# Patient Record
Sex: MALE | Race: WHITE | NOT HISPANIC OR LATINO | Employment: OTHER | URBAN - NONMETROPOLITAN AREA
[De-identification: names, ages, dates, MRNs, and addresses within clinical notes are randomized per-mention and may not be internally consistent; named-entity substitution may affect disease eponyms.]

---

## 2017-04-12 ENCOUNTER — TRANSCRIBE ORDERS (OUTPATIENT)
Dept: ADMINISTRATIVE | Facility: HOSPITAL | Age: 62
End: 2017-04-12

## 2017-04-12 ENCOUNTER — APPOINTMENT (OUTPATIENT)
Dept: LAB | Facility: HOSPITAL | Age: 62
End: 2017-04-12
Attending: INTERNAL MEDICINE

## 2017-04-12 DIAGNOSIS — C61 MALIGNANT NEOPLASM OF PROSTATE (HCC): Primary | ICD-10-CM

## 2017-04-12 LAB — PSA SERPL-MCNC: 1.75 NG/ML (ref 0–4)

## 2017-04-12 PROCEDURE — 36415 COLL VENOUS BLD VENIPUNCTURE: CPT | Performed by: INTERNAL MEDICINE

## 2017-04-12 PROCEDURE — 84153 ASSAY OF PSA TOTAL: CPT | Performed by: INTERNAL MEDICINE

## 2017-04-18 ENCOUNTER — HOSPITAL ENCOUNTER (OUTPATIENT)
Dept: PREADMISSION TESTING | Age: 62
Discharge: HOME | End: 2017-04-18
Payer: COMMERCIAL

## 2017-04-18 ENCOUNTER — HOSPITAL ENCOUNTER (OUTPATIENT)
Dept: GENERAL RADIOLOGY | Age: 62
Discharge: HOME | End: 2017-04-18
Payer: COMMERCIAL

## 2017-04-18 VITALS — WEIGHT: 210 LBS | HEIGHT: 72 IN | BODY MASS INDEX: 28.44 KG/M2

## 2017-04-18 LAB
ALBUMIN SERPL-MCNC: 4.2 G/DL (ref 3.5–5.2)
ALP BLD-CCNC: 18 U/L (ref 40–130)
ALT SERPL-CCNC: 22 U/L (ref 5–41)
ANION GAP SERPL CALCULATED.3IONS-SCNC: 14 MMOL/L (ref 7–19)
APTT: 24.8 SEC (ref 26–36.2)
AST SERPL-CCNC: 22 U/L (ref 5–40)
BASOPHILS ABSOLUTE: 0 K/UL (ref 0–0.2)
BASOPHILS RELATIVE PERCENT: 0.2 % (ref 0–1)
BILIRUB SERPL-MCNC: 0.3 MG/DL (ref 0.2–1.2)
BILIRUBIN URINE: NEGATIVE
BLOOD, URINE: NEGATIVE
BUN BLDV-MCNC: 33 MG/DL (ref 8–23)
CALCIUM SERPL-MCNC: 9.5 MG/DL (ref 8.8–10.2)
CHLORIDE BLD-SCNC: 103 MMOL/L (ref 98–111)
CLARITY: CLEAR
CO2: 22 MMOL/L (ref 22–29)
COLOR: YELLOW
CREAT SERPL-MCNC: 1.4 MG/DL (ref 0.5–1.2)
EOSINOPHILS ABSOLUTE: 0.1 K/UL (ref 0–0.6)
EOSINOPHILS RELATIVE PERCENT: 2 % (ref 0–5)
GFR NON-AFRICAN AMERICAN: 51
GLOBULIN: 2.2 G/DL
GLUCOSE BLD-MCNC: 93 MG/DL (ref 74–109)
GLUCOSE URINE: NEGATIVE MG/DL
HCT VFR BLD CALC: 40.9 % (ref 42–52)
HEMOGLOBIN: 13.3 G/DL (ref 14–18)
INR BLD: 0.97 (ref 0.88–1.18)
KETONES, URINE: NEGATIVE MG/DL
LEUKOCYTE ESTERASE, URINE: NEGATIVE
LYMPHOCYTES ABSOLUTE: 1 K/UL (ref 1.1–4.5)
LYMPHOCYTES RELATIVE PERCENT: 23.4 % (ref 20–40)
MCH RBC QN AUTO: 30.3 PG (ref 27–31)
MCHC RBC AUTO-ENTMCNC: 32.5 G/DL (ref 33–37)
MCV RBC AUTO: 93.2 FL (ref 80–94)
MONOCYTES ABSOLUTE: 0.4 K/UL (ref 0–0.9)
MONOCYTES RELATIVE PERCENT: 9.1 % (ref 0–10)
NEUTROPHILS ABSOLUTE: 2.9 K/UL (ref 1.5–7.5)
NEUTROPHILS RELATIVE PERCENT: 65.1 % (ref 50–65)
NITRITE, URINE: NEGATIVE
PDW BLD-RTO: 12.8 % (ref 11.5–14.5)
PH UA: 7
PLATELET # BLD: 229 K/UL (ref 130–400)
PMV BLD AUTO: 10.5 FL (ref 7.4–10.4)
POTASSIUM SERPL-SCNC: 5 MMOL/L (ref 3.5–5)
PROTEIN UA: NEGATIVE MG/DL
PROTHROMBIN TIME: 12.9 SEC (ref 12–14.6)
RBC # BLD: 4.39 M/UL (ref 4.7–6.1)
SODIUM BLD-SCNC: 139 MMOL/L (ref 136–145)
SPECIFIC GRAVITY UA: 1.02
TOTAL PROTEIN: 6.4 G/DL (ref 6.6–8.7)
UROBILINOGEN, URINE: 0.2 E.U./DL
WBC # BLD: 4.4 K/UL (ref 4.8–10.8)

## 2017-04-18 PROCEDURE — 87070 CULTURE OTHR SPECIMN AEROBIC: CPT

## 2017-04-18 PROCEDURE — 93005 ELECTROCARDIOGRAM TRACING: CPT

## 2017-04-18 PROCEDURE — 81003 URINALYSIS AUTO W/O SCOPE: CPT

## 2017-04-18 PROCEDURE — 85610 PROTHROMBIN TIME: CPT

## 2017-04-18 PROCEDURE — 80053 COMPREHEN METABOLIC PANEL: CPT

## 2017-04-18 PROCEDURE — 85025 COMPLETE CBC W/AUTO DIFF WBC: CPT

## 2017-04-18 PROCEDURE — 71020 XR CHEST STANDARD TWO VW: CPT

## 2017-04-18 PROCEDURE — 85730 THROMBOPLASTIN TIME PARTIAL: CPT

## 2017-04-18 RX ORDER — ZOLPIDEM TARTRATE 10 MG/1
10 TABLET ORAL NIGHTLY PRN
COMMUNITY
End: 2017-10-24 | Stop reason: SDUPTHER

## 2017-04-18 RX ORDER — CELECOXIB 200 MG/1
200 CAPSULE ORAL ONCE
Status: CANCELLED | OUTPATIENT
Start: 2017-05-17

## 2017-04-18 RX ORDER — CELECOXIB 200 MG/1
200 CAPSULE ORAL DAILY
Status: ON HOLD | COMMUNITY
End: 2017-05-19 | Stop reason: HOSPADM

## 2017-04-18 RX ORDER — PREGABALIN 75 MG/1
75 CAPSULE ORAL ONCE
Status: CANCELLED | OUTPATIENT
Start: 2017-05-17

## 2017-04-18 RX ORDER — ATORVASTATIN CALCIUM 20 MG/1
20 TABLET, FILM COATED ORAL EVERY EVENING
COMMUNITY
End: 2017-10-24 | Stop reason: SDUPTHER

## 2017-04-18 RX ORDER — IRBESARTAN 150 MG/1
150 TABLET ORAL NIGHTLY
COMMUNITY
End: 2017-10-24 | Stop reason: SDUPTHER

## 2017-04-18 RX ORDER — PHENOL 1.4 %
AEROSOL, SPRAY (ML) MUCOUS MEMBRANE DAILY
COMMUNITY
End: 2017-10-24

## 2017-04-18 RX ORDER — CHLORAL HYDRATE 500 MG
1000 CAPSULE ORAL DAILY
COMMUNITY
End: 2019-10-07 | Stop reason: ALTCHOICE

## 2017-04-18 RX ORDER — ACETAMINOPHEN 500 MG
1000 TABLET ORAL ONCE
Status: CANCELLED | OUTPATIENT
Start: 2017-05-17

## 2017-04-18 RX ORDER — FENOFIBRATE 160 MG/1
160 TABLET ORAL DAILY
COMMUNITY
End: 2017-10-24 | Stop reason: SDUPTHER

## 2017-04-18 RX ORDER — DEXAMETHASONE SODIUM PHOSPHATE 10 MG/ML
10 INJECTION INTRAMUSCULAR; INTRAVENOUS ONCE
Status: CANCELLED | OUTPATIENT
Start: 2017-05-17

## 2017-04-18 RX ORDER — OXYCODONE HCL 10 MG/1
10 TABLET, FILM COATED, EXTENDED RELEASE ORAL ONCE
Status: CANCELLED | OUTPATIENT
Start: 2017-05-17

## 2017-04-19 LAB
EKG P AXIS: 50 DEGREES
EKG P-R INTERVAL: 190 MS
EKG Q-T INTERVAL: 346 MS
EKG QRS DURATION: 76 MS
EKG QTC CALCULATION (BAZETT): 349 MS
EKG T AXIS: 28 DEGREES
MRSA CULTURE ONLY: NORMAL

## 2017-05-17 ENCOUNTER — HOSPITAL ENCOUNTER (INPATIENT)
Age: 62
LOS: 2 days | Discharge: HOME HEALTH | DRG: 470 | End: 2017-05-19
Attending: ORTHOPAEDIC SURGERY | Admitting: ORTHOPAEDIC SURGERY
Payer: COMMERCIAL

## 2017-05-17 ENCOUNTER — APPOINTMENT (OUTPATIENT)
Dept: GENERAL RADIOLOGY | Age: 62
DRG: 470 | End: 2017-05-17
Attending: ORTHOPAEDIC SURGERY
Payer: COMMERCIAL

## 2017-05-17 ENCOUNTER — ANESTHESIA (OUTPATIENT)
Dept: OPERATING ROOM | Age: 62
DRG: 470 | End: 2017-05-17
Payer: COMMERCIAL

## 2017-05-17 ENCOUNTER — ANESTHESIA EVENT (OUTPATIENT)
Dept: OPERATING ROOM | Age: 62
DRG: 470 | End: 2017-05-17
Payer: COMMERCIAL

## 2017-05-17 VITALS — DIASTOLIC BLOOD PRESSURE: 74 MMHG | SYSTOLIC BLOOD PRESSURE: 122 MMHG | TEMPERATURE: 97 F | OXYGEN SATURATION: 100 %

## 2017-05-17 LAB
ABO/RH: NORMAL
ANTIBODY SCREEN: NORMAL

## 2017-05-17 PROCEDURE — 2720000003 HC MISC SUTURE/STAPLES/RELOADS/ETC: Performed by: ORTHOPAEDIC SURGERY

## 2017-05-17 PROCEDURE — 3700000001 HC ADD 15 MINUTES (ANESTHESIA): Performed by: ORTHOPAEDIC SURGERY

## 2017-05-17 PROCEDURE — 73560 X-RAY EXAM OF KNEE 1 OR 2: CPT

## 2017-05-17 PROCEDURE — C1776 JOINT DEVICE (IMPLANTABLE): HCPCS | Performed by: ORTHOPAEDIC SURGERY

## 2017-05-17 PROCEDURE — 2500000003 HC RX 250 WO HCPCS: Performed by: ORTHOPAEDIC SURGERY

## 2017-05-17 PROCEDURE — 0SRD0J9 REPLACEMENT OF LEFT KNEE JOINT WITH SYNTHETIC SUBSTITUTE, CEMENTED, OPEN APPROACH: ICD-10-PCS | Performed by: ORTHOPAEDIC SURGERY

## 2017-05-17 PROCEDURE — 6360000002 HC RX W HCPCS

## 2017-05-17 PROCEDURE — 7100000000 HC PACU RECOVERY - FIRST 15 MIN: Performed by: ORTHOPAEDIC SURGERY

## 2017-05-17 PROCEDURE — 36415 COLL VENOUS BLD VENIPUNCTURE: CPT

## 2017-05-17 PROCEDURE — 2580000003 HC RX 258: Performed by: ORTHOPAEDIC SURGERY

## 2017-05-17 PROCEDURE — 2500000003 HC RX 250 WO HCPCS

## 2017-05-17 PROCEDURE — C1713 ANCHOR/SCREW BN/BN,TIS/BN: HCPCS | Performed by: ORTHOPAEDIC SURGERY

## 2017-05-17 PROCEDURE — 94664 DEMO&/EVAL PT USE INHALER: CPT

## 2017-05-17 PROCEDURE — C9290 INJ, BUPIVACAINE LIPOSOME: HCPCS | Performed by: ORTHOPAEDIC SURGERY

## 2017-05-17 PROCEDURE — 86901 BLOOD TYPING SEROLOGIC RH(D): CPT

## 2017-05-17 PROCEDURE — 3600000005 HC SURGERY LEVEL 5 BASE: Performed by: ORTHOPAEDIC SURGERY

## 2017-05-17 PROCEDURE — 6360000002 HC RX W HCPCS: Performed by: ORTHOPAEDIC SURGERY

## 2017-05-17 PROCEDURE — G8978 MOBILITY CURRENT STATUS: HCPCS

## 2017-05-17 PROCEDURE — 86850 RBC ANTIBODY SCREEN: CPT

## 2017-05-17 PROCEDURE — 2720000001 HC MISC SURG SUPPLY STERILE $51-500: Performed by: ORTHOPAEDIC SURGERY

## 2017-05-17 PROCEDURE — 64447 NJX AA&/STRD FEMORAL NRV IMG: CPT

## 2017-05-17 PROCEDURE — 7100000001 HC PACU RECOVERY - ADDTL 15 MIN: Performed by: ORTHOPAEDIC SURGERY

## 2017-05-17 PROCEDURE — 6370000000 HC RX 637 (ALT 250 FOR IP): Performed by: ORTHOPAEDIC SURGERY

## 2017-05-17 PROCEDURE — 3700000000 HC ANESTHESIA ATTENDED CARE: Performed by: ORTHOPAEDIC SURGERY

## 2017-05-17 PROCEDURE — 86900 BLOOD TYPING SEROLOGIC ABO: CPT

## 2017-05-17 PROCEDURE — 3600000015 HC SURGERY LEVEL 5 ADDTL 15MIN: Performed by: ORTHOPAEDIC SURGERY

## 2017-05-17 PROCEDURE — 1210000000 HC MED SURG R&B

## 2017-05-17 PROCEDURE — G8979 MOBILITY GOAL STATUS: HCPCS

## 2017-05-17 PROCEDURE — 97162 PT EVAL MOD COMPLEX 30 MIN: CPT

## 2017-05-17 PROCEDURE — 97116 GAIT TRAINING THERAPY: CPT

## 2017-05-17 DEVICE — COMPONENT PAT DIA35MM THK9MM KNEE POLY CEM CONVENTIONAL: Type: IMPLANTABLE DEVICE | Site: KNEE | Status: FUNCTIONAL

## 2017-05-17 DEVICE — COMPONENT ARTC SURF PS 10-12 GH 10 MM LT TIB FIX BEAR: Type: IMPLANTABLE DEVICE | Site: KNEE | Status: FUNCTIONAL

## 2017-05-17 DEVICE — COMPONENT FEM SZ 11 NAR L KNEE CO CHROM CEM POST STBL MID: Type: IMPLANTABLE DEVICE | Site: KNEE | Status: FUNCTIONAL

## 2017-05-17 DEVICE — DISCONTINUED USE 415964 CEMENT PALACOS R + G SING DOSE 40GR: Type: IMPLANTABLE DEVICE | Site: KNEE | Status: FUNCTIONAL

## 2017-05-17 DEVICE — EXTENSION STEM SZ G 5DEG L TIBL KNEE CEM NAT TIB: Type: IMPLANTABLE DEVICE | Site: KNEE | Status: FUNCTIONAL

## 2017-05-17 RX ORDER — ACETAMINOPHEN 500 MG
1000 TABLET ORAL EVERY 8 HOURS
Status: DISCONTINUED | OUTPATIENT
Start: 2017-05-17 | End: 2017-05-19 | Stop reason: HOSPADM

## 2017-05-17 RX ORDER — ATORVASTATIN CALCIUM 20 MG/1
20 TABLET, FILM COATED ORAL EVERY EVENING
Status: DISCONTINUED | OUTPATIENT
Start: 2017-05-17 | End: 2017-05-19 | Stop reason: HOSPADM

## 2017-05-17 RX ORDER — HYDRALAZINE HYDROCHLORIDE 20 MG/ML
5 INJECTION INTRAMUSCULAR; INTRAVENOUS EVERY 10 MIN PRN
Status: DISCONTINUED | OUTPATIENT
Start: 2017-05-17 | End: 2017-05-17 | Stop reason: HOSPADM

## 2017-05-17 RX ORDER — OXYCODONE HYDROCHLORIDE 5 MG/1
10 TABLET ORAL EVERY 4 HOURS PRN
Status: DISCONTINUED | OUTPATIENT
Start: 2017-05-17 | End: 2017-05-19 | Stop reason: HOSPADM

## 2017-05-17 RX ORDER — BUPIVACAINE HYDROCHLORIDE 2.5 MG/ML
INJECTION, SOLUTION INFILTRATION; PERINEURAL PRN
Status: DISCONTINUED | OUTPATIENT
Start: 2017-05-17 | End: 2017-05-17 | Stop reason: HOSPADM

## 2017-05-17 RX ORDER — BUPIVACAINE HYDROCHLORIDE 7.5 MG/ML
INJECTION, SOLUTION INTRASPINAL PRN
Status: DISCONTINUED | OUTPATIENT
Start: 2017-05-17 | End: 2017-05-17 | Stop reason: SDUPTHER

## 2017-05-17 RX ORDER — DIAZEPAM 5 MG/1
5 TABLET ORAL EVERY 6 HOURS PRN
Status: DISCONTINUED | OUTPATIENT
Start: 2017-05-17 | End: 2017-05-19 | Stop reason: HOSPADM

## 2017-05-17 RX ORDER — SODIUM CHLORIDE 0.9 % (FLUSH) 0.9 %
10 SYRINGE (ML) INJECTION EVERY 12 HOURS SCHEDULED
Status: DISCONTINUED | OUTPATIENT
Start: 2017-05-17 | End: 2017-05-19 | Stop reason: HOSPADM

## 2017-05-17 RX ORDER — TRANEXAMIC ACID 100 MG/ML
INJECTION, SOLUTION INTRAVENOUS PRN
Status: DISCONTINUED | OUTPATIENT
Start: 2017-05-17 | End: 2017-05-17 | Stop reason: SDUPTHER

## 2017-05-17 RX ORDER — FENOFIBRATE 54 MG/1
54 TABLET ORAL DAILY
Status: DISCONTINUED | OUTPATIENT
Start: 2017-05-17 | End: 2017-05-19 | Stop reason: HOSPADM

## 2017-05-17 RX ORDER — TRAMADOL HYDROCHLORIDE 50 MG/1
50 TABLET ORAL EVERY 6 HOURS
Status: DISCONTINUED | OUTPATIENT
Start: 2017-05-17 | End: 2017-05-19 | Stop reason: HOSPADM

## 2017-05-17 RX ORDER — ENALAPRILAT 2.5 MG/2ML
1.25 INJECTION INTRAVENOUS
Status: DISCONTINUED | OUTPATIENT
Start: 2017-05-17 | End: 2017-05-17 | Stop reason: HOSPADM

## 2017-05-17 RX ORDER — SODIUM CHLORIDE 0.9 % (FLUSH) 0.9 %
10 SYRINGE (ML) INJECTION PRN
Status: DISCONTINUED | OUTPATIENT
Start: 2017-05-17 | End: 2017-05-19 | Stop reason: HOSPADM

## 2017-05-17 RX ORDER — LABETALOL HYDROCHLORIDE 5 MG/ML
5 INJECTION, SOLUTION INTRAVENOUS EVERY 10 MIN PRN
Status: DISCONTINUED | OUTPATIENT
Start: 2017-05-17 | End: 2017-05-17 | Stop reason: HOSPADM

## 2017-05-17 RX ORDER — METOCLOPRAMIDE HYDROCHLORIDE 5 MG/ML
10 INJECTION INTRAMUSCULAR; INTRAVENOUS
Status: DISCONTINUED | OUTPATIENT
Start: 2017-05-17 | End: 2017-05-17 | Stop reason: HOSPADM

## 2017-05-17 RX ORDER — LIDOCAINE HYDROCHLORIDE 10 MG/ML
1 INJECTION, SOLUTION EPIDURAL; INFILTRATION; INTRACAUDAL; PERINEURAL ONCE
Status: COMPLETED | OUTPATIENT
Start: 2017-05-17 | End: 2017-05-17

## 2017-05-17 RX ORDER — OXYCODONE HYDROCHLORIDE 5 MG/1
20 TABLET ORAL EVERY 4 HOURS PRN
Status: DISCONTINUED | OUTPATIENT
Start: 2017-05-17 | End: 2017-05-19 | Stop reason: HOSPADM

## 2017-05-17 RX ORDER — LIDOCAINE HYDROCHLORIDE 10 MG/ML
INJECTION, SOLUTION EPIDURAL; INFILTRATION; INTRACAUDAL; PERINEURAL PRN
Status: DISCONTINUED | OUTPATIENT
Start: 2017-05-17 | End: 2017-05-17 | Stop reason: SDUPTHER

## 2017-05-17 RX ORDER — EPHEDRINE SULFATE 50 MG/ML
INJECTION, SOLUTION INTRAVENOUS PRN
Status: DISCONTINUED | OUTPATIENT
Start: 2017-05-17 | End: 2017-05-17 | Stop reason: SDUPTHER

## 2017-05-17 RX ORDER — PROPOFOL 10 MG/ML
INJECTION, EMULSION INTRAVENOUS CONTINUOUS PRN
Status: DISCONTINUED | OUTPATIENT
Start: 2017-05-17 | End: 2017-05-17 | Stop reason: SDUPTHER

## 2017-05-17 RX ORDER — 0.9 % SODIUM CHLORIDE 0.9 %
500 INTRAVENOUS SOLUTION INTRAVENOUS PRN
Status: DISCONTINUED | OUTPATIENT
Start: 2017-05-17 | End: 2017-05-19 | Stop reason: HOSPADM

## 2017-05-17 RX ORDER — OXYCODONE HYDROCHLORIDE 5 MG/1
5 TABLET ORAL EVERY 4 HOURS PRN
Status: DISCONTINUED | OUTPATIENT
Start: 2017-05-17 | End: 2017-05-19 | Stop reason: HOSPADM

## 2017-05-17 RX ORDER — ONDANSETRON 2 MG/ML
4 INJECTION INTRAMUSCULAR; INTRAVENOUS EVERY 6 HOURS PRN
Status: DISCONTINUED | OUTPATIENT
Start: 2017-05-17 | End: 2017-05-19 | Stop reason: HOSPADM

## 2017-05-17 RX ORDER — SODIUM CHLORIDE 9 MG/ML
INJECTION, SOLUTION INTRAVENOUS CONTINUOUS
Status: DISCONTINUED | OUTPATIENT
Start: 2017-05-17 | End: 2017-05-19 | Stop reason: HOSPADM

## 2017-05-17 RX ORDER — DEXAMETHASONE SODIUM PHOSPHATE 10 MG/ML
10 INJECTION INTRAMUSCULAR; INTRAVENOUS ONCE
Status: DISCONTINUED | OUTPATIENT
Start: 2017-05-17 | End: 2017-05-17 | Stop reason: HOSPADM

## 2017-05-17 RX ORDER — OXYCODONE HCL 10 MG/1
10 TABLET, FILM COATED, EXTENDED RELEASE ORAL ONCE
Status: COMPLETED | OUTPATIENT
Start: 2017-05-17 | End: 2017-05-17

## 2017-05-17 RX ORDER — CLINDAMYCIN PHOSPHATE 900 MG/50ML
900 INJECTION INTRAVENOUS EVERY 8 HOURS
Status: COMPLETED | OUTPATIENT
Start: 2017-05-17 | End: 2017-05-19

## 2017-05-17 RX ORDER — OXYCODONE HCL 10 MG/1
10 TABLET, FILM COATED, EXTENDED RELEASE ORAL EVERY 12 HOURS SCHEDULED
Status: DISCONTINUED | OUTPATIENT
Start: 2017-05-17 | End: 2017-05-19 | Stop reason: HOSPADM

## 2017-05-17 RX ORDER — MEPERIDINE HYDROCHLORIDE 50 MG/ML
12.5 INJECTION INTRAMUSCULAR; INTRAVENOUS; SUBCUTANEOUS EVERY 5 MIN PRN
Status: DISCONTINUED | OUTPATIENT
Start: 2017-05-17 | End: 2017-05-17 | Stop reason: HOSPADM

## 2017-05-17 RX ORDER — FENTANYL CITRATE 50 UG/ML
INJECTION, SOLUTION INTRAMUSCULAR; INTRAVENOUS PRN
Status: DISCONTINUED | OUTPATIENT
Start: 2017-05-17 | End: 2017-05-17 | Stop reason: SDUPTHER

## 2017-05-17 RX ORDER — MIDAZOLAM HYDROCHLORIDE 1 MG/ML
INJECTION INTRAMUSCULAR; INTRAVENOUS
Status: COMPLETED
Start: 2017-05-17 | End: 2017-05-17

## 2017-05-17 RX ORDER — MORPHINE SULFATE 4 MG/ML
2 INJECTION, SOLUTION INTRAMUSCULAR; INTRAVENOUS EVERY 5 MIN PRN
Status: DISCONTINUED | OUTPATIENT
Start: 2017-05-17 | End: 2017-05-17 | Stop reason: HOSPADM

## 2017-05-17 RX ORDER — MORPHINE SULFATE 4 MG/ML
4 INJECTION, SOLUTION INTRAMUSCULAR; INTRAVENOUS EVERY 5 MIN PRN
Status: DISCONTINUED | OUTPATIENT
Start: 2017-05-17 | End: 2017-05-17 | Stop reason: HOSPADM

## 2017-05-17 RX ORDER — SODIUM CHLORIDE, SODIUM LACTATE, POTASSIUM CHLORIDE, CALCIUM CHLORIDE 600; 310; 30; 20 MG/100ML; MG/100ML; MG/100ML; MG/100ML
INJECTION, SOLUTION INTRAVENOUS CONTINUOUS
Status: DISCONTINUED | OUTPATIENT
Start: 2017-05-17 | End: 2017-05-17

## 2017-05-17 RX ORDER — PROMETHAZINE HYDROCHLORIDE 25 MG/ML
6.25 INJECTION, SOLUTION INTRAMUSCULAR; INTRAVENOUS
Status: DISCONTINUED | OUTPATIENT
Start: 2017-05-17 | End: 2017-05-17 | Stop reason: HOSPADM

## 2017-05-17 RX ORDER — PREGABALIN 75 MG/1
75 CAPSULE ORAL ONCE
Status: COMPLETED | OUTPATIENT
Start: 2017-05-17 | End: 2017-05-17

## 2017-05-17 RX ORDER — ZOLPIDEM TARTRATE 5 MG/1
10 TABLET ORAL NIGHTLY PRN
Status: DISCONTINUED | OUTPATIENT
Start: 2017-05-17 | End: 2017-05-19 | Stop reason: HOSPADM

## 2017-05-17 RX ORDER — ACETAMINOPHEN 500 MG
1000 TABLET ORAL ONCE
Status: COMPLETED | OUTPATIENT
Start: 2017-05-17 | End: 2017-05-17

## 2017-05-17 RX ORDER — TAMSULOSIN HYDROCHLORIDE 0.4 MG/1
0.4 CAPSULE ORAL DAILY
Status: DISCONTINUED | OUTPATIENT
Start: 2017-05-17 | End: 2017-05-19 | Stop reason: HOSPADM

## 2017-05-17 RX ORDER — DIPHENHYDRAMINE HYDROCHLORIDE 50 MG/ML
12.5 INJECTION INTRAMUSCULAR; INTRAVENOUS
Status: DISCONTINUED | OUTPATIENT
Start: 2017-05-17 | End: 2017-05-17 | Stop reason: HOSPADM

## 2017-05-17 RX ORDER — DOCUSATE SODIUM 100 MG/1
100 CAPSULE, LIQUID FILLED ORAL 2 TIMES DAILY
Status: DISCONTINUED | OUTPATIENT
Start: 2017-05-17 | End: 2017-05-19 | Stop reason: HOSPADM

## 2017-05-17 RX ADMIN — LIDOCAINE HYDROCHLORIDE 5 ML: 10 INJECTION, SOLUTION EPIDURAL; INFILTRATION; INTRACAUDAL; PERINEURAL at 08:30

## 2017-05-17 RX ADMIN — FENTANYL CITRATE 50 MCG: 50 INJECTION INTRAMUSCULAR; INTRAVENOUS at 08:40

## 2017-05-17 RX ADMIN — CLINDAMYCIN PHOSPHATE 900 MG: 18 INJECTION, SOLUTION INTRAVENOUS at 20:30

## 2017-05-17 RX ADMIN — TAMSULOSIN HYDROCHLORIDE 0.4 MG: 0.4 CAPSULE ORAL at 12:07

## 2017-05-17 RX ADMIN — TRANEXAMIC ACID 1000 MG: 100 INJECTION, SOLUTION INTRAVENOUS at 08:34

## 2017-05-17 RX ADMIN — RIVAROXABAN 10 MG: 10 TABLET, FILM COATED ORAL at 17:15

## 2017-05-17 RX ADMIN — OXYCODONE HYDROCHLORIDE 10 MG: 10 TABLET, FILM COATED, EXTENDED RELEASE ORAL at 20:30

## 2017-05-17 RX ADMIN — OXYCODONE HYDROCHLORIDE 10 MG: 5 TABLET ORAL at 22:08

## 2017-05-17 RX ADMIN — ACETAMINOPHEN 1000 MG: 500 TABLET, COATED ORAL at 15:26

## 2017-05-17 RX ADMIN — FENTANYL CITRATE 50 MCG: 50 INJECTION INTRAMUSCULAR; INTRAVENOUS at 09:40

## 2017-05-17 RX ADMIN — PREGABALIN 75 MG: 75 CAPSULE ORAL at 06:52

## 2017-05-17 RX ADMIN — FENTANYL CITRATE 50 MCG: 50 INJECTION INTRAMUSCULAR; INTRAVENOUS at 09:15

## 2017-05-17 RX ADMIN — OXYCODONE HYDROCHLORIDE 10 MG: 5 TABLET ORAL at 12:06

## 2017-05-17 RX ADMIN — PROPOFOL 100 MCG/KG/MIN: 10 INJECTION, EMULSION INTRAVENOUS at 08:30

## 2017-05-17 RX ADMIN — Medication 2 G: at 15:26

## 2017-05-17 RX ADMIN — LIDOCAINE HYDROCHLORIDE 1 ML: 10 INJECTION, SOLUTION EPIDURAL; INFILTRATION; INTRACAUDAL; PERINEURAL at 07:03

## 2017-05-17 RX ADMIN — SODIUM CHLORIDE, SODIUM LACTATE, POTASSIUM CHLORIDE, AND CALCIUM CHLORIDE: 600; 310; 30; 20 INJECTION, SOLUTION INTRAVENOUS at 07:03

## 2017-05-17 RX ADMIN — TRAMADOL HYDROCHLORIDE 50 MG: 50 TABLET, FILM COATED ORAL at 12:07

## 2017-05-17 RX ADMIN — TRANEXAMIC ACID 1000 MG: 100 INJECTION, SOLUTION INTRAVENOUS at 10:12

## 2017-05-17 RX ADMIN — BUPIVACAINE HYDROCHLORIDE IN DEXTROSE 2 ML: 7.5 INJECTION, SOLUTION SUBARACHNOID at 08:27

## 2017-05-17 RX ADMIN — ATORVASTATIN CALCIUM 20 MG: 20 TABLET, FILM COATED ORAL at 17:14

## 2017-05-17 RX ADMIN — DOCUSATE SODIUM 100 MG: 100 CAPSULE, LIQUID FILLED ORAL at 12:07

## 2017-05-17 RX ADMIN — OXYCODONE HYDROCHLORIDE 10 MG: 5 TABLET ORAL at 17:15

## 2017-05-17 RX ADMIN — TRAMADOL HYDROCHLORIDE 50 MG: 50 TABLET, FILM COATED ORAL at 17:15

## 2017-05-17 RX ADMIN — CLINDAMYCIN PHOSPHATE 900 MG: 18 INJECTION, SOLUTION INTRAVENOUS at 13:45

## 2017-05-17 RX ADMIN — Medication 2 G: at 08:33

## 2017-05-17 RX ADMIN — FENTANYL CITRATE 50 MCG: 50 INJECTION INTRAMUSCULAR; INTRAVENOUS at 08:30

## 2017-05-17 RX ADMIN — SODIUM CHLORIDE: 9 INJECTION, SOLUTION INTRAVENOUS at 13:45

## 2017-05-17 RX ADMIN — ACETAMINOPHEN 1000 MG: 500 TABLET ORAL at 06:52

## 2017-05-17 RX ADMIN — EPHEDRINE SULFATE 10 MG: 50 INJECTION, SOLUTION INTRAMUSCULAR; INTRAVENOUS; SUBCUTANEOUS at 08:53

## 2017-05-17 RX ADMIN — MIDAZOLAM 2 MG: 1 INJECTION INTRAMUSCULAR; INTRAVENOUS at 07:10

## 2017-05-17 RX ADMIN — FENTANYL CITRATE 50 MCG: 50 INJECTION INTRAMUSCULAR; INTRAVENOUS at 10:13

## 2017-05-17 RX ADMIN — OXYCODONE HYDROCHLORIDE 10 MG: 10 TABLET, FILM COATED, EXTENDED RELEASE ORAL at 06:52

## 2017-05-17 RX ADMIN — DOCUSATE SODIUM 100 MG: 100 CAPSULE, LIQUID FILLED ORAL at 20:31

## 2017-05-17 RX ADMIN — LIDOCAINE HYDROCHLORIDE 3 ML: 10 INJECTION, SOLUTION EPIDURAL; INFILTRATION; INTRACAUDAL; PERINEURAL at 08:27

## 2017-05-17 ASSESSMENT — PAIN - FUNCTIONAL ASSESSMENT: PAIN_FUNCTIONAL_ASSESSMENT: 0-10

## 2017-05-17 ASSESSMENT — PAIN SCALES - GENERAL
PAINLEVEL_OUTOF10: 4
PAINLEVEL_OUTOF10: 3
PAINLEVEL_OUTOF10: 2
PAINLEVEL_OUTOF10: 5
PAINLEVEL_OUTOF10: 4
PAINLEVEL_OUTOF10: 0
PAINLEVEL_OUTOF10: 7
PAINLEVEL_OUTOF10: 3
PAINLEVEL_OUTOF10: 5
PAINLEVEL_OUTOF10: 2
PAINLEVEL_OUTOF10: 3

## 2017-05-17 ASSESSMENT — PAIN DESCRIPTION - LOCATION: LOCATION: KNEE

## 2017-05-17 ASSESSMENT — PAIN DESCRIPTION - ORIENTATION: ORIENTATION: LEFT

## 2017-05-17 ASSESSMENT — PAIN DESCRIPTION - PAIN TYPE: TYPE: SURGICAL PAIN

## 2017-05-18 PROBLEM — M17.12 PRIMARY OSTEOARTHRITIS OF LEFT KNEE: Chronic | Status: ACTIVE | Noted: 2017-05-18

## 2017-05-18 LAB
ANION GAP SERPL CALCULATED.3IONS-SCNC: 14 MMOL/L (ref 7–19)
BUN BLDV-MCNC: 32 MG/DL (ref 8–23)
CALCIUM SERPL-MCNC: 8.4 MG/DL (ref 8.8–10.2)
CHLORIDE BLD-SCNC: 102 MMOL/L (ref 98–111)
CO2: 21 MMOL/L (ref 22–29)
CREAT SERPL-MCNC: 1.3 MG/DL (ref 0.5–1.2)
GFR NON-AFRICAN AMERICAN: 56
GLUCOSE BLD-MCNC: 122 MG/DL (ref 74–109)
HCT VFR BLD CALC: 31.5 % (ref 42–52)
HEMOGLOBIN: 10.5 G/DL (ref 14–18)
POTASSIUM SERPL-SCNC: 4.8 MMOL/L (ref 3.5–5)
SODIUM BLD-SCNC: 137 MMOL/L (ref 136–145)

## 2017-05-18 PROCEDURE — 85014 HEMATOCRIT: CPT

## 2017-05-18 PROCEDURE — 1210000000 HC MED SURG R&B

## 2017-05-18 PROCEDURE — 2500000003 HC RX 250 WO HCPCS: Performed by: ORTHOPAEDIC SURGERY

## 2017-05-18 PROCEDURE — 6370000000 HC RX 637 (ALT 250 FOR IP): Performed by: ORTHOPAEDIC SURGERY

## 2017-05-18 PROCEDURE — 85018 HEMOGLOBIN: CPT

## 2017-05-18 PROCEDURE — 80048 BASIC METABOLIC PNL TOTAL CA: CPT

## 2017-05-18 PROCEDURE — 6360000002 HC RX W HCPCS: Performed by: ORTHOPAEDIC SURGERY

## 2017-05-18 PROCEDURE — 36415 COLL VENOUS BLD VENIPUNCTURE: CPT

## 2017-05-18 PROCEDURE — 2580000003 HC RX 258: Performed by: ORTHOPAEDIC SURGERY

## 2017-05-18 RX ADMIN — OXYCODONE HYDROCHLORIDE 10 MG: 5 TABLET ORAL at 07:09

## 2017-05-18 RX ADMIN — TAMSULOSIN HYDROCHLORIDE 0.4 MG: 0.4 CAPSULE ORAL at 08:23

## 2017-05-18 RX ADMIN — RIVAROXABAN 10 MG: 10 TABLET, FILM COATED ORAL at 18:02

## 2017-05-18 RX ADMIN — HYDROMORPHONE HYDROCHLORIDE 1 MG: 1 INJECTION, SOLUTION INTRAMUSCULAR; INTRAVENOUS; SUBCUTANEOUS at 13:37

## 2017-05-18 RX ADMIN — TRAMADOL HYDROCHLORIDE 50 MG: 50 TABLET, FILM COATED ORAL at 05:07

## 2017-05-18 RX ADMIN — OXYCODONE HYDROCHLORIDE 10 MG: 5 TABLET ORAL at 02:48

## 2017-05-18 RX ADMIN — TRAMADOL HYDROCHLORIDE 50 MG: 50 TABLET, FILM COATED ORAL at 18:02

## 2017-05-18 RX ADMIN — ATORVASTATIN CALCIUM 20 MG: 20 TABLET, FILM COATED ORAL at 18:02

## 2017-05-18 RX ADMIN — DIAZEPAM 5 MG: 5 TABLET ORAL at 20:34

## 2017-05-18 RX ADMIN — DOCUSATE SODIUM 100 MG: 100 CAPSULE, LIQUID FILLED ORAL at 08:23

## 2017-05-18 RX ADMIN — OXYCODONE HYDROCHLORIDE 10 MG: 10 TABLET, FILM COATED, EXTENDED RELEASE ORAL at 08:23

## 2017-05-18 RX ADMIN — OXYCODONE HYDROCHLORIDE 20 MG: 5 TABLET ORAL at 20:34

## 2017-05-18 RX ADMIN — HYDROMORPHONE HYDROCHLORIDE 1 MG: 1 INJECTION, SOLUTION INTRAMUSCULAR; INTRAVENOUS; SUBCUTANEOUS at 08:39

## 2017-05-18 RX ADMIN — CLINDAMYCIN PHOSPHATE 900 MG: 18 INJECTION, SOLUTION INTRAVENOUS at 05:07

## 2017-05-18 RX ADMIN — ACETAMINOPHEN 1000 MG: 500 TABLET, COATED ORAL at 00:18

## 2017-05-18 RX ADMIN — DOCUSATE SODIUM 100 MG: 100 CAPSULE, LIQUID FILLED ORAL at 20:34

## 2017-05-18 RX ADMIN — CLINDAMYCIN PHOSPHATE 900 MG: 18 INJECTION, SOLUTION INTRAVENOUS at 20:36

## 2017-05-18 RX ADMIN — ACETAMINOPHEN 1000 MG: 500 TABLET, COATED ORAL at 07:09

## 2017-05-18 RX ADMIN — Medication 10 ML: at 08:23

## 2017-05-18 RX ADMIN — FENOFIBRATE 54 MG: 54 TABLET ORAL at 08:23

## 2017-05-18 RX ADMIN — OXYCODONE HYDROCHLORIDE 10 MG: 5 TABLET ORAL at 11:21

## 2017-05-18 RX ADMIN — Medication 2 G: at 00:18

## 2017-05-18 RX ADMIN — Medication 10 ML: at 20:37

## 2017-05-18 RX ADMIN — CLINDAMYCIN PHOSPHATE 900 MG: 18 INJECTION, SOLUTION INTRAVENOUS at 13:32

## 2017-05-18 RX ADMIN — TRAMADOL HYDROCHLORIDE 50 MG: 50 TABLET, FILM COATED ORAL at 12:06

## 2017-05-18 RX ADMIN — ACETAMINOPHEN 1000 MG: 500 TABLET, COATED ORAL at 15:28

## 2017-05-18 RX ADMIN — OXYCODONE HYDROCHLORIDE 10 MG: 10 TABLET, FILM COATED, EXTENDED RELEASE ORAL at 20:34

## 2017-05-18 ASSESSMENT — PAIN SCALES - GENERAL
PAINLEVEL_OUTOF10: 8
PAINLEVEL_OUTOF10: 8
PAINLEVEL_OUTOF10: 3
PAINLEVEL_OUTOF10: 8
PAINLEVEL_OUTOF10: 8
PAINLEVEL_OUTOF10: 2
PAINLEVEL_OUTOF10: 8
PAINLEVEL_OUTOF10: 6
PAINLEVEL_OUTOF10: 8
PAINLEVEL_OUTOF10: 0
PAINLEVEL_OUTOF10: 7
PAINLEVEL_OUTOF10: 8
PAINLEVEL_OUTOF10: 8
PAINLEVEL_OUTOF10: 7
PAINLEVEL_OUTOF10: 8

## 2017-05-19 VITALS
HEIGHT: 72 IN | BODY MASS INDEX: 28.44 KG/M2 | TEMPERATURE: 98.7 F | WEIGHT: 210 LBS | HEART RATE: 95 BPM | DIASTOLIC BLOOD PRESSURE: 79 MMHG | OXYGEN SATURATION: 92 % | RESPIRATION RATE: 17 BRPM | SYSTOLIC BLOOD PRESSURE: 120 MMHG

## 2017-05-19 LAB
ANION GAP SERPL CALCULATED.3IONS-SCNC: 14 MMOL/L (ref 7–19)
BUN BLDV-MCNC: 27 MG/DL (ref 8–23)
CALCIUM SERPL-MCNC: 8.4 MG/DL (ref 8.8–10.2)
CHLORIDE BLD-SCNC: 101 MMOL/L (ref 98–111)
CO2: 21 MMOL/L (ref 22–29)
CREAT SERPL-MCNC: 1.4 MG/DL (ref 0.5–1.2)
GFR NON-AFRICAN AMERICAN: 51
GLUCOSE BLD-MCNC: 124 MG/DL (ref 74–109)
HCT VFR BLD CALC: 30.5 % (ref 42–52)
HEMOGLOBIN: 10.1 G/DL (ref 14–18)
MCH RBC QN AUTO: 31.3 PG (ref 27–31)
MCHC RBC AUTO-ENTMCNC: 33.1 G/DL (ref 33–37)
MCV RBC AUTO: 94.4 FL (ref 80–94)
PDW BLD-RTO: 12.6 % (ref 11.5–14.5)
PLATELET # BLD: 164 K/UL (ref 130–400)
PMV BLD AUTO: 10.2 FL (ref 7.4–10.4)
POTASSIUM SERPL-SCNC: 4.1 MMOL/L (ref 3.5–5)
RBC # BLD: 3.23 M/UL (ref 4.7–6.1)
SODIUM BLD-SCNC: 136 MMOL/L (ref 136–145)
WBC # BLD: 7.7 K/UL (ref 4.8–10.8)

## 2017-05-19 PROCEDURE — 80048 BASIC METABOLIC PNL TOTAL CA: CPT

## 2017-05-19 PROCEDURE — 6360000002 HC RX W HCPCS: Performed by: ORTHOPAEDIC SURGERY

## 2017-05-19 PROCEDURE — 97530 THERAPEUTIC ACTIVITIES: CPT

## 2017-05-19 PROCEDURE — 2580000003 HC RX 258: Performed by: ORTHOPAEDIC SURGERY

## 2017-05-19 PROCEDURE — 85027 COMPLETE CBC AUTOMATED: CPT

## 2017-05-19 PROCEDURE — 6370000000 HC RX 637 (ALT 250 FOR IP): Performed by: ORTHOPAEDIC SURGERY

## 2017-05-19 PROCEDURE — 97116 GAIT TRAINING THERAPY: CPT

## 2017-05-19 PROCEDURE — 36415 COLL VENOUS BLD VENIPUNCTURE: CPT

## 2017-05-19 PROCEDURE — 2500000003 HC RX 250 WO HCPCS: Performed by: ORTHOPAEDIC SURGERY

## 2017-05-19 RX ORDER — OXYCODONE HYDROCHLORIDE 10 MG/1
10 TABLET ORAL EVERY 4 HOURS PRN
Qty: 80 TABLET | Refills: 0
Start: 2017-05-19 | End: 2017-05-26

## 2017-05-19 RX ADMIN — OXYCODONE HYDROCHLORIDE 20 MG: 5 TABLET ORAL at 09:39

## 2017-05-19 RX ADMIN — HYDROMORPHONE HYDROCHLORIDE 1 MG: 1 INJECTION, SOLUTION INTRAMUSCULAR; INTRAVENOUS; SUBCUTANEOUS at 00:00

## 2017-05-19 RX ADMIN — TAMSULOSIN HYDROCHLORIDE 0.4 MG: 0.4 CAPSULE ORAL at 09:39

## 2017-05-19 RX ADMIN — OXYCODONE HYDROCHLORIDE 20 MG: 5 TABLET ORAL at 13:33

## 2017-05-19 RX ADMIN — FENOFIBRATE 54 MG: 54 TABLET ORAL at 09:38

## 2017-05-19 RX ADMIN — ACETAMINOPHEN 1000 MG: 500 TABLET, COATED ORAL at 01:23

## 2017-05-19 RX ADMIN — HYDROMORPHONE HYDROCHLORIDE 1 MG: 1 INJECTION, SOLUTION INTRAMUSCULAR; INTRAVENOUS; SUBCUTANEOUS at 05:56

## 2017-05-19 RX ADMIN — DOCUSATE SODIUM 100 MG: 100 CAPSULE, LIQUID FILLED ORAL at 09:39

## 2017-05-19 RX ADMIN — TRAMADOL HYDROCHLORIDE 50 MG: 50 TABLET, FILM COATED ORAL at 12:03

## 2017-05-19 RX ADMIN — DIAZEPAM 5 MG: 5 TABLET ORAL at 05:53

## 2017-05-19 RX ADMIN — CLINDAMYCIN PHOSPHATE 900 MG: 18 INJECTION, SOLUTION INTRAVENOUS at 05:46

## 2017-05-19 RX ADMIN — SODIUM CHLORIDE: 9 INJECTION, SOLUTION INTRAVENOUS at 05:49

## 2017-05-19 RX ADMIN — OXYCODONE HYDROCHLORIDE 20 MG: 5 TABLET ORAL at 03:11

## 2017-05-19 RX ADMIN — OXYCODONE HYDROCHLORIDE 10 MG: 10 TABLET, FILM COATED, EXTENDED RELEASE ORAL at 09:38

## 2017-05-19 ASSESSMENT — PAIN SCALES - GENERAL
PAINLEVEL_OUTOF10: 0
PAINLEVEL_OUTOF10: 7
PAINLEVEL_OUTOF10: 0
PAINLEVEL_OUTOF10: 0
PAINLEVEL_OUTOF10: 10
PAINLEVEL_OUTOF10: 8
PAINLEVEL_OUTOF10: 8
PAINLEVEL_OUTOF10: 0
PAINLEVEL_OUTOF10: 8
PAINLEVEL_OUTOF10: 8

## 2017-08-04 LAB
ALBUMIN SERPL-MCNC: 4.2 G/DL (ref 3.5–5.2)
ALP BLD-CCNC: 23 U/L (ref 40–130)
ALT SERPL-CCNC: 17 U/L (ref 5–41)
ANION GAP SERPL CALCULATED.3IONS-SCNC: 21 MMOL/L (ref 7–19)
AST SERPL-CCNC: 20 U/L (ref 5–40)
BASOPHILS ABSOLUTE: 0 K/UL (ref 0–0.2)
BASOPHILS RELATIVE PERCENT: 0.6 % (ref 0–1)
BILIRUB SERPL-MCNC: 0.3 MG/DL (ref 0.2–1.2)
BUN BLDV-MCNC: 25 MG/DL (ref 8–23)
CALCIUM SERPL-MCNC: 9.6 MG/DL (ref 8.8–10.2)
CHLORIDE BLD-SCNC: 105 MMOL/L (ref 98–111)
CHOLESTEROL, TOTAL: 166 MG/DL (ref 160–199)
CO2: 18 MMOL/L (ref 22–29)
CREAT SERPL-MCNC: 1.3 MG/DL (ref 0.5–1.2)
EOSINOPHILS ABSOLUTE: 0.1 K/UL (ref 0–0.6)
EOSINOPHILS RELATIVE PERCENT: 2 % (ref 0–5)
GFR NON-AFRICAN AMERICAN: 56
GLUCOSE BLD-MCNC: 91 MG/DL (ref 74–109)
HCT VFR BLD CALC: 38.5 % (ref 42–52)
HDLC SERPL-MCNC: 29 MG/DL (ref 55–121)
HEMOGLOBIN: 12.5 G/DL (ref 14–18)
LDL CHOLESTEROL CALCULATED: 102 MG/DL
LYMPHOCYTES ABSOLUTE: 1.4 K/UL (ref 1.1–4.5)
LYMPHOCYTES RELATIVE PERCENT: 27.1 % (ref 20–40)
MCH RBC QN AUTO: 29.6 PG (ref 27–31)
MCHC RBC AUTO-ENTMCNC: 32.5 G/DL (ref 33–37)
MCV RBC AUTO: 91 FL (ref 80–94)
MONOCYTES ABSOLUTE: 0.4 K/UL (ref 0–0.9)
MONOCYTES RELATIVE PERCENT: 7.8 % (ref 0–10)
NEUTROPHILS ABSOLUTE: 3.1 K/UL (ref 1.5–7.5)
NEUTROPHILS RELATIVE PERCENT: 62.3 % (ref 50–65)
PDW BLD-RTO: 13.1 % (ref 11.5–14.5)
PLATELET # BLD: 247 K/UL (ref 130–400)
PMV BLD AUTO: 10.3 FL (ref 9.4–12.4)
POTASSIUM SERPL-SCNC: 4.4 MMOL/L (ref 3.5–5)
RBC # BLD: 4.23 M/UL (ref 4.7–6.1)
SODIUM BLD-SCNC: 144 MMOL/L (ref 136–145)
TOTAL PROTEIN: 7 G/DL (ref 6.6–8.7)
TRIGL SERPL-MCNC: 176 MG/DL (ref 150–199)
WBC # BLD: 5 K/UL (ref 4.8–10.8)

## 2017-08-10 PROBLEM — C61 PROSTATE CARCINOMA (HCC): Chronic | Status: ACTIVE | Noted: 2017-08-10

## 2017-08-10 PROBLEM — E78.5 HYPERLIPIDEMIA: Chronic | Status: ACTIVE | Noted: 2017-08-10

## 2017-08-10 PROBLEM — E21.0 HYPERPARATHYROIDISM, PRIMARY (HCC): Chronic | Status: ACTIVE | Noted: 2017-08-10

## 2017-08-10 PROBLEM — R73.9 HYPERGLYCEMIA: Chronic | Status: ACTIVE | Noted: 2017-08-10

## 2017-08-10 PROBLEM — N18.30 CKD (CHRONIC KIDNEY DISEASE) STAGE 3, GFR 30-59 ML/MIN (HCC): Chronic | Status: ACTIVE | Noted: 2017-08-10

## 2017-08-11 ENCOUNTER — TELEPHONE (OUTPATIENT)
Dept: INTERNAL MEDICINE | Age: 62
End: 2017-08-11

## 2017-08-11 RX ORDER — OXYCODONE HYDROCHLORIDE 10 MG/1
TABLET ORAL
Refills: 0 | COMMUNITY
Start: 2017-06-02 | End: 2017-10-24

## 2017-09-05 RX ORDER — IRBESARTAN 150 MG/1
150 TABLET ORAL
COMMUNITY
Start: 2016-04-15 | End: 2017-10-06 | Stop reason: SDUPTHER

## 2017-09-05 RX ORDER — DOXYCYCLINE 100 MG/1
CAPSULE ORAL
Refills: 0 | COMMUNITY
Start: 2017-07-06 | End: 2017-10-24

## 2017-09-05 RX ORDER — CELECOXIB 200 MG/1
200 CAPSULE ORAL
COMMUNITY
Start: 2016-04-23 | End: 2017-10-24

## 2017-09-05 RX ORDER — FENOFIBRATE 160 MG/1
160 TABLET ORAL
COMMUNITY
Start: 2016-04-15 | End: 2017-10-06 | Stop reason: SDUPTHER

## 2017-09-05 RX ORDER — MULTIVITAMIN
1 CAPSULE ORAL DAILY
COMMUNITY

## 2017-09-05 RX ORDER — ATORVASTATIN CALCIUM 20 MG/1
20 TABLET, FILM COATED ORAL
COMMUNITY
Start: 2016-04-01 | End: 2017-10-24

## 2017-09-05 RX ORDER — OXYCODONE HYDROCHLORIDE 5 MG/1
TABLET ORAL
Refills: 0 | COMMUNITY
Start: 2017-06-11 | End: 2017-10-24

## 2017-09-05 RX ORDER — ZOLPIDEM TARTRATE 10 MG/1
5 TABLET ORAL
COMMUNITY
Start: 2016-06-04 | End: 2017-10-24

## 2017-09-05 RX ORDER — ONDANSETRON 4 MG/1
4 TABLET, FILM COATED ORAL
COMMUNITY
Start: 2016-10-02 | End: 2018-10-23

## 2017-09-25 ENCOUNTER — TELEPHONE (OUTPATIENT)
Dept: INTERNAL MEDICINE | Age: 62
End: 2017-09-25

## 2017-09-25 NOTE — TELEPHONE ENCOUNTER
Returned call about needing to talk to the nurse, he didn't answer so I left him a message saying I just needed a little more information from him and to call us back

## 2017-09-26 ENCOUNTER — TELEPHONE (OUTPATIENT)
Dept: INTERNAL MEDICINE | Age: 62
End: 2017-09-26

## 2017-09-26 DIAGNOSIS — C61 PROSTATE CANCER (HCC): Primary | ICD-10-CM

## 2017-10-04 ENCOUNTER — APPOINTMENT (OUTPATIENT)
Dept: LAB | Facility: HOSPITAL | Age: 62
End: 2017-10-04
Attending: INTERNAL MEDICINE

## 2017-10-04 ENCOUNTER — TRANSCRIBE ORDERS (OUTPATIENT)
Dept: ADMINISTRATIVE | Facility: HOSPITAL | Age: 62
End: 2017-10-04

## 2017-10-04 DIAGNOSIS — C61 PROSTATE CANCER (HCC): Primary | ICD-10-CM

## 2017-10-04 LAB — PSA SERPL-MCNC: 3.45 NG/ML (ref 0–4)

## 2017-10-04 PROCEDURE — 36415 COLL VENOUS BLD VENIPUNCTURE: CPT

## 2017-10-04 PROCEDURE — 84153 ASSAY OF PSA TOTAL: CPT | Performed by: INTERNAL MEDICINE

## 2017-10-09 RX ORDER — FENOFIBRATE 160 MG/1
TABLET ORAL
Qty: 90 TABLET | Refills: 1 | Status: SHIPPED | OUTPATIENT
Start: 2017-10-09 | End: 2017-10-24

## 2017-10-09 RX ORDER — IRBESARTAN 150 MG/1
TABLET ORAL
Qty: 90 TABLET | Refills: 1 | Status: SHIPPED | OUTPATIENT
Start: 2017-10-09 | End: 2017-10-24

## 2017-10-23 PROBLEM — I10 HYPERTENSION: Status: ACTIVE | Noted: 2017-10-23

## 2017-10-24 ENCOUNTER — OFFICE VISIT (OUTPATIENT)
Dept: INTERNAL MEDICINE | Age: 62
End: 2017-10-24
Payer: COMMERCIAL

## 2017-10-24 VITALS
HEART RATE: 69 BPM | DIASTOLIC BLOOD PRESSURE: 64 MMHG | WEIGHT: 206 LBS | SYSTOLIC BLOOD PRESSURE: 102 MMHG | OXYGEN SATURATION: 95 % | HEIGHT: 72 IN | BODY MASS INDEX: 27.9 KG/M2

## 2017-10-24 DIAGNOSIS — I10 ESSENTIAL HYPERTENSION: ICD-10-CM

## 2017-10-24 DIAGNOSIS — C61 PROSTATE CARCINOMA (HCC): Primary | Chronic | ICD-10-CM

## 2017-10-24 DIAGNOSIS — N18.30 CKD (CHRONIC KIDNEY DISEASE) STAGE 3, GFR 30-59 ML/MIN (HCC): Chronic | ICD-10-CM

## 2017-10-24 DIAGNOSIS — E78.01 FAMILIAL HYPERCHOLESTEROLEMIA: Chronic | ICD-10-CM

## 2017-10-24 PROBLEM — M17.12 PRIMARY OSTEOARTHRITIS OF LEFT KNEE: Chronic | Status: RESOLVED | Noted: 2017-05-18 | Resolved: 2017-10-24

## 2017-10-24 PROCEDURE — 99396 PREV VISIT EST AGE 40-64: CPT | Performed by: INTERNAL MEDICINE

## 2017-10-24 RX ORDER — IRBESARTAN 150 MG/1
150 TABLET ORAL NIGHTLY
Qty: 90 TABLET | Refills: 2 | Status: SHIPPED | OUTPATIENT
Start: 2017-10-24 | End: 2018-09-11 | Stop reason: SDUPTHER

## 2017-10-24 RX ORDER — FENOFIBRATE 160 MG/1
160 TABLET ORAL DAILY
Qty: 90 TABLET | Refills: 2 | Status: SHIPPED | OUTPATIENT
Start: 2017-10-24 | End: 2018-09-11 | Stop reason: SDUPTHER

## 2017-10-24 RX ORDER — ATORVASTATIN CALCIUM 20 MG/1
20 TABLET, FILM COATED ORAL EVERY EVENING
Qty: 90 TABLET | Refills: 2 | Status: SHIPPED | OUTPATIENT
Start: 2017-10-24 | End: 2018-08-26 | Stop reason: SDUPTHER

## 2017-10-24 RX ORDER — ZOLPIDEM TARTRATE 10 MG/1
10 TABLET ORAL NIGHTLY PRN
Qty: 30 TABLET | Refills: 5 | Status: SHIPPED | OUTPATIENT
Start: 2017-10-24 | End: 2018-04-24 | Stop reason: SDUPTHER

## 2017-10-24 RX ORDER — MULTIVIT WITH MINERALS/LUTEIN
1000 TABLET ORAL DAILY
COMMUNITY

## 2017-10-24 ASSESSMENT — PATIENT HEALTH QUESTIONNAIRE - PHQ9
1. LITTLE INTEREST OR PLEASURE IN DOING THINGS: 0
2. FEELING DOWN, DEPRESSED OR HOPELESS: 0
SUM OF ALL RESPONSES TO PHQ QUESTIONS 1-9: 0
SUM OF ALL RESPONSES TO PHQ9 QUESTIONS 1 & 2: 0

## 2017-10-24 ASSESSMENT — ENCOUNTER SYMPTOMS
COUGH: 0
SORE THROAT: 0
ABDOMINAL PAIN: 0
SINUS PRESSURE: 0
DIARRHEA: 0
CONSTIPATION: 0
SHORTNESS OF BREATH: 0
NAUSEA: 0
TROUBLE SWALLOWING: 0
RHINORRHEA: 0

## 2017-10-24 NOTE — PROGRESS NOTES
 Marital status:      Spouse name: N/A    Number of children: N/A    Years of education: N/A     Occupational History    Not on file. Social History Main Topics    Smoking status: Former Smoker     Types: Cigarettes     Quit date: 4/18/1992    Smokeless tobacco: Never Used    Alcohol use 1.2 oz/week     2 Glasses of wine per week      Comment: DAILY    Drug use: No    Sexual activity: Not on file     Other Topics Concern    Not on file     Social History Narrative    No narrative on file      No Known Allergies   Current Outpatient Prescriptions   Medication Sig Dispense Refill    Ascorbic Acid (VITAMIN C) 1000 MG tablet Take 1,000 mg by mouth daily      zolpidem (AMBIEN) 10 MG tablet Take 1 tablet by mouth nightly as needed for Sleep 30 tablet 5    atorvastatin (LIPITOR) 20 MG tablet Take 1 tablet by mouth every evening 90 tablet 2    irbesartan (AVAPRO) 150 MG tablet Take 1 tablet by mouth nightly 90 tablet 2    fenofibrate 160 MG tablet Take 1 tablet by mouth daily 90 tablet 2    Multiple Vitamin (MULTIVITAMIN) capsule Take 1 capsule by mouth      ondansetron (ZOFRAN) 4 MG tablet Take 4 mg by mouth      Omega-3 Fatty Acids (FISH OIL) 1000 MG CAPS Take 1,000 mg by mouth daily Indications: SUPPLEMENT       No current facility-administered medications for this visit. Review of Systems   Constitutional: Negative for fatigue, fever and unexpected weight change. HENT: Negative for ear pain, postnasal drip, rhinorrhea, sinus pressure, sore throat and trouble swallowing. Eyes: Negative for visual disturbance. Respiratory: Negative for cough and shortness of breath. Cardiovascular: Negative for chest pain, palpitations and leg swelling. Gastrointestinal: Negative for abdominal pain (see hpt), constipation, diarrhea and nausea. Genitourinary: Negative for dysuria, frequency and hematuria. Musculoskeletal: Negative for arthralgias and myalgias. Skin: Negative for rash. Neurological: Negative for dizziness, syncope, weakness, light-headedness and headaches. Hematological: Negative for adenopathy. Does not bruise/bleed easily. /64   Pulse 69   Ht 6' (1.829 m)   Wt 206 lb (93.4 kg)   SpO2 95%   BMI 27.94 kg/m²    Physical Exam   Constitutional: He is oriented to person, place, and time. He appears well-developed and well-nourished. No distress. HENT:   Head: Normocephalic and atraumatic. Right Ear: External ear normal.   Left Ear: External ear normal.   Nose: Nose normal.   Mouth/Throat: Oropharynx is clear and moist.   Tympanic membranes normal   Eyes: Conjunctivae and EOM are normal. Pupils are equal, round, and reactive to light. Right eye exhibits no discharge. Left eye exhibits no discharge. No scleral icterus. Fundiscopic exam normal   Neck: No JVD present. No thyromegaly present. Cardiovascular: Normal rate, regular rhythm, normal heart sounds and intact distal pulses. Exam reveals no gallop. No murmur heard. Pulses:       Dorsalis pedis pulses are 2+ on the right side, and 2+ on the left side. Posterior tibial pulses are 2+ on the right side, and 2+ on the left side. No Carotid or abdominal bruits   Pulmonary/Chest: Effort normal and breath sounds normal. No respiratory distress. He exhibits no tenderness. Abdominal: Soft. Bowel sounds are normal. He exhibits no distension and no mass. There is no tenderness. Musculoskeletal: Normal range of motion. He exhibits no edema or tenderness. Lymphadenopathy:     He has no cervical adenopathy. Neurological: He is alert and oriented to person, place, and time. No cranial nerve deficit. Coordination normal.   No focal weakness   Skin: Skin is dry. No rash noted. No erythema. Psychiatric: He has a normal mood and affect. His behavior is normal. Judgment and thought content normal.   Vitals reviewed.           Lab Results   Component Value Date    CHOL 166 08/04/2017     Lab Results Component Value Date    TRIG 176 08/04/2017     Lab Results   Component Value Date    HDL 29 (L) 08/04/2017     Lab Results   Component Value Date    LDLCALC 102 08/04/2017     No results found for: LABVLDL, VLDL  No results found for: [facility]   Lab Results   Component Value Date     08/04/2017    K 4.4 08/04/2017     08/04/2017    CO2 18 (L) 08/04/2017    BUN 25 (H) 08/04/2017    CREATININE 1.3 (H) 08/04/2017    GLUCOSE 91 08/04/2017    CALCIUM 9.6 08/04/2017    PROT 7.0 08/04/2017    LABALBU 4.2 08/04/2017    BILITOT 0.3 08/04/2017    ALKPHOS 23 (L) 08/04/2017    AST 20 08/04/2017    ALT 17 08/04/2017    LABGLOM 56 (A) 08/04/2017    GLOB 2.2 04/18/2017        Lab Results   Component Value Date    WBC 5.0 08/04/2017    HGB 12.5 (L) 08/04/2017    HCT 38.5 (L) 08/04/2017    MCV 91.0 08/04/2017     08/04/2017      PSA=3.45  (10/5/17)    Patient Active Problem List   Diagnosis    CKD (chronic kidney disease) stage 3, GFR 30-59 ml/min    [facility]par[facility], [facility] [address]    Prostate carcinoma ([facility] [facility]    Hyperlipidemia    Hyperglycemia    Hypertension       DIAGNOSES:    ICD-10-CM ICD-9-CM    1. Prostate carcinoma (HCC) C61 185 PSA, Diagnostic      CBC Auto Differential   2. Essential hypertension I10 401.9 Comprehensive Metabolic Panel   3. Familial hypercholesterolemia E78.01 272.0 Comprehensive Metabolic Panel      Lipid Panel   4. CKD (chronic kidney disease) stage 3, GFR 30-59 ml/min N18.3 585.3 CBC Auto Differential        Orders Placed This Encounter   Procedures    PSA, Diagnostic    Comprehensive Metabolic Panel    CBC Auto Differential    Lipid Panel          New Prescriptions    No medications on file        ASSESSMENT/PLAN:   His PSA has gradually increased and he will be seen at [facility] again in a few months with follow-up PSA. Anti-antiandrogen therapy may be initiated at that time. His blood pressure is well controlled.  Lipids were acceptable but likes triglycerides a bit better and HDL up some. Renal function is stable. We will set up PSA for him in 4 months at [facility] which she can then take to [facility] with him. I'll see him back in 6 months with CMP lipid and CBC.

## 2017-10-30 ENCOUNTER — NURSE ONLY (OUTPATIENT)
Dept: INTERNAL MEDICINE | Age: 62
End: 2017-10-30
Payer: COMMERCIAL

## 2017-10-30 DIAGNOSIS — Z23 IMMUNIZATION DUE: Primary | ICD-10-CM

## 2017-10-30 PROCEDURE — 90686 IIV4 VACC NO PRSV 0.5 ML IM: CPT | Performed by: INTERNAL MEDICINE

## 2017-10-30 PROCEDURE — 90471 IMMUNIZATION ADMIN: CPT | Performed by: INTERNAL MEDICINE

## 2018-02-01 ENCOUNTER — TRANSCRIBE ORDERS (OUTPATIENT)
Dept: ADMINISTRATIVE | Facility: HOSPITAL | Age: 63
End: 2018-02-01

## 2018-02-01 ENCOUNTER — APPOINTMENT (OUTPATIENT)
Dept: LAB | Facility: HOSPITAL | Age: 63
End: 2018-02-01
Attending: INTERNAL MEDICINE

## 2018-02-01 DIAGNOSIS — C61 PROSTATE CARCINOMA (HCC): Chronic | ICD-10-CM

## 2018-02-01 DIAGNOSIS — C61 PROSTATE CARCINOMA (HCC): Primary | ICD-10-CM

## 2018-02-01 LAB — PSA SERPL-MCNC: 2.45 NG/ML (ref 0–4)

## 2018-02-01 PROCEDURE — 36415 COLL VENOUS BLD VENIPUNCTURE: CPT

## 2018-02-01 PROCEDURE — 84153 ASSAY OF PSA TOTAL: CPT | Performed by: INTERNAL MEDICINE

## 2018-02-18 ENCOUNTER — OFFICE VISIT (OUTPATIENT)
Dept: URGENT CARE | Age: 63
End: 2018-02-18
Payer: COMMERCIAL

## 2018-02-18 VITALS
BODY MASS INDEX: 28.77 KG/M2 | WEIGHT: 212.4 LBS | OXYGEN SATURATION: 97 % | DIASTOLIC BLOOD PRESSURE: 78 MMHG | RESPIRATION RATE: 18 BRPM | TEMPERATURE: 98.7 F | SYSTOLIC BLOOD PRESSURE: 127 MMHG | HEIGHT: 72 IN | HEART RATE: 71 BPM

## 2018-02-18 DIAGNOSIS — R52 BODY ACHES: ICD-10-CM

## 2018-02-18 DIAGNOSIS — J02.9 SORE THROAT: ICD-10-CM

## 2018-02-18 DIAGNOSIS — J02.9 ACUTE PHARYNGITIS, UNSPECIFIED ETIOLOGY: Primary | ICD-10-CM

## 2018-02-18 LAB
INFLUENZA A ANTIBODY: NEGATIVE
INFLUENZA B ANTIBODY: NEGATIVE
S PYO AG THROAT QL: NORMAL

## 2018-02-18 PROCEDURE — 87880 STREP A ASSAY W/OPTIC: CPT | Performed by: SPECIALIST

## 2018-02-18 PROCEDURE — 87804 INFLUENZA ASSAY W/OPTIC: CPT | Performed by: SPECIALIST

## 2018-02-18 PROCEDURE — 99213 OFFICE O/P EST LOW 20 MIN: CPT | Performed by: SPECIALIST

## 2018-02-18 RX ORDER — AZITHROMYCIN 250 MG/1
TABLET, FILM COATED ORAL
Qty: 1 PACKET | Refills: 0 | Status: SHIPPED | OUTPATIENT
Start: 2018-02-18 | End: 2018-02-28

## 2018-02-18 ASSESSMENT — ENCOUNTER SYMPTOMS
SORE THROAT: 1
RHINORRHEA: 1
COUGH: 1

## 2018-02-18 NOTE — PROGRESS NOTES
[address] [facility] [address] [facility]  [address] [facility] [address] [facility] [address]  Dept: [phone]  Loc: [phone]    Em Joshua is a 57 y.o. male who presents today for his medical conditions/complaints as noted below. Em Joshua is c/o of Headache; Generalized Body Aches; Cough; Congestion; and Pharyngitis        HPI:     Headache    This is a new problem. The current episode started yesterday. The problem has been resolved. The pain is located in the frontal region. The pain does not radiate. The pain quality is similar to prior headaches. The quality of the pain is described as aching. Associated symptoms include coughing, rhinorrhea and a sore throat. Generalized Body Aches   Associated symptoms include coughing, headaches and a sore throat. Cough   Associated symptoms include headaches, rhinorrhea and a sore throat. Pharyngitis   Associated symptoms include coughing, headaches and a sore throat.        Past Medical History:   Diagnosis Date    Arthritis     OSTEO    CKD (chronic kidney disease) stage 3, GFR 30-59 ml/min 8/10/2017    Hyperglycemia 8/10/2017    Hyperlipidemia     Hypertension     Prostate carcinoma ([facility] [address] 8/10/2017      Past Surgical History:   Procedure Laterality Date    KNEE SURGERY Left     knee ligamentous reconstruction with residual degenerative arthritis    PARATHYROIDECTOMY Left 09/16/2016    parathyroid resection single tumor removal left inferior/adenoma with associated hyperparathyroidism     PROSTATECTOMY  2012    TONSILLECTOMY      TOTAL KNEE ARTHROPLASTY Left 5/17/2017    KNEE TOTAL ARTHROPLASTY performed by Miguel Angel Cuba MD at [address] [facility] History   Problem Relation Age of Onset    Cancer Mother 52     large cell nonkeratinizing squamous cell of the pancreas                          COPD Father     Cancer Father      Hodgkin's disease head, face, neck    Stroke Father      ischemic       Social History   Substance equal, round, and reactive to light. Neck: Normal range of motion. Neck supple. Cardiovascular: Normal rate, regular rhythm, normal heart sounds and intact distal pulses. Pulmonary/Chest: Effort normal and breath sounds normal.   Musculoskeletal: Normal range of motion. Neurological: He is alert and oriented to person, place, and time. Skin: Skin is warm and dry. Psychiatric: He has a normal mood and affect. His behavior is normal. Judgment and thought content normal.   Nursing note and vitals reviewed. /78   Pulse 71   Temp 98.7 °F (37.1 °C) (Oral)   Resp 18   Ht 6' (1.829 m)   Wt 212 lb 6.4 oz (96.3 kg)   SpO2 97%   BMI 28.81 kg/m²     Assessment:      1. Acute pharyngitis, unspecified etiology     2. Sore throat  POCT rapid strep A   3. Body aches  POCT Influenza A/B       Plan:      Orders Placed This Encounter   Procedures    POCT rapid strep A    POCT Influenza A/B       No Follow-up on file. Orders Placed This Encounter   Procedures    POCT rapid strep A    POCT Influenza A/B     Orders Placed This Encounter   Medications    azithromycin (ZITHROMAX Z-DONAL) 250 MG tablet     Sig: Take as directed     Dispense:  1 packet     Refill:  0       Patient given educational materials - see patient instructions. Discussed use, benefit, and side effects of prescribed medications. All patient questions answered. Pt voiced understanding. Reviewed health maintenance. Instructed to continue current medications, diet and exercise. Patient agreed with treatment plan. Follow up as directed. Patient Instructions       Patient Education        Sore Throat: Care Instructions  Your Care Instructions    Infection by bacteria or a virus causes most sore throats. Cigarette smoke, dry air, air pollution, allergies, and yelling can also cause a sore throat. Sore throats can be painful and annoying. Fortunately, most sore throats go away on their own.  If you have a bacterial infection, your

## 2018-04-17 DIAGNOSIS — N18.30 CKD (CHRONIC KIDNEY DISEASE) STAGE 3, GFR 30-59 ML/MIN (HCC): Chronic | ICD-10-CM

## 2018-04-17 DIAGNOSIS — I10 ESSENTIAL HYPERTENSION: ICD-10-CM

## 2018-04-17 DIAGNOSIS — C61 PROSTATE CARCINOMA (HCC): Chronic | ICD-10-CM

## 2018-04-17 DIAGNOSIS — E78.01 FAMILIAL HYPERCHOLESTEROLEMIA: Chronic | ICD-10-CM

## 2018-04-17 DIAGNOSIS — C61 PROSTATE CANCER (HCC): ICD-10-CM

## 2018-04-17 LAB
ALBUMIN SERPL-MCNC: 4.3 G/DL (ref 3.5–5.2)
ALP BLD-CCNC: 19 U/L (ref 40–130)
ALT SERPL-CCNC: 21 U/L (ref 5–41)
ANION GAP SERPL CALCULATED.3IONS-SCNC: 18 MMOL/L (ref 7–19)
AST SERPL-CCNC: 22 U/L (ref 5–40)
BASOPHILS ABSOLUTE: 0 K/UL (ref 0–0.2)
BASOPHILS RELATIVE PERCENT: 0.6 % (ref 0–1)
BILIRUB SERPL-MCNC: 0.3 MG/DL (ref 0.2–1.2)
BUN BLDV-MCNC: 29 MG/DL (ref 8–23)
CALCIUM SERPL-MCNC: 9.6 MG/DL (ref 8.8–10.2)
CHLORIDE BLD-SCNC: 104 MMOL/L (ref 98–111)
CHOLESTEROL, TOTAL: 176 MG/DL (ref 160–199)
CO2: 21 MMOL/L (ref 22–29)
CREAT SERPL-MCNC: 1.2 MG/DL (ref 0.5–1.2)
EOSINOPHILS ABSOLUTE: 0.1 K/UL (ref 0–0.6)
EOSINOPHILS RELATIVE PERCENT: 1.7 % (ref 0–5)
GFR NON-AFRICAN AMERICAN: >60
GLUCOSE BLD-MCNC: 104 MG/DL (ref 74–109)
HCT VFR BLD CALC: 41.9 % (ref 42–52)
HDLC SERPL-MCNC: 32 MG/DL (ref 55–121)
HEMOGLOBIN: 14 G/DL (ref 14–18)
LDL CHOLESTEROL CALCULATED: 89 MG/DL
LYMPHOCYTES ABSOLUTE: 1.5 K/UL (ref 1.1–4.5)
LYMPHOCYTES RELATIVE PERCENT: 28.2 % (ref 20–40)
MCH RBC QN AUTO: 30.6 PG (ref 27–31)
MCHC RBC AUTO-ENTMCNC: 33.4 G/DL (ref 33–37)
MCV RBC AUTO: 91.5 FL (ref 80–94)
MONOCYTES ABSOLUTE: 0.5 K/UL (ref 0–0.9)
MONOCYTES RELATIVE PERCENT: 9 % (ref 0–10)
NEUTROPHILS ABSOLUTE: 3.2 K/UL (ref 1.5–7.5)
NEUTROPHILS RELATIVE PERCENT: 59.9 % (ref 50–65)
PDW BLD-RTO: 12.3 % (ref 11.5–14.5)
PLATELET # BLD: 243 K/UL (ref 130–400)
PMV BLD AUTO: 10.2 FL (ref 9.4–12.4)
POTASSIUM SERPL-SCNC: 4.6 MMOL/L (ref 3.5–5)
PROSTATE SPECIFIC ANTIGEN: 3.39 NG/ML (ref 0–4)
RBC # BLD: 4.58 M/UL (ref 4.7–6.1)
SODIUM BLD-SCNC: 143 MMOL/L (ref 136–145)
TOTAL PROTEIN: 6.7 G/DL (ref 6.6–8.7)
TRIGL SERPL-MCNC: 277 MG/DL (ref 0–149)
WBC # BLD: 5.3 K/UL (ref 4.8–10.8)

## 2018-04-24 ENCOUNTER — OFFICE VISIT (OUTPATIENT)
Dept: INTERNAL MEDICINE | Age: 63
End: 2018-04-24
Payer: COMMERCIAL

## 2018-04-24 VITALS
DIASTOLIC BLOOD PRESSURE: 82 MMHG | HEIGHT: 72 IN | HEART RATE: 85 BPM | BODY MASS INDEX: 28.85 KG/M2 | SYSTOLIC BLOOD PRESSURE: 120 MMHG | WEIGHT: 213 LBS | RESPIRATION RATE: 97 BRPM

## 2018-04-24 DIAGNOSIS — I10 ESSENTIAL HYPERTENSION: ICD-10-CM

## 2018-04-24 DIAGNOSIS — L57.0 ACTINIC KERATOSIS: ICD-10-CM

## 2018-04-24 DIAGNOSIS — C61 PROSTATE CARCINOMA (HCC): Chronic | ICD-10-CM

## 2018-04-24 DIAGNOSIS — E78.01 FAMILIAL HYPERCHOLESTEROLEMIA: Chronic | ICD-10-CM

## 2018-04-24 DIAGNOSIS — N18.30 CKD (CHRONIC KIDNEY DISEASE) STAGE 3, GFR 30-59 ML/MIN (HCC): Primary | Chronic | ICD-10-CM

## 2018-04-24 DIAGNOSIS — R73.9 HYPERGLYCEMIA: Chronic | ICD-10-CM

## 2018-04-24 PROCEDURE — 99214 OFFICE O/P EST MOD 30 MIN: CPT | Performed by: INTERNAL MEDICINE

## 2018-04-24 RX ORDER — ZOLPIDEM TARTRATE 10 MG/1
10 TABLET ORAL NIGHTLY PRN
Qty: 30 TABLET | Refills: 5 | Status: SHIPPED | OUTPATIENT
Start: 2018-04-24 | End: 2021-02-17 | Stop reason: SDUPTHER

## 2018-04-24 ASSESSMENT — ENCOUNTER SYMPTOMS
ABDOMINAL PAIN: 0
SHORTNESS OF BREATH: 0
COUGH: 0
NAUSEA: 0
DIARRHEA: 0

## 2018-08-13 ENCOUNTER — APPOINTMENT (OUTPATIENT)
Dept: LAB | Facility: HOSPITAL | Age: 63
End: 2018-08-13
Attending: INTERNAL MEDICINE

## 2018-08-13 ENCOUNTER — TRANSCRIBE ORDERS (OUTPATIENT)
Dept: ADMINISTRATIVE | Facility: HOSPITAL | Age: 63
End: 2018-08-13

## 2018-08-13 DIAGNOSIS — C61 PROSTATE CARCINOMA (HCC): Primary | ICD-10-CM

## 2018-08-13 LAB — PSA SERPL-MCNC: 2.89 NG/ML (ref 0–4)

## 2018-08-13 PROCEDURE — 36415 COLL VENOUS BLD VENIPUNCTURE: CPT

## 2018-08-13 PROCEDURE — 84153 ASSAY OF PSA TOTAL: CPT | Performed by: INTERNAL MEDICINE

## 2018-08-27 RX ORDER — ATORVASTATIN CALCIUM 20 MG/1
TABLET, FILM COATED ORAL
Qty: 90 TABLET | Refills: 2 | Status: SHIPPED | OUTPATIENT
Start: 2018-08-27 | End: 2019-05-01 | Stop reason: SDUPTHER

## 2018-08-27 NOTE — TELEPHONE ENCOUNTER
Justice Dior called requesting a refill of the below medication which has been pended for you:     Requested Prescriptions     Pending Prescriptions Disp Refills    atorvastatin (LIPITOR) 20 MG tablet [Pharmacy Med Name: ATORVASTATIN TABS 20MG] 90 tablet 2     Sig: TAKE 1 TABLET EVERY EVENING       Last Appointment Date: 4/24/2018  Next Appointment Date: 10/23/2018    No Known Allergies

## 2018-09-11 RX ORDER — IRBESARTAN 150 MG/1
TABLET ORAL
Qty: 90 TABLET | Refills: 3 | Status: SHIPPED | OUTPATIENT
Start: 2018-09-11 | End: 2019-05-01 | Stop reason: SDUPTHER

## 2018-09-11 RX ORDER — FENOFIBRATE 160 MG/1
TABLET ORAL
Qty: 90 TABLET | Refills: 3 | Status: SHIPPED | OUTPATIENT
Start: 2018-09-11 | End: 2019-05-01 | Stop reason: SDUPTHER

## 2018-10-16 DIAGNOSIS — N18.30 CKD (CHRONIC KIDNEY DISEASE) STAGE 3, GFR 30-59 ML/MIN (HCC): Chronic | ICD-10-CM

## 2018-10-16 DIAGNOSIS — I10 ESSENTIAL HYPERTENSION: ICD-10-CM

## 2018-10-16 DIAGNOSIS — E78.01 FAMILIAL HYPERCHOLESTEROLEMIA: Chronic | ICD-10-CM

## 2018-10-16 LAB
ALBUMIN SERPL-MCNC: 4.3 G/DL (ref 3.5–5.2)
ALP BLD-CCNC: 19 U/L (ref 40–130)
ALT SERPL-CCNC: 22 U/L (ref 5–41)
ANION GAP SERPL CALCULATED.3IONS-SCNC: 13 MMOL/L (ref 7–19)
AST SERPL-CCNC: 22 U/L (ref 5–40)
BASOPHILS ABSOLUTE: 0 K/UL (ref 0–0.2)
BASOPHILS RELATIVE PERCENT: 0.5 % (ref 0–1)
BILIRUB SERPL-MCNC: 0.3 MG/DL (ref 0.2–1.2)
BUN BLDV-MCNC: 30 MG/DL (ref 8–23)
CALCIUM SERPL-MCNC: 9.2 MG/DL (ref 8.8–10.2)
CHLORIDE BLD-SCNC: 105 MMOL/L (ref 98–111)
CHOLESTEROL, TOTAL: 172 MG/DL (ref 160–199)
CO2: 22 MMOL/L (ref 22–29)
CREAT SERPL-MCNC: 1.3 MG/DL (ref 0.5–1.2)
EOSINOPHILS ABSOLUTE: 0.1 K/UL (ref 0–0.6)
EOSINOPHILS RELATIVE PERCENT: 1.2 % (ref 0–5)
GFR NON-AFRICAN AMERICAN: 55
GLUCOSE BLD-MCNC: 98 MG/DL (ref 74–109)
HCT VFR BLD CALC: 42.3 % (ref 42–52)
HDLC SERPL-MCNC: 33 MG/DL (ref 55–121)
HEMOGLOBIN: 14.1 G/DL (ref 14–18)
LDL CHOLESTEROL CALCULATED: 91 MG/DL
LYMPHOCYTES ABSOLUTE: 1.4 K/UL (ref 1.1–4.5)
LYMPHOCYTES RELATIVE PERCENT: 23.1 % (ref 20–40)
MCH RBC QN AUTO: 30.6 PG (ref 27–31)
MCHC RBC AUTO-ENTMCNC: 33.3 G/DL (ref 33–37)
MCV RBC AUTO: 91.8 FL (ref 80–94)
MONOCYTES ABSOLUTE: 0.5 K/UL (ref 0–0.9)
MONOCYTES RELATIVE PERCENT: 8.6 % (ref 0–10)
NEUTROPHILS ABSOLUTE: 4 K/UL (ref 1.5–7.5)
NEUTROPHILS RELATIVE PERCENT: 66.1 % (ref 50–65)
PDW BLD-RTO: 12.3 % (ref 11.5–14.5)
PLATELET # BLD: 223 K/UL (ref 130–400)
PMV BLD AUTO: 10 FL (ref 9.4–12.4)
POTASSIUM SERPL-SCNC: 4.7 MMOL/L (ref 3.5–5)
RBC # BLD: 4.61 M/UL (ref 4.7–6.1)
SODIUM BLD-SCNC: 140 MMOL/L (ref 136–145)
TOTAL PROTEIN: 6.8 G/DL (ref 6.6–8.7)
TRIGL SERPL-MCNC: 238 MG/DL (ref 0–149)
WBC # BLD: 6.1 K/UL (ref 4.8–10.8)

## 2018-10-23 ENCOUNTER — OFFICE VISIT (OUTPATIENT)
Dept: INTERNAL MEDICINE | Age: 63
End: 2018-10-23
Payer: COMMERCIAL

## 2018-10-23 VITALS
SYSTOLIC BLOOD PRESSURE: 138 MMHG | OXYGEN SATURATION: 95 % | BODY MASS INDEX: 28.85 KG/M2 | WEIGHT: 213 LBS | HEART RATE: 58 BPM | HEIGHT: 72 IN | DIASTOLIC BLOOD PRESSURE: 88 MMHG

## 2018-10-23 DIAGNOSIS — I10 ESSENTIAL HYPERTENSION: Primary | ICD-10-CM

## 2018-10-23 DIAGNOSIS — E78.2 MIXED HYPERLIPIDEMIA: Chronic | ICD-10-CM

## 2018-10-23 DIAGNOSIS — C61 PROSTATE CANCER (HCC): ICD-10-CM

## 2018-10-23 PROBLEM — N18.30 CKD (CHRONIC KIDNEY DISEASE) STAGE 3, GFR 30-59 ML/MIN (HCC): Chronic | Status: RESOLVED | Noted: 2017-08-10 | Resolved: 2018-10-23

## 2018-10-23 PROBLEM — E78.5 HYPERLIPIDEMIA: Chronic | Status: RESOLVED | Noted: 2017-08-10 | Resolved: 2018-10-23

## 2018-10-23 PROCEDURE — 99396 PREV VISIT EST AGE 40-64: CPT | Performed by: INTERNAL MEDICINE

## 2018-10-23 ASSESSMENT — ENCOUNTER SYMPTOMS
ABDOMINAL PAIN: 0
COUGH: 0
BLOOD IN STOOL: 0
SHORTNESS OF BREATH: 0
RHINORRHEA: 0
SORE THROAT: 0
CONSTIPATION: 0
COLOR CHANGE: 0
SINUS PRESSURE: 0
NAUSEA: 0
TROUBLE SWALLOWING: 0
BACK PAIN: 0
DIARRHEA: 0

## 2018-10-23 ASSESSMENT — PATIENT HEALTH QUESTIONNAIRE - PHQ9
SUM OF ALL RESPONSES TO PHQ9 QUESTIONS 1 & 2: 0
2. FEELING DOWN, DEPRESSED OR HOPELESS: 0
1. LITTLE INTEREST OR PLEASURE IN DOING THINGS: 0
SUM OF ALL RESPONSES TO PHQ QUESTIONS 1-9: 0
SUM OF ALL RESPONSES TO PHQ QUESTIONS 1-9: 0

## 2018-11-12 ENCOUNTER — OFFICE VISIT (OUTPATIENT)
Dept: URGENT CARE | Age: 63
End: 2018-11-12
Payer: COMMERCIAL

## 2018-11-12 VITALS
DIASTOLIC BLOOD PRESSURE: 62 MMHG | RESPIRATION RATE: 18 BRPM | SYSTOLIC BLOOD PRESSURE: 120 MMHG | BODY MASS INDEX: 28.99 KG/M2 | OXYGEN SATURATION: 95 % | WEIGHT: 214 LBS | HEART RATE: 79 BPM | HEIGHT: 72 IN | TEMPERATURE: 97.3 F

## 2018-11-12 DIAGNOSIS — S61.012A LACERATION OF LEFT THUMB WITHOUT FOREIGN BODY WITHOUT DAMAGE TO NAIL, INITIAL ENCOUNTER: Primary | ICD-10-CM

## 2018-11-12 PROCEDURE — 99213 OFFICE O/P EST LOW 20 MIN: CPT | Performed by: SPECIALIST

## 2018-11-12 PROCEDURE — 90471 IMMUNIZATION ADMIN: CPT | Performed by: SPECIALIST

## 2018-11-12 PROCEDURE — 90715 TDAP VACCINE 7 YRS/> IM: CPT | Performed by: SPECIALIST

## 2018-11-12 NOTE — PATIENT INSTRUCTIONS
doctor told you how to care for your cut, follow your doctor's instructions. If you did not get instructions, follow this general advice:  ? Do not put any kind of ointment, cream, or lotion over the area. This can make the adhesive fall off too soon. ? After the first 24 to 48 hours, wash around the cut with clean water 2 times a day. Do not use hydrogen peroxide or alcohol, which can slow healing. ? If the doctor told you to use a bandage, put on a new bandage after cleaning the cut or if the bandage gets wet or dirty. · Prop up the sore area on a pillow anytime you sit or lie down during the next 3 days. Try to keep it above the level of your heart. This will help reduce swelling. · Leave the skin adhesive on your skin until it falls off on its own. This may take 5 to 10 days. · Do not scratch, rub, or pick at the adhesive. · Do not put the sticky part of a bandage directly on the adhesive. · Avoid any activity that could cause your cut to reopen. · Be safe with medicines. Read and follow all instructions on the label. ? If the doctor gave you a prescription medicine for pain, take it as prescribed. ? If you are not taking a prescription pain medicine, ask your doctor if you can take an over-the-counter medicine. When should you call for help? Call your doctor now or seek immediate medical care if:    · You have new pain, or your pain gets worse.     · The skin near the cut is cold or pale or changes color.     · You have tingling, weakness, or numbness near the cut.     · The cut starts to bleed.     · You have trouble moving the area near the cut.     · You have symptoms of infection, such as:  ? Increased pain, swelling, warmth, or redness around the cut.  ? Red streaks leading from the cut.  ? Pus draining from the cut.  ? A fever.    Watch closely for changes in your health, and be sure to contact your doctor if:    · The cut reopens.     · You do not get better as expected.    Where can you learn

## 2018-11-12 NOTE — PROGRESS NOTES
[address] [facility] [address] [place] [id number] [facility]  [address] [place] [address]  Dept: [phone]  Loc: [phone]    Merlyn Torres is a 57 y.o. male who presents today for his medical conditions/complaintsas noted below. Merlyn Torres is c/o of Laceration        HPI:     Laceration    The incident occurred less than 1 hour ago. The laceration is located on the left hand. Size: approximately 0.5cm. The laceration mechanism was a clean knife. The pain is at a severity of 0/10. The patient is experiencing no pain. He reports no foreign bodies present. His tetanus status is unknown.        Past Medical History:   Diagnosis Date    Arthritis     OSTEO    CKD (chronic kidney disease) stage 3, GFR 30-59 ml/min ([facility]) 8/10/2017    Hyperglycemia 8/10/2017    Hyperlipidemia     Hypertension     Mixed hyperlipidemia 8/10/2017    Prostate carcinoma ([address] 8/10/2017     Past Surgical History:   Procedure Laterality Date    KNEE SURGERY Left     knee ligamentous reconstruction with residual degenerative arthritis    PARATHYROIDECTOMY Left 09/16/2016    parathyroid resection single tumor removal left inferior/adenoma with associated hyperparathyroidism     PROSTATECTOMY  2012    TONSILLECTOMY      TOTAL KNEE ARTHROPLASTY Left 5/17/2017    KNEE TOTAL ARTHROPLASTY performed by Duaine Harada, MD at [address] History   Problem Relation Age of Onset    Cancer Mother 52        large cell nonkeratinizing squamous cell of the pancreas                          COPD Father     Cancer Father         Hodgkin's disease head, face, neck    Stroke Father         ischemic       Social History   Substance Use Topics    Smoking status: Former Smoker     Packs/day: 1.00     Years: 20.00     Types: Cigarettes     Quit date: 4/18/1992    Smokeless tobacco: Never Used    Alcohol use 1.2 oz/week     2 Glasses of wine per week      Comment: DAILY      Current Outpatient Prescriptions   Medication

## 2019-02-14 ENCOUNTER — TRANSCRIBE ORDERS (OUTPATIENT)
Dept: LAB | Facility: HOSPITAL | Age: 64
End: 2019-02-14

## 2019-02-14 ENCOUNTER — APPOINTMENT (OUTPATIENT)
Dept: LAB | Facility: HOSPITAL | Age: 64
End: 2019-02-14

## 2019-02-14 DIAGNOSIS — C61 MALIGNANT NEOPLASM OF PROSTATE (HCC): Primary | ICD-10-CM

## 2019-02-14 LAB — PSA SERPL-MCNC: 2.6 NG/ML (ref 0–4)

## 2019-02-14 PROCEDURE — 36415 COLL VENOUS BLD VENIPUNCTURE: CPT

## 2019-02-14 PROCEDURE — 84153 ASSAY OF PSA TOTAL: CPT | Performed by: INTERNAL MEDICINE

## 2019-03-12 ENCOUNTER — OFFICE VISIT (OUTPATIENT)
Dept: GASTROENTEROLOGY | Facility: CLINIC | Age: 64
End: 2019-03-12

## 2019-03-12 VITALS
DIASTOLIC BLOOD PRESSURE: 74 MMHG | HEART RATE: 73 BPM | TEMPERATURE: 97.6 F | OXYGEN SATURATION: 98 % | WEIGHT: 216 LBS | HEIGHT: 72 IN | SYSTOLIC BLOOD PRESSURE: 124 MMHG | BODY MASS INDEX: 29.26 KG/M2

## 2019-03-12 DIAGNOSIS — Z86.010 HX OF COLONIC POLYPS: Primary | ICD-10-CM

## 2019-03-12 PROCEDURE — S0260 H&P FOR SURGERY: HCPCS | Performed by: NURSE PRACTITIONER

## 2019-03-12 RX ORDER — SODIUM, POTASSIUM,MAG SULFATES 17.5-3.13G
1 SOLUTION, RECONSTITUTED, ORAL ORAL EVERY 12 HOURS
Qty: 2 BOTTLE | Refills: 0 | Status: ON HOLD | OUTPATIENT
Start: 2019-03-12 | End: 2019-04-15

## 2019-03-12 RX ORDER — CHLORAL HYDRATE 500 MG
1000 CAPSULE ORAL
COMMUNITY
End: 2020-11-13

## 2019-03-12 NOTE — PROGRESS NOTES
Chief Complaint   Patient presents with   • Colon Cancer Screening     Pt presents today for colon recall-last colon 4/14/2014; Personal history of colon polyps      Subjective   HPI  Ezio Boss is a 63 y.o. male who presents as a referral for preventative maintenance. He has no complaints of nausea or vomiting. No change in bowels. No wt loss. No BRBPR. No melena. There no family hx for colon cancer. No abdominal pain. There was no Cologuard screening this year.  The patient's last colonoscopy was performed on 4/14/14 with findings of a polyp.  In 5-year recall.  Past Medical History:   Diagnosis Date   • Anemia    • Disease of thyroid gland    • Hepatitis     40+ YEARS AGO- UNKNOWN WHICH HEP. never treated   • Hyperlipidemia    • Hyperparathyroidism (CMS/HCC)    • Hypertension    • Insomnia    • Lung nodule    • Nephrolithiasis    • Osteoarthritis    • Prostate cancer (CMS/HCC)      Past Surgical History:   Procedure Laterality Date   • COLONOSCOPY  04/14/2014    5mm hyperplastic polyp in descending colon; Diverticulosis; Repeat 5 years    • COLONOSCOPY  09/13/2004    Repeat 10 years    • KNEE SURGERY     • PARATHYROIDECTOMY     • [place] EXPLORE PARATHYROID GLANDS Left 9/29/2016    Procedure: Parathyroid exploration, left parathyroidectomy;  Surgeon: Edgardo Owusu MD;  Location: [facility];  Service: ENT   • PROSTATE SURGERY     • TONSILLECTOMY      1960       Current Outpatient Medications:   •  atorvastatin (LIPITOR) 20 MG tablet, Take 20 mg by mouth daily., Disp: , Rfl:   •  fenofibrate 160 MG tablet, Take 160 mg by mouth daily., Disp: , Rfl:   •  irbesartan (AVAPRO) 150 MG tablet, Take 150 mg by mouth every evening., Disp: , Rfl:   •  Multiple Vitamin (MULTIVITAMIN) capsule, Take 1 capsule by mouth daily. Take 1 capsule by oral route daily , Disp: , Rfl:   •  Omega-3 Fatty Acids (FISH OIL) 1000 MG capsule capsule, Take 1,000 mg by mouth Daily With Breakfast., Disp: , Rfl:   •  Probiotic Product (PROBIOTIC  ADVANCED PO), Take 1 tablet by mouth Daily., Disp: , Rfl:   •  sodium-potassium-magnesium sulfates (SUPREP BOWEL PREP KIT) 17.5-3.13-1.6 GM/177ML solution oral solution, Take 1 bottle by mouth Every 12 (Twelve) Hours. Split dose prep as directed by office instructions provided.  2 bottles = one kit., Disp: 2 bottle, Rfl: 0  No Known Allergies  Social History     Socioeconomic History   • Marital status:      Spouse name: Not on file   • Number of children: Not on file   • Years of education: Not on file   • Highest education level: Not on file   Social Needs   • Financial resource strain: Not on file   • Food insecurity - worry: Not on file   • Food insecurity - inability: Not on file   • Transportation needs - medical: Not on file   • Transportation needs - non-medical: Not on file   Occupational History   • Not on file   Tobacco Use   • Smoking status: Former Smoker     Last attempt to quit:      Years since quittin.2   • Smokeless tobacco: Never Used   Substance and Sexual Activity   • Alcohol use: Yes     Alcohol/week: 8.4 oz     Types: 14 Glasses of wine per week     Comment: 2 qd   • Drug use: No   • Sexual activity: Defer   Other Topics Concern   • Not on file   Social History Narrative   • Not on file     Family History   Problem Relation Age of Onset   • Cancer Mother    • Cancer Father    • Stroke Father    • Colon cancer Neg Hx    • Colon polyps Neg Hx        REVIEW OF SYSTEMS  General: well appearing, no fever chills or sweats, no unexplained wt loss  HEENT: no acute visual or hearing disturbances  Cardiovascular: No chest pain or palpitations  Pulmonary: No shortness of breath, coughing, wheezing or hemoptysis  : No burning, urgency, hematuria, or dysuria  Musculoskeletal: No joint pain or stiffness  Peripheral: no edema  Skin: No lesions or rashes  Neuro: No dizziness, headaches, stroke, syncope  Endocrine: No hot or cold intolerances  Hematological: No blood dyscrasias    Objective  "  Vitals:    03/12/19 1307   BP: 124/74   BP Location: Left arm   Patient Position: Sitting   Cuff Size: Adult   Pulse: 73   Temp: 97.6 °F (36.4 °C)   TempSrc: Tympanic   SpO2: 98%   Weight: 98 kg (216 lb)   Height: 182.9 cm (72\")     Body mass index is 29.29 kg/m².    PHYSICAL EXAM  General: age appropriate well nourished well appearing, no acute distress  Head: normocephalic and atraumatic  Global assessment-supple  Neck-No JVD noted, no lymphadenopathy  Pulmonary-clear to auscultation bilaterally, normal respiratory effort  Cardiovascular-normal rate and rhythm, normal heart sounds, S1 and S2 noted  Abdomen-soft, non tender, non distended, normal bowel sounds all 4 quadrants, no hepatosplenomegaly noted  Extremities-No clubbing cyanosis or edema  Neuro-Non focal, converses appropriately, awake, alert, oriented    Imaging Results (most recent)     None        Assessment/Plan   Ezio was seen today for colon cancer screening.    Diagnoses and all orders for this visit:    Hx of colonic polyps  -     Case Request; Standing  -     Case Request    Other orders  -     Follow Anesthesia Guidelines / Standing Orders; Future  -     Implement Anesthesia Orders Day of Procedure; Standing  -     Obtain Informed Consent; Standing  -     Verify bowel prep was successful; Standing  -     sodium-potassium-magnesium sulfates (SUPREP BOWEL PREP KIT) 17.5-3.13-1.6 GM/177ML solution oral solution; Take 1 bottle by mouth Every 12 (Twelve) Hours. Split dose prep as directed by office instructions provided.  2 bottles = one kit.      COLONOSCOPY WITH ANESTHESIA (N/A)       Body mass index is 29.29 kg/m². Patient's Body mass index is 29.29 kg/m². BMI is above normal parameters. Recommendations include: nutrition counseling.      All risks, benefits, alternatives, and indications of colonoscopy procedure have been discussed with the patient. Risks to include perforation of the colon requiring possible surgery or colostomy, risk of " bleeding from biopsies or removal of colon tissue, possibility of missing a colon polyp or cancer, or adverse drug reaction.  Benefits to include the diagnosis and management of disease of the colon and rectum. Alternatives to include barium enema, radiographic evaluation, lab testing or no intervention. Pt verbalizes understanding and agrees.

## 2019-04-15 ENCOUNTER — ANESTHESIA (OUTPATIENT)
Dept: GASTROENTEROLOGY | Facility: HOSPITAL | Age: 64
End: 2019-04-15

## 2019-04-15 ENCOUNTER — HOSPITAL ENCOUNTER (OUTPATIENT)
Facility: HOSPITAL | Age: 64
Setting detail: HOSPITAL OUTPATIENT SURGERY
Discharge: HOME | End: 2019-04-15
Attending: INTERNAL MEDICINE | Admitting: INTERNAL MEDICINE

## 2019-04-15 ENCOUNTER — ANESTHESIA EVENT (OUTPATIENT)
Dept: GASTROENTEROLOGY | Facility: HOSPITAL | Age: 64
End: 2019-04-15

## 2019-04-15 VITALS
WEIGHT: 215 LBS | RESPIRATION RATE: 15 BRPM | DIASTOLIC BLOOD PRESSURE: 74 MMHG | SYSTOLIC BLOOD PRESSURE: 105 MMHG | BODY MASS INDEX: 30.1 KG/M2 | HEART RATE: 65 BPM | OXYGEN SATURATION: 94 % | HEIGHT: 71 IN | TEMPERATURE: 97.1 F

## 2019-04-15 DIAGNOSIS — Z86.010 HX OF COLONIC POLYPS: ICD-10-CM

## 2019-04-15 PROCEDURE — 88305 TISSUE EXAM BY PATHOLOGIST: CPT | Performed by: INTERNAL MEDICINE

## 2019-04-15 PROCEDURE — 25010000002 PROPOFOL 10 MG/ML EMULSION: Performed by: NURSE ANESTHETIST, CERTIFIED REGISTERED

## 2019-04-15 PROCEDURE — 45385 COLONOSCOPY W/LESION REMOVAL: CPT | Performed by: INTERNAL MEDICINE

## 2019-04-15 RX ORDER — SODIUM CHLORIDE 0.9 % (FLUSH) 0.9 %
3 SYRINGE (ML) INJECTION AS NEEDED
Status: DISCONTINUED | OUTPATIENT
Start: 2019-04-15 | End: 2019-04-15 | Stop reason: HOSPADM

## 2019-04-15 RX ORDER — SODIUM CHLORIDE 9 MG/ML
100 INJECTION, SOLUTION INTRAVENOUS CONTINUOUS
Status: CANCELLED | OUTPATIENT
Start: 2019-04-15

## 2019-04-15 RX ORDER — SODIUM CHLORIDE 0.9 % (FLUSH) 0.9 %
3-10 SYRINGE (ML) INJECTION AS NEEDED
Status: CANCELLED | OUTPATIENT
Start: 2019-04-15

## 2019-04-15 RX ORDER — SODIUM CHLORIDE 9 MG/ML
500 INJECTION, SOLUTION INTRAVENOUS CONTINUOUS PRN
Status: DISCONTINUED | OUTPATIENT
Start: 2019-04-15 | End: 2019-04-15 | Stop reason: HOSPADM

## 2019-04-15 RX ORDER — PROPOFOL 10 MG/ML
VIAL (ML) INTRAVENOUS AS NEEDED
Status: DISCONTINUED | OUTPATIENT
Start: 2019-04-15 | End: 2019-04-15 | Stop reason: SURG

## 2019-04-15 RX ORDER — SODIUM CHLORIDE 0.9 % (FLUSH) 0.9 %
3 SYRINGE (ML) INJECTION EVERY 12 HOURS SCHEDULED
Status: CANCELLED | OUTPATIENT
Start: 2019-04-15

## 2019-04-15 RX ORDER — LIDOCAINE HYDROCHLORIDE 20 MG/ML
INJECTION, SOLUTION INFILTRATION; PERINEURAL AS NEEDED
Status: DISCONTINUED | OUTPATIENT
Start: 2019-04-15 | End: 2019-04-15 | Stop reason: SURG

## 2019-04-15 RX ADMIN — LIDOCAINE HYDROCHLORIDE 100 MG: 20 INJECTION, SOLUTION INFILTRATION; PERINEURAL at 09:38

## 2019-04-15 RX ADMIN — PROPOFOL 500 MG: 10 INJECTION, EMULSION INTRAVENOUS at 09:38

## 2019-04-15 RX ADMIN — SODIUM CHLORIDE 500 ML: 9 INJECTION, SOLUTION INTRAVENOUS at 08:17

## 2019-04-15 NOTE — ANESTHESIA PREPROCEDURE EVALUATION
Anesthesia Evaluation     no history of anesthetic complications:  NPO Solid Status: > 8 hours  NPO Liquid Status: > 2 hours           Airway   Mallampati: I  TM distance: >3 FB  Neck ROM: full  Dental          Pulmonary - normal exam    breath sounds clear to auscultation  (-) asthma, recent URI, sleep apnea, not a smoker  Cardiovascular - normal exam  Exercise tolerance: good (4-7 METS)    Rhythm: regular  Rate: normal    (+) hypertension, hyperlipidemia,   (-) pacemaker, past MI, angina, cardiac stents, CABG      Neuro/Psych  (-) seizures, TIA, CVA  GI/Hepatic/Renal/Endo    (-) GERD, liver disease, no renal disease, diabetes, hypothyroidism, hyperthyroidism    Musculoskeletal     Abdominal    Substance History      OB/GYN          Other                        Anesthesia Plan    ASA 2     MAC     intravenous induction   Anesthetic plan, all risks, benefits, and alternatives have been provided, discussed and informed consent has been obtained with: patient.

## 2019-04-15 NOTE — ANESTHESIA POSTPROCEDURE EVALUATION
"Patient: Ezio Boss    Procedure Summary     Date:  04/15/19 Room / Location:  [facility] ENDOSCOPY 6 / [facility]    Anesthesia Start:  0929 Anesthesia Stop:  1004    Procedure:  COLONOSCOPY WITH ANESTHESIA (N/A ) Diagnosis:       Hx of colonic polyps      (Hx of colonic polyps [Z86.010])    Surgeon:  Florence Dimas MD Provider:  Santhosh Holguin CRNA    Anesthesia Type:  MAC ASA Status:  2          Anesthesia Type: MAC  Last vitals  BP   99/67 (04/15/19 1001)   Temp   97.1 °F (36.2 °C) (04/15/19 0759)   Pulse   97 (04/15/19 1001)   Resp   15 (04/15/19 1001)     SpO2   93 % (04/15/19 1001)     Post Anesthesia Care and Evaluation    Patient location during evaluation: PHASE II  Patient participation: complete - patient participated  Level of consciousness: awake and alert  Pain management: adequate  Airway patency: patent  Anesthetic complications: No anesthetic complications    Cardiovascular status: acceptable  Respiratory status: acceptable  Hydration status: acceptable    Comments: Blood pressure 99/67, pulse 97, temperature 97.1 °F (36.2 °C), temperature source Temporal, resp. rate 15, height 180.3 cm (71\"), weight 97.5 kg (215 lb), SpO2 93 %.    Pt discharged from PACU based on petrona score >8      "

## 2019-04-15 NOTE — H&P
Chief Complaint:   Colon polyps    Subjective     HPI:   He had a hyperplastic polyp removed in the descending colon 5 years ago.  He is here for ongoing surveillance.    Past Medical History:   Past Medical History:   Diagnosis Date   • Anemia    • Hepatitis     40+ YEARS AGO- UNKNOWN WHICH HEP. never treated   • Hyperlipidemia    • Hyperparathyroidism (CMS/HCC)    • Hypertension    • Insomnia    • Lung nodule    • Nephrolithiasis    • Osteoarthritis    • Prostate cancer (CMS/HCC)        Past Surgical History:  Past Surgical History:   Procedure Laterality Date   • COLONOSCOPY  04/14/2014    5mm hyperplastic polyp in descending colon; Diverticulosis; Repeat 5 years    • COLONOSCOPY  09/13/2004    Repeat 10 years    • KNEE SURGERY     • PARATHYROIDECTOMY     • TX EXPLORE PARATHYROID GLANDS Left 9/29/2016    Procedure: Parathyroid exploration, left parathyroidectomy;  Surgeon: Edgardo Owusu MD;  Location: [facility];  Service: ENT   • PROSTATE SURGERY     • TONSILLECTOMY      1960        Family History:  Family History   Problem Relation Age of Onset   • Cancer Mother    • Cancer Father    • Stroke Father    • Colon cancer Neg Hx    • Colon polyps Neg Hx        Social History:   reports that he quit smoking about 27 years ago. His smoking use included cigarettes. He has never used smokeless tobacco. He reports that he drinks about 8.4 oz of alcohol per week. He reports that he does not use drugs.    Medications:   Medications Prior to Admission   Medication Sig Dispense Refill Last Dose   • atorvastatin (LIPITOR) 20 MG tablet Take 20 mg by mouth daily.   4/14/2019 at Unknown time   • fenofibrate 160 MG tablet Take 160 mg by mouth daily.   4/15/2019 at 0600   • irbesartan (AVAPRO) 150 MG tablet Take 150 mg by mouth every evening.   4/14/2019 at Unknown time   • Multiple Vitamin (MULTIVITAMIN) capsule Take 1 capsule by mouth daily. Take 1 capsule by oral route daily    4/15/2019 at 0600   • Probiotic Product  "(PROBIOTIC ADVANCED PO) Take 1 tablet by mouth Daily.   4/15/2019 at 0600   • Omega-3 Fatty Acids (FISH OIL) 1000 MG capsule capsule Take 1,000 mg by mouth Daily With Breakfast.   4/8/2019       Allergies:  Patient has no known allergies.    ROS:    General: Weight stable  Resp: No SOA  Cardiovascular: No CP      Objective     /80 (BP Location: Right arm, Patient Position: Sitting)   Pulse 80   Temp 97.1 °F (36.2 °C) (Temporal)   Resp 18   Ht 180.3 cm (71\")   Wt 97.5 kg (215 lb)   SpO2 95%   BMI 29.99 kg/m²     Physical Exam   Constitutional: Pt is oriented to person, place, and in no distress.  Eyes: No icterus  ENMT: Unremarkable   Cardiovascular: Normal rate, regular rhythm.    Pulmonary/Chest: No distress.  No audible wheezes  Abdominal: Soft.   Skin: Skin is warm and dry.   Psychiatric: Mood, memory, affect and judgment appear normal.     Assessment/Plan     Diagnosis:  Colon polyps    Anticipated Surgical Procedure:  Colonoscopy    The risks, benefits, and alternatives of colonoscopy were reviewed with the patient today.  Risks including perforation of the colon possibly requiring surgery or colostomy.  Additional risks include risk of bleeding from biopsies or removal of colon tissue.  There is also the risk of a drug reaction or problems with anesthesia.  This will be discussed with the further by the anesthesia team on the day of the procedure.  Lastly there is a possibility of missing a colon polyp or cancer.  The benefits include the diagnosis and management of disease of the colon and rectum.  Alternatives to colonoscopy include barium enema, laboratory testing, radiographic evaluation, or no intervention.  The patient verbalizes understanding and agrees.    Much of this encounter note is an electronic transcription/translation of spoken language to printed text. The electronic translation of spoken language may permit erroneous, or at times, nonsensical words or phrases to be inadvertently " transcribed; although I have reviewed the note for such errors, some may still exist.

## 2019-04-16 LAB
CYTO UR: NORMAL
LAB AP CASE REPORT: NORMAL
PATH REPORT.FINAL DX SPEC: NORMAL
PATH REPORT.GROSS SPEC: NORMAL

## 2019-05-01 ENCOUNTER — OFFICE VISIT (OUTPATIENT)
Dept: PRIMARY CARE CLINIC | Age: 64
End: 2019-05-01
Payer: MEDICARE

## 2019-05-01 VITALS
SYSTOLIC BLOOD PRESSURE: 130 MMHG | BODY MASS INDEX: 29.39 KG/M2 | TEMPERATURE: 97.5 F | WEIGHT: 217 LBS | OXYGEN SATURATION: 98 % | HEIGHT: 72 IN | DIASTOLIC BLOOD PRESSURE: 82 MMHG | HEART RATE: 80 BPM

## 2019-05-01 DIAGNOSIS — E78.2 MIXED HYPERLIPIDEMIA: Chronic | ICD-10-CM

## 2019-05-01 DIAGNOSIS — C61 PROSTATE CANCER (HCC): Primary | ICD-10-CM

## 2019-05-01 DIAGNOSIS — I10 ESSENTIAL HYPERTENSION: ICD-10-CM

## 2019-05-01 DIAGNOSIS — Z11.59 NEED FOR HEPATITIS C SCREENING TEST: ICD-10-CM

## 2019-05-01 DIAGNOSIS — E21.0 HYPERPARATHYROIDISM, PRIMARY (HCC): Chronic | ICD-10-CM

## 2019-05-01 DIAGNOSIS — Z13.6 SCREENING FOR AAA (ABDOMINAL AORTIC ANEURYSM): ICD-10-CM

## 2019-05-01 PROCEDURE — G0009 ADMIN PNEUMOCOCCAL VACCINE: HCPCS | Performed by: FAMILY MEDICINE

## 2019-05-01 PROCEDURE — 90670 PCV13 VACCINE IM: CPT | Performed by: FAMILY MEDICINE

## 2019-05-01 PROCEDURE — 99204 OFFICE O/P NEW MOD 45 MIN: CPT | Performed by: FAMILY MEDICINE

## 2019-05-01 RX ORDER — ATORVASTATIN CALCIUM 20 MG/1
TABLET, FILM COATED ORAL
Qty: 90 TABLET | Refills: 2 | Status: SHIPPED | OUTPATIENT
Start: 2019-05-01 | End: 2019-08-07 | Stop reason: SDUPTHER

## 2019-05-01 RX ORDER — IRBESARTAN 150 MG/1
TABLET ORAL
Qty: 90 TABLET | Refills: 3 | Status: SHIPPED | OUTPATIENT
Start: 2019-05-01 | End: 2019-08-07 | Stop reason: SDUPTHER

## 2019-05-01 RX ORDER — FENOFIBRATE 160 MG/1
TABLET ORAL
Qty: 90 TABLET | Refills: 3 | Status: SHIPPED | OUTPATIENT
Start: 2019-05-01 | End: 2019-08-07 | Stop reason: SDUPTHER

## 2019-05-01 ASSESSMENT — ENCOUNTER SYMPTOMS
SORE THROAT: 0
COUGH: 0
ABDOMINAL PAIN: 0
EYE ITCHING: 0
NAUSEA: 0
COLOR CHANGE: 0
SHORTNESS OF BREATH: 0
RHINORRHEA: 0

## 2019-05-01 ASSESSMENT — PATIENT HEALTH QUESTIONNAIRE - PHQ9
SUM OF ALL RESPONSES TO PHQ QUESTIONS 1-9: 0
2. FEELING DOWN, DEPRESSED OR HOPELESS: 0
SUM OF ALL RESPONSES TO PHQ9 QUESTIONS 1 & 2: 0
SUM OF ALL RESPONSES TO PHQ QUESTIONS 1-9: 0
1. LITTLE INTEREST OR PLEASURE IN DOING THINGS: 0

## 2019-05-01 NOTE — PATIENT INSTRUCTIONS
Stay on current medications. Get your labs checked in [address] of this building. Get fasting lab work done (no food or drink besides water after midnight and medicines as prescribed) before the next follow up please. Please arrive 15 minutes early to next follow up appointment in 6 months or schedule an appointment sooner if needed.

## 2019-05-01 NOTE — PROGRESS NOTES
After obtaining consent, and per orders of Dr. Power De La Vega injection of Prevnar 13 was given in the Left Deltoid by Radha Robb, [address]. Pt tolerated well and had no reactions. Pt was released for office.

## 2019-05-01 NOTE — PROGRESS NOTES
Erica Cowan is a 70 y.o. male who presents today for   Chief Complaint   Patient presents with   [facility] Establish Care     former Long pt    Knee Pain     right knee-seeing Velia Lux       [facility]  Patient is here for establishing today. He has h/o HTN and HLD and pancreatitis x 4. Had h/o hyperparathyroidism. Had parathyroidectomy. After than Ca dropped and no more pancreatitis. Pt on statin and fibrate, no CVD or CVA. Doing well w/o issues. Had some mild CKD and prior anemia. 8 yrs prostate CA free post prostatectomy. He is on meds for these conditions    No change in PMH, family, social, or surgical history unless mentioned above. Review of Systems   Constitutional: Negative for chills and fever. HENT: Negative for rhinorrhea and sore throat. Eyes: Negative for itching and visual disturbance. Respiratory: Negative for cough and shortness of breath. Cardiovascular: Negative for chest pain and palpitations. Gastrointestinal: Negative for abdominal pain and nausea. Endocrine: Negative for polydipsia and polyuria. Genitourinary: Negative for dysuria and frequency. Musculoskeletal: Negative for arthralgias and myalgias. Skin: Negative for color change and rash. Allergic/Immunologic: Negative for environmental allergies and food allergies. Neurological: Negative for dizziness and light-headedness. Hematological: Negative for adenopathy. Does not bruise/bleed easily. Psychiatric/Behavioral: Negative for dysphoric mood. The patient is not nervous/anxious.            Past Medical History:   Diagnosis Date    Arthritis     OSTEO    CKD (chronic kidney disease) stage 3, GFR 30-59 ml/min ([facility]) 8/10/2017    Hyperglycemia 8/10/2017    Hyperlipidemia     Hypertension     Mixed hyperlipidemia 8/10/2017    Prostate carcinoma ([facility] [address] 8/10/2017       Current Outpatient Medications   Medication Sig Dispense Refill    Probiotic Product (PROBIOTIC ADVANCED PO) Take by mouth      atorvastatin

## 2019-05-17 ENCOUNTER — HOSPITAL ENCOUNTER (OUTPATIENT)
Dept: ULTRASOUND IMAGING | Age: 64
Discharge: HOME | End: 2019-05-17
Payer: MEDICARE

## 2019-05-17 DIAGNOSIS — Z13.6 SCREENING FOR AAA (ABDOMINAL AORTIC ANEURYSM): ICD-10-CM

## 2019-05-17 PROCEDURE — 76706 US ABDL AORTA SCREEN AAA: CPT

## 2019-05-20 ENCOUNTER — TELEPHONE (OUTPATIENT)
Dept: PRIMARY CARE CLINIC | Age: 64
End: 2019-05-20

## 2019-05-20 NOTE — TELEPHONE ENCOUNTER
----- Message from Davy Escoto MD sent at 5/20/2019  6:48 AM CDT -----  Mild dilation. This just needs to be monitored for stability.   We will recheck in one year    Please go ahead and place order for one year

## 2019-08-07 ENCOUNTER — OFFICE VISIT (OUTPATIENT)
Dept: PRIMARY CARE CLINIC | Age: 64
End: 2019-08-07
Payer: MEDICARE

## 2019-08-07 ENCOUNTER — APPOINTMENT (OUTPATIENT)
Dept: LAB | Facility: HOSPITAL | Age: 64
End: 2019-08-07

## 2019-08-07 ENCOUNTER — TRANSCRIBE ORDERS (OUTPATIENT)
Dept: ADMINISTRATIVE | Facility: HOSPITAL | Age: 64
End: 2019-08-07

## 2019-08-07 VITALS
SYSTOLIC BLOOD PRESSURE: 132 MMHG | WEIGHT: 217 LBS | BODY MASS INDEX: 29.39 KG/M2 | HEART RATE: 88 BPM | TEMPERATURE: 97 F | OXYGEN SATURATION: 98 % | HEIGHT: 72 IN | DIASTOLIC BLOOD PRESSURE: 70 MMHG

## 2019-08-07 DIAGNOSIS — Z76.0 MEDICATION REFILL: ICD-10-CM

## 2019-08-07 DIAGNOSIS — C61 MALIGNANT NEOPLASM OF PROSTATE (HCC): Primary | ICD-10-CM

## 2019-08-07 DIAGNOSIS — Z00.00 ROUTINE GENERAL MEDICAL EXAMINATION AT A HEALTH CARE FACILITY: ICD-10-CM

## 2019-08-07 LAB — PSA SERPL-MCNC: 2.76 NG/ML (ref 0–4)

## 2019-08-07 PROCEDURE — 1123F ACP DISCUSS/DSCN MKR DOCD: CPT | Performed by: NURSE PRACTITIONER

## 2019-08-07 PROCEDURE — G0402 INITIAL PREVENTIVE EXAM: HCPCS | Performed by: NURSE PRACTITIONER

## 2019-08-07 PROCEDURE — 84153 ASSAY OF PSA TOTAL: CPT | Performed by: UROLOGY

## 2019-08-07 PROCEDURE — 3017F COLORECTAL CA SCREEN DOC REV: CPT | Performed by: NURSE PRACTITIONER

## 2019-08-07 PROCEDURE — 36415 COLL VENOUS BLD VENIPUNCTURE: CPT | Performed by: UROLOGY

## 2019-08-07 PROCEDURE — 4040F PNEUMOC VAC/ADMIN/RCVD: CPT | Performed by: NURSE PRACTITIONER

## 2019-08-07 RX ORDER — IRBESARTAN 150 MG/1
TABLET ORAL
Qty: 90 TABLET | Refills: 3 | Status: SHIPPED | OUTPATIENT
Start: 2019-08-07 | End: 2019-08-30 | Stop reason: SDUPTHER

## 2019-08-07 RX ORDER — FENOFIBRATE 160 MG/1
TABLET ORAL
Qty: 90 TABLET | Refills: 3 | Status: SHIPPED | OUTPATIENT
Start: 2019-08-07 | End: 2019-08-30 | Stop reason: SDUPTHER

## 2019-08-07 RX ORDER — ATORVASTATIN CALCIUM 20 MG/1
TABLET, FILM COATED ORAL
Qty: 90 TABLET | Refills: 3 | Status: SHIPPED | OUTPATIENT
Start: 2019-08-07 | End: 2019-08-30 | Stop reason: SDUPTHER

## 2019-08-28 ENCOUNTER — TELEPHONE (OUTPATIENT)
Dept: PRIMARY CARE CLINIC | Age: 64
End: 2019-08-28

## 2019-08-28 ENCOUNTER — HOSPITAL ENCOUNTER (EMERGENCY)
Age: 64
Discharge: HOME | End: 2019-08-28
Attending: FAMILY MEDICINE
Payer: MEDICARE

## 2019-08-28 VITALS
WEIGHT: 215 LBS | HEART RATE: 80 BPM | HEIGHT: 72 IN | DIASTOLIC BLOOD PRESSURE: 91 MMHG | SYSTOLIC BLOOD PRESSURE: 146 MMHG | OXYGEN SATURATION: 94 % | RESPIRATION RATE: 14 BRPM | BODY MASS INDEX: 29.12 KG/M2 | TEMPERATURE: 98 F

## 2019-08-28 DIAGNOSIS — I10 HYPERTENSION, UNSPECIFIED TYPE: Primary | ICD-10-CM

## 2019-08-28 DIAGNOSIS — I10 ASYMPTOMATIC HYPERTENSION: ICD-10-CM

## 2019-08-28 LAB
ALBUMIN SERPL-MCNC: 4.5 G/DL (ref 3.5–5.2)
ALP BLD-CCNC: 20 U/L (ref 40–130)
ALT SERPL-CCNC: 23 U/L (ref 5–41)
ANION GAP SERPL CALCULATED.3IONS-SCNC: 12 MMOL/L (ref 7–19)
AST SERPL-CCNC: 25 U/L (ref 5–40)
BASOPHILS ABSOLUTE: 0 K/UL (ref 0–0.2)
BASOPHILS RELATIVE PERCENT: 0.3 % (ref 0–1)
BILIRUB SERPL-MCNC: 0.4 MG/DL (ref 0.2–1.2)
BUN BLDV-MCNC: 29 MG/DL (ref 8–23)
CALCIUM SERPL-MCNC: 9.6 MG/DL (ref 8.8–10.2)
CHLORIDE BLD-SCNC: 100 MMOL/L (ref 98–111)
CO2: 23 MMOL/L (ref 22–29)
CREAT SERPL-MCNC: 1.2 MG/DL (ref 0.5–1.2)
EKG P AXIS: 51 DEGREES
EKG P-R INTERVAL: 194 MS
EKG Q-T INTERVAL: 364 MS
EKG QRS DURATION: 76 MS
EKG QTC CALCULATION (BAZETT): 376 MS
EKG T AXIS: -1 DEGREES
EOSINOPHILS ABSOLUTE: 0 K/UL (ref 0–0.6)
EOSINOPHILS RELATIVE PERCENT: 0.3 % (ref 0–5)
GFR NON-AFRICAN AMERICAN: >60
GLUCOSE BLD-MCNC: 108 MG/DL (ref 74–109)
HCT VFR BLD CALC: 43 % (ref 42–52)
HEMOGLOBIN: 14.2 G/DL (ref 14–18)
IMMATURE GRANULOCYTES #: 0 K/UL
LYMPHOCYTES ABSOLUTE: 0.9 K/UL (ref 1.1–4.5)
LYMPHOCYTES RELATIVE PERCENT: 13.5 % (ref 20–40)
MCH RBC QN AUTO: 30.8 PG (ref 27–31)
MCHC RBC AUTO-ENTMCNC: 33 G/DL (ref 33–37)
MCV RBC AUTO: 93.3 FL (ref 80–94)
MONOCYTES ABSOLUTE: 0.5 K/UL (ref 0–0.9)
MONOCYTES RELATIVE PERCENT: 7.2 % (ref 0–10)
NEUTROPHILS ABSOLUTE: 5.3 K/UL (ref 1.5–7.5)
NEUTROPHILS RELATIVE PERCENT: 78.3 % (ref 50–65)
PDW BLD-RTO: 12.4 % (ref 11.5–14.5)
PLATELET # BLD: 218 K/UL (ref 130–400)
PMV BLD AUTO: 10.1 FL (ref 9.4–12.4)
POTASSIUM REFLEX MAGNESIUM: 4.3 MMOL/L (ref 3.5–5)
RBC # BLD: 4.61 M/UL (ref 4.7–6.1)
SODIUM BLD-SCNC: 135 MMOL/L (ref 136–145)
TOTAL PROTEIN: 7.2 G/DL (ref 6.6–8.7)
WBC # BLD: 6.8 K/UL (ref 4.8–10.8)

## 2019-08-28 PROCEDURE — 85025 COMPLETE CBC W/AUTO DIFF WBC: CPT

## 2019-08-28 PROCEDURE — 6370000000 HC RX 637 (ALT 250 FOR IP): Performed by: FAMILY MEDICINE

## 2019-08-28 PROCEDURE — 93005 ELECTROCARDIOGRAM TRACING: CPT

## 2019-08-28 PROCEDURE — 99283 EMERGENCY DEPT VISIT LOW MDM: CPT

## 2019-08-28 PROCEDURE — 80053 COMPREHEN METABOLIC PANEL: CPT

## 2019-08-28 PROCEDURE — 36415 COLL VENOUS BLD VENIPUNCTURE: CPT

## 2019-08-28 RX ORDER — ALPRAZOLAM 0.25 MG/1
0.25 TABLET ORAL ONCE
Status: COMPLETED | OUTPATIENT
Start: 2019-08-28 | End: 2019-08-28

## 2019-08-28 RX ORDER — AMLODIPINE BESYLATE 5 MG/1
5 TABLET ORAL DAILY
Status: DISCONTINUED | OUTPATIENT
Start: 2019-08-28 | End: 2019-08-28 | Stop reason: HOSPADM

## 2019-08-28 RX ADMIN — AMLODIPINE BESYLATE 5 MG: 5 TABLET ORAL at 13:31

## 2019-08-28 RX ADMIN — ALPRAZOLAM 0.25 MG: 0.25 TABLET ORAL at 15:05

## 2019-08-28 RX ADMIN — AMLODIPINE BESYLATE 5 MG: 5 TABLET ORAL at 15:05

## 2019-08-28 ASSESSMENT — ENCOUNTER SYMPTOMS
SHORTNESS OF BREATH: 0
DIARRHEA: 0
ABDOMINAL PAIN: 0
BACK PAIN: 0
TROUBLE SWALLOWING: 0
WHEEZING: 0
COUGH: 0
VOMITING: 0
ABDOMINAL DISTENTION: 0
CHEST TIGHTNESS: 0
CONSTIPATION: 0
APNEA: 0
SORE THROAT: 0

## 2019-08-30 ENCOUNTER — OFFICE VISIT (OUTPATIENT)
Dept: PRIMARY CARE CLINIC | Age: 64
End: 2019-08-30
Payer: MEDICARE

## 2019-08-30 VITALS
TEMPERATURE: 97.6 F | SYSTOLIC BLOOD PRESSURE: 119 MMHG | BODY MASS INDEX: 29.39 KG/M2 | DIASTOLIC BLOOD PRESSURE: 80 MMHG | HEART RATE: 78 BPM | HEIGHT: 72 IN | WEIGHT: 217 LBS | OXYGEN SATURATION: 98 %

## 2019-08-30 DIAGNOSIS — R79.9 ABNORMAL FINDING OF BLOOD CHEMISTRY: ICD-10-CM

## 2019-08-30 DIAGNOSIS — M94.9 DISORDER OF CARTILAGE: ICD-10-CM

## 2019-08-30 DIAGNOSIS — G47.30 SLEEP APNEA, UNSPECIFIED TYPE: ICD-10-CM

## 2019-08-30 DIAGNOSIS — R06.83 SNORING: Primary | ICD-10-CM

## 2019-08-30 DIAGNOSIS — M19.90 ARTHRITIS: ICD-10-CM

## 2019-08-30 DIAGNOSIS — Z76.0 MEDICATION REFILL: ICD-10-CM

## 2019-08-30 DIAGNOSIS — Z11.59 NEED FOR HEPATITIS C SCREENING TEST: ICD-10-CM

## 2019-08-30 DIAGNOSIS — R53.83 OTHER FATIGUE: ICD-10-CM

## 2019-08-30 LAB
ALBUMIN SERPL-MCNC: 4.6 G/DL (ref 3.5–5.2)
ALP BLD-CCNC: 20 U/L (ref 40–130)
ALT SERPL-CCNC: 22 U/L (ref 5–41)
ANION GAP SERPL CALCULATED.3IONS-SCNC: 13 MMOL/L (ref 7–19)
AST SERPL-CCNC: 24 U/L (ref 5–40)
BASOPHILS ABSOLUTE: 0 K/UL (ref 0–0.2)
BASOPHILS RELATIVE PERCENT: 0.5 % (ref 0–1)
BILIRUB SERPL-MCNC: 0.4 MG/DL (ref 0.2–1.2)
BILIRUBIN URINE: NEGATIVE
BLOOD, URINE: NEGATIVE
BUN BLDV-MCNC: 33 MG/DL (ref 8–23)
CALCIUM SERPL-MCNC: 9.7 MG/DL (ref 8.8–10.2)
CHLORIDE BLD-SCNC: 104 MMOL/L (ref 98–111)
CHOLESTEROL, TOTAL: 183 MG/DL (ref 160–199)
CLARITY: CLEAR
CO2: 22 MMOL/L (ref 22–29)
COLOR: YELLOW
CREAT SERPL-MCNC: 1.3 MG/DL (ref 0.5–1.2)
EOSINOPHILS ABSOLUTE: 0.1 K/UL (ref 0–0.6)
EOSINOPHILS RELATIVE PERCENT: 0.8 % (ref 0–5)
GFR NON-AFRICAN AMERICAN: 55
GLUCOSE BLD-MCNC: 90 MG/DL (ref 74–109)
GLUCOSE URINE: NEGATIVE MG/DL
HBA1C MFR BLD: 5.9 % (ref 4–6)
HCT VFR BLD CALC: 43.2 % (ref 42–52)
HDLC SERPL-MCNC: 28 MG/DL (ref 55–121)
HEMOGLOBIN: 14.6 G/DL (ref 14–18)
HEPATITIS C ANTIBODY INTERPRETATION: NORMAL
IMMATURE GRANULOCYTES #: 0 K/UL
KETONES, URINE: NEGATIVE MG/DL
LDL CHOLESTEROL CALCULATED: 99 MG/DL
LEUKOCYTE ESTERASE, URINE: NEGATIVE
LYMPHOCYTES ABSOLUTE: 1.2 K/UL (ref 1.1–4.5)
LYMPHOCYTES RELATIVE PERCENT: 20.3 % (ref 20–40)
MCH RBC QN AUTO: 31.5 PG (ref 27–31)
MCHC RBC AUTO-ENTMCNC: 33.8 G/DL (ref 33–37)
MCV RBC AUTO: 93.3 FL (ref 80–94)
MONOCYTES ABSOLUTE: 0.6 K/UL (ref 0–0.9)
MONOCYTES RELATIVE PERCENT: 10 % (ref 0–10)
NEUTROPHILS ABSOLUTE: 4.2 K/UL (ref 1.5–7.5)
NEUTROPHILS RELATIVE PERCENT: 67.7 % (ref 50–65)
NITRITE, URINE: NEGATIVE
PDW BLD-RTO: 12.3 % (ref 11.5–14.5)
PH UA: 7 (ref 5–8)
PLATELET # BLD: 247 K/UL (ref 130–400)
PMV BLD AUTO: 10.2 FL (ref 9.4–12.4)
POTASSIUM SERPL-SCNC: 5.1 MMOL/L (ref 3.5–5)
PROTEIN UA: NEGATIVE MG/DL
RBC # BLD: 4.63 M/UL (ref 4.7–6.1)
SODIUM BLD-SCNC: 139 MMOL/L (ref 136–145)
SPECIFIC GRAVITY UA: 1.02 (ref 1–1.03)
T4 FREE: 1.1 NG/DL (ref 0.9–1.7)
TOTAL PROTEIN: 7 G/DL (ref 6.6–8.7)
TRIGL SERPL-MCNC: 281 MG/DL (ref 0–149)
TSH SERPL DL<=0.05 MIU/L-ACNC: 1.3 UIU/ML (ref 0.27–4.2)
UROBILINOGEN, URINE: 0.2 E.U./DL
VITAMIN B-12: 420 PG/ML (ref 211–946)
VITAMIN D 25-HYDROXY: 45.8 NG/ML
WBC # BLD: 6.1 K/UL (ref 4.8–10.8)

## 2019-08-30 PROCEDURE — 99214 OFFICE O/P EST MOD 30 MIN: CPT | Performed by: NURSE PRACTITIONER

## 2019-08-30 PROCEDURE — 1123F ACP DISCUSS/DSCN MKR DOCD: CPT | Performed by: NURSE PRACTITIONER

## 2019-08-30 PROCEDURE — G8419 CALC BMI OUT NRM PARAM NOF/U: HCPCS | Performed by: NURSE PRACTITIONER

## 2019-08-30 PROCEDURE — G8427 DOCREV CUR MEDS BY ELIG CLIN: HCPCS | Performed by: NURSE PRACTITIONER

## 2019-08-30 PROCEDURE — 3017F COLORECTAL CA SCREEN DOC REV: CPT | Performed by: NURSE PRACTITIONER

## 2019-08-30 PROCEDURE — 4040F PNEUMOC VAC/ADMIN/RCVD: CPT | Performed by: NURSE PRACTITIONER

## 2019-08-30 PROCEDURE — 1036F TOBACCO NON-USER: CPT | Performed by: NURSE PRACTITIONER

## 2019-08-30 RX ORDER — FENOFIBRATE 160 MG/1
TABLET ORAL
Qty: 90 TABLET | Refills: 3 | Status: SHIPPED | OUTPATIENT
Start: 2019-08-30 | End: 2020-07-15

## 2019-08-30 RX ORDER — ATORVASTATIN CALCIUM 20 MG/1
TABLET, FILM COATED ORAL
Qty: 90 TABLET | Refills: 3 | Status: SHIPPED | OUTPATIENT
Start: 2019-08-30 | End: 2020-07-15

## 2019-08-30 RX ORDER — IRBESARTAN 150 MG/1
TABLET ORAL
Qty: 90 TABLET | Refills: 3 | Status: SHIPPED | OUTPATIENT
Start: 2019-08-30 | End: 2019-08-30 | Stop reason: SDUPTHER

## 2019-08-30 RX ORDER — FENOFIBRATE 160 MG/1
TABLET ORAL
Qty: 90 TABLET | Refills: 3 | Status: SHIPPED | OUTPATIENT
Start: 2019-08-30 | End: 2019-08-30 | Stop reason: SDUPTHER

## 2019-08-30 RX ORDER — ATORVASTATIN CALCIUM 20 MG/1
TABLET, FILM COATED ORAL
Qty: 90 TABLET | Refills: 3 | Status: SHIPPED | OUTPATIENT
Start: 2019-08-30 | End: 2019-08-30 | Stop reason: SDUPTHER

## 2019-08-30 RX ORDER — IRBESARTAN 150 MG/1
TABLET ORAL
Qty: 60 TABLET | Refills: 1 | Status: SHIPPED | OUTPATIENT
Start: 2019-08-30 | End: 2019-09-04 | Stop reason: SDUPTHER

## 2019-08-30 NOTE — PROGRESS NOTES
[facility]  [address] [place] [address]  [id number]  [place] [address]  Dept: [phone]  Dept Fax: [phone]  Loc: [phone]        Sandra Pacheco is a 70 y.o. male who presents today for his medical conditions/ complaints as noted below. Sandra Pacheco is c/o Follow-up (from ER) and Hypertension        Chief Complaint   Patient presents with    Follow-up     from ER    Hypertension       HPI:     HPI    HTN: pt here s/p ER visit for elevated HTN. Pt states that his blood pressure had been running in the 162/108, and pulse 132. Pt blood pressure has come down since ER visit. States ER physician discussed with patient increasing his Avapro to half tablet in the morning and 1 full at bedtime. Patient if he is comfortable with this plan and he is he would like to move forward. Is due for fasting labs. Snoring: States that he does snore. Patient states that is chronically fatigued as well. He is interested in home sleep study. Patient reports that they have been compliant with taking medications as directed.      Past Medical History:   Diagnosis Date    Arthritis     OSTEO    CKD (chronic kidney disease) stage 3, GFR 30-59 ml/min ([facility]) 8/10/2017    Hyperglycemia 8/10/2017    Hyperlipidemia     Hypertension     Mixed hyperlipidemia 8/10/2017    Prostate carcinoma ([facility] [address] 8/10/2017       Past Surgical History:   Procedure Laterality Date    KNEE SURGERY Left     knee ligamentous reconstruction with residual degenerative arthritis    PARATHYROIDECTOMY Left 09/16/2016    parathyroid resection single tumor removal left inferior/adenoma with associated hyperparathyroidism     PROSTATECTOMY  2012    TONSILLECTOMY      TOTAL KNEE ARTHROPLASTY Left 5/17/2017    KNEE TOTAL ARTHROPLASTY performed by Harvinder Galarza MD at [facility] Marital status:      Spouse name: Damari Fernández Number of children: 2    Years of speech difficulty and light-headedness. Psychiatric/Behavioral: Negative for agitation, confusion, self-injury and suicidal ideas. The patient is not nervous/anxious. Objective:     Physical Exam   Constitutional: He is oriented to person, place, and time. He appears well-developed and well-nourished. Non-toxic appearance. No distress. HENT:   Head: Normocephalic and atraumatic. Not macrocephalic and not microcephalic. Right Ear: External ear normal. No drainage. No decreased hearing is noted. Left Ear: External ear normal. No drainage. No decreased hearing is noted. Nose: Nose normal. No rhinorrhea or nasal deformity. Mouth/Throat: Uvula is midline, oropharynx is clear and moist and mucous membranes are normal. Mucous membranes are not pale and not dry. Eyes: Pupils are equal, round, and reactive to light. Conjunctivae, EOM and lids are normal. Right eye exhibits no discharge. Left eye exhibits no discharge. No scleral icterus. Neck: Normal range of motion and phonation normal. Neck supple. No tracheal deviation present. No thyromegaly present. Cardiovascular: Normal rate, regular rhythm, S1 normal, S2 normal and normal heart sounds. No extrasystoles are present. No murmur heard. Pulmonary/Chest: Effort normal and breath sounds normal. No respiratory distress. He has no wheezes. He has no rhonchi. He has no rales. Abdominal: Soft. Bowel sounds are normal. He exhibits no distension. There is no tenderness. There is no guarding. Musculoskeletal: Normal range of motion. He exhibits no edema or tenderness. Cervical back: Normal. He exhibits no tenderness and no bony tenderness. Thoracic back: Normal. He exhibits no tenderness and no bony tenderness. Lumbar back: Normal. He exhibits no tenderness and no bony tenderness. Right lower leg: He exhibits no swelling and no edema. Left lower leg: He exhibits no swelling and no edema.    Lymphadenopathy: unspecified type       Return in about 1 month (around 9/30/2019).   Orders Placed This Encounter   Procedures    CBC Auto Differential     Standing Status:   Future     Number of Occurrences:   1     Standing Expiration Date:   10/3/2020    Comprehensive Metabolic Panel     Standing Status:   Future     Number of Occurrences:   1     Standing Expiration Date:   10/3/2020    Hemoglobin A1C     Standing Status:   Future     Number of Occurrences:   1     Standing Expiration Date:   10/3/2020    Lipid Panel     Standing Status:   Future     Number of Occurrences:   1     Standing Expiration Date:   10/3/2020     Order Specific Question:   Is Patient Fasting?/# of Hours     Answer:   fasting    TSH without Reflex     Standing Status:   Future     Number of Occurrences:   1     Standing Expiration Date:   10/3/2020    Urinalysis     Standing Status:   Future     Number of Occurrences:   1     Standing Expiration Date:   10/3/2020    T4, Free     Standing Status:   Future     Number of Occurrences:   1     Standing Expiration Date:   10/3/2020    Vitamin B12     Standing Status:   Future     Number of Occurrences:   1     Standing Expiration Date:   10/3/2020    Vitamin D 25 Hydroxy     Standing Status:   Future     Number of Occurrences:   1     Standing Expiration Date:   8/30/2020    Home Sleep Study     Standing Status:   Future     Standing Expiration Date:   9/30/2019     Order Specific Question:   Location For Sleep Study     Answer:   [facility]     Order Specific Question:   Select Sleep Lab Location     Answer:   [facility]     Comments:   [facility]     Orders Placed This Encounter   Medications    DISCONTD: irbesartan (AVAPRO) 150 MG tablet     Sig: TAKE 1 TABLET NIGHTLY     Dispense:  90 tablet     Refill:  3    DISCONTD: fenofibrate 160 MG tablet     Sig: TAKE 1 TABLET DAILY     Dispense:  90 tablet     Refill:  3    DISCONTD: atorvastatin (LIPITOR) 20 MG tablet     Sig: TAKE 1 TABLET EVERY EVENING

## 2019-08-30 NOTE — PATIENT INSTRUCTIONS
Increase avapro to 1/2 tablet in the morning and 1 full at bedtime. Have labs on the 2nd floor. Continue to monitor blood pressure and pulse. Follow up in 1 month to discuss blood pressure and labs. We will contact [facility] about home sleep study.

## 2019-08-31 ENCOUNTER — APPOINTMENT (OUTPATIENT)
Dept: CT IMAGING | Age: 64
End: 2019-08-31
Payer: MEDICARE

## 2019-08-31 ENCOUNTER — APPOINTMENT (OUTPATIENT)
Dept: GENERAL RADIOLOGY | Age: 64
End: 2019-08-31
Payer: MEDICARE

## 2019-08-31 ENCOUNTER — HOSPITAL ENCOUNTER (OUTPATIENT)
Age: 64
Setting detail: OBSERVATION
Discharge: HOME | End: 2019-09-01
Attending: EMERGENCY MEDICINE | Admitting: INTERNAL MEDICINE
Payer: MEDICARE

## 2019-08-31 DIAGNOSIS — R07.9 CHEST PAIN, UNSPECIFIED TYPE: Primary | ICD-10-CM

## 2019-08-31 PROBLEM — N28.9 RENAL LESION: Status: ACTIVE | Noted: 2019-08-31

## 2019-08-31 PROBLEM — R91.1 PULMONARY NODULE: Status: ACTIVE | Noted: 2019-08-31

## 2019-08-31 LAB
ANION GAP SERPL CALCULATED.3IONS-SCNC: 17 MMOL/L (ref 7–19)
APTT: 25.7 SEC (ref 26–36.2)
BUN BLDV-MCNC: 31 MG/DL (ref 8–23)
CALCIUM SERPL-MCNC: 9.5 MG/DL (ref 8.8–10.2)
CHLORIDE BLD-SCNC: 103 MMOL/L (ref 98–111)
CO2: 18 MMOL/L (ref 22–29)
CREAT SERPL-MCNC: 1.3 MG/DL (ref 0.5–1.2)
D DIMER: 0.52 UG/ML FEU (ref 0–0.48)
GFR NON-AFRICAN AMERICAN: 55
GLUCOSE BLD-MCNC: 123 MG/DL (ref 74–109)
HBA1C MFR BLD: 5.7 % (ref 4–6)
HCT VFR BLD CALC: 46.9 % (ref 42–52)
HEMOGLOBIN: 15.7 G/DL (ref 14–18)
INR BLD: 1.11 (ref 0.88–1.18)
MCH RBC QN AUTO: 31.4 PG (ref 27–31)
MCHC RBC AUTO-ENTMCNC: 33.5 G/DL (ref 33–37)
MCV RBC AUTO: 93.8 FL (ref 80–94)
PDW BLD-RTO: 13.1 % (ref 11.5–14.5)
PLATELET # BLD: 251 K/UL (ref 130–400)
PMV BLD AUTO: 10.5 FL (ref 9.4–12.4)
POTASSIUM REFLEX MAGNESIUM: 4 MMOL/L (ref 3.5–5)
PROTHROMBIN TIME: 13.7 SEC (ref 12–14.6)
RBC # BLD: 5 M/UL (ref 4.7–6.1)
SODIUM BLD-SCNC: 138 MMOL/L (ref 136–145)
TROPONIN: <0.01 NG/ML (ref 0–0.03)
WBC # BLD: 6.2 K/UL (ref 4.8–10.8)

## 2019-08-31 PROCEDURE — 85610 PROTHROMBIN TIME: CPT

## 2019-08-31 PROCEDURE — 6370000000 HC RX 637 (ALT 250 FOR IP): Performed by: EMERGENCY MEDICINE

## 2019-08-31 PROCEDURE — 85730 THROMBOPLASTIN TIME PARTIAL: CPT

## 2019-08-31 PROCEDURE — G0378 HOSPITAL OBSERVATION PER HR: HCPCS

## 2019-08-31 PROCEDURE — 71275 CT ANGIOGRAPHY CHEST: CPT

## 2019-08-31 PROCEDURE — 71045 X-RAY EXAM CHEST 1 VIEW: CPT

## 2019-08-31 PROCEDURE — 96374 THER/PROPH/DIAG INJ IV PUSH: CPT

## 2019-08-31 PROCEDURE — 83036 HEMOGLOBIN GLYCOSYLATED A1C: CPT

## 2019-08-31 PROCEDURE — 85379 FIBRIN DEGRADATION QUANT: CPT

## 2019-08-31 PROCEDURE — 2580000003 HC RX 258: Performed by: INTERNAL MEDICINE

## 2019-08-31 PROCEDURE — 85027 COMPLETE CBC AUTOMATED: CPT

## 2019-08-31 PROCEDURE — 6370000000 HC RX 637 (ALT 250 FOR IP): Performed by: INTERNAL MEDICINE

## 2019-08-31 PROCEDURE — 96372 THER/PROPH/DIAG INJ SC/IM: CPT

## 2019-08-31 PROCEDURE — 80048 BASIC METABOLIC PNL TOTAL CA: CPT

## 2019-08-31 PROCEDURE — 6360000004 HC RX CONTRAST MEDICATION: Performed by: EMERGENCY MEDICINE

## 2019-08-31 PROCEDURE — 93005 ELECTROCARDIOGRAM TRACING: CPT

## 2019-08-31 PROCEDURE — 99285 EMERGENCY DEPT VISIT HI MDM: CPT

## 2019-08-31 PROCEDURE — 6360000002 HC RX W HCPCS: Performed by: EMERGENCY MEDICINE

## 2019-08-31 PROCEDURE — 84484 ASSAY OF TROPONIN QUANT: CPT

## 2019-08-31 PROCEDURE — 36415 COLL VENOUS BLD VENIPUNCTURE: CPT

## 2019-08-31 PROCEDURE — 6360000002 HC RX W HCPCS: Performed by: INTERNAL MEDICINE

## 2019-08-31 RX ORDER — NITROGLYCERIN 0.4 MG/1
0.4 TABLET SUBLINGUAL EVERY 5 MIN PRN
Status: DISCONTINUED | OUTPATIENT
Start: 2019-08-31 | End: 2019-08-31

## 2019-08-31 RX ORDER — ACETAMINOPHEN 325 MG/1
650 TABLET ORAL EVERY 4 HOURS PRN
Status: DISCONTINUED | OUTPATIENT
Start: 2019-08-31 | End: 2019-09-01 | Stop reason: HOSPADM

## 2019-08-31 RX ORDER — NITROGLYCERIN 0.4 MG/1
0.4 TABLET SUBLINGUAL EVERY 5 MIN PRN
Status: DISCONTINUED | OUTPATIENT
Start: 2019-08-31 | End: 2019-09-01 | Stop reason: HOSPADM

## 2019-08-31 RX ORDER — ATORVASTATIN CALCIUM 20 MG/1
20 TABLET, FILM COATED ORAL NIGHTLY
Status: DISCONTINUED | OUTPATIENT
Start: 2019-08-31 | End: 2019-09-01 | Stop reason: HOSPADM

## 2019-08-31 RX ORDER — LOSARTAN POTASSIUM 25 MG/1
25 TABLET ORAL 2 TIMES DAILY
Status: DISCONTINUED | OUTPATIENT
Start: 2019-08-31 | End: 2019-08-31

## 2019-08-31 RX ORDER — ONDANSETRON 2 MG/ML
INJECTION INTRAMUSCULAR; INTRAVENOUS
Status: DISPENSED
Start: 2019-08-31 | End: 2019-09-01

## 2019-08-31 RX ORDER — ASPIRIN 81 MG/1
81 TABLET, CHEWABLE ORAL DAILY
Status: DISCONTINUED | OUTPATIENT
Start: 2019-09-01 | End: 2019-09-01 | Stop reason: HOSPADM

## 2019-08-31 RX ORDER — ONDANSETRON 2 MG/ML
4 INJECTION INTRAMUSCULAR; INTRAVENOUS ONCE
Status: COMPLETED | OUTPATIENT
Start: 2019-08-31 | End: 2019-08-31

## 2019-08-31 RX ORDER — HYDRALAZINE HYDROCHLORIDE 20 MG/ML
10 INJECTION INTRAMUSCULAR; INTRAVENOUS EVERY 6 HOURS PRN
Status: DISCONTINUED | OUTPATIENT
Start: 2019-08-31 | End: 2019-09-01 | Stop reason: HOSPADM

## 2019-08-31 RX ORDER — SODIUM CHLORIDE 0.9 % (FLUSH) 0.9 %
10 SYRINGE (ML) INJECTION EVERY 12 HOURS SCHEDULED
Status: DISCONTINUED | OUTPATIENT
Start: 2019-08-31 | End: 2019-09-01

## 2019-08-31 RX ORDER — NITROGLYCERIN 0.4 MG/1
TABLET SUBLINGUAL
Status: DISCONTINUED
Start: 2019-08-31 | End: 2019-08-31

## 2019-08-31 RX ORDER — FENOFIBRATE 160 MG/1
160 TABLET ORAL DAILY
Status: DISCONTINUED | OUTPATIENT
Start: 2019-09-01 | End: 2019-09-01 | Stop reason: HOSPADM

## 2019-08-31 RX ORDER — M-VIT,TX,IRON,MINS/CALC/FOLIC 27MG-0.4MG
1 TABLET ORAL DAILY
Status: DISCONTINUED | OUTPATIENT
Start: 2019-09-01 | End: 2019-09-01 | Stop reason: HOSPADM

## 2019-08-31 RX ORDER — LOSARTAN POTASSIUM 25 MG/1
25 TABLET ORAL 2 TIMES DAILY
Status: DISCONTINUED | OUTPATIENT
Start: 2019-09-01 | End: 2019-09-01 | Stop reason: HOSPADM

## 2019-08-31 RX ORDER — ONDANSETRON 2 MG/ML
4 INJECTION INTRAMUSCULAR; INTRAVENOUS EVERY 6 HOURS PRN
Status: DISCONTINUED | OUTPATIENT
Start: 2019-08-31 | End: 2019-09-01 | Stop reason: HOSPADM

## 2019-08-31 RX ORDER — POLYETHYLENE GLYCOL 3350 17 G/17G
17 POWDER, FOR SOLUTION ORAL DAILY PRN
Status: DISCONTINUED | OUTPATIENT
Start: 2019-08-31 | End: 2019-09-01 | Stop reason: HOSPADM

## 2019-08-31 RX ORDER — ASCORBIC ACID 500 MG
1000 TABLET ORAL DAILY
Status: DISCONTINUED | OUTPATIENT
Start: 2019-09-01 | End: 2019-09-01 | Stop reason: HOSPADM

## 2019-08-31 RX ORDER — SODIUM CHLORIDE 0.9 % (FLUSH) 0.9 %
10 SYRINGE (ML) INJECTION PRN
Status: DISCONTINUED | OUTPATIENT
Start: 2019-08-31 | End: 2019-09-01

## 2019-08-31 RX ADMIN — ATORVASTATIN CALCIUM 20 MG: 20 TABLET, FILM COATED ORAL at 22:01

## 2019-08-31 RX ADMIN — METOPROLOL TARTRATE 25 MG: 25 TABLET ORAL at 22:01

## 2019-08-31 RX ADMIN — ONDANSETRON 4 MG: 2 INJECTION INTRAMUSCULAR; INTRAVENOUS at 12:37

## 2019-08-31 RX ADMIN — ENOXAPARIN SODIUM 40 MG: 40 INJECTION SUBCUTANEOUS at 22:01

## 2019-08-31 RX ADMIN — IOPAMIDOL 90 ML: 755 INJECTION, SOLUTION INTRAVENOUS at 14:36

## 2019-08-31 RX ADMIN — Medication 10 ML: at 22:01

## 2019-08-31 RX ADMIN — NITROGLYCERIN 0.4 MG: 0.4 TABLET, ORALLY DISINTEGRATING SUBLINGUAL at 12:37

## 2019-08-31 ASSESSMENT — PAIN SCALES - GENERAL
PAINLEVEL_OUTOF10: 5
PAINLEVEL_OUTOF10: 0
PAINLEVEL_OUTOF10: 0
PAINLEVEL_OUTOF10: 9

## 2019-08-31 ASSESSMENT — ENCOUNTER SYMPTOMS
NAUSEA: 0
DIARRHEA: 0
VOMITING: 0
ABDOMINAL PAIN: 0
SHORTNESS OF BREATH: 1

## 2019-08-31 ASSESSMENT — PAIN DESCRIPTION - FREQUENCY: FREQUENCY: INTERMITTENT

## 2019-08-31 ASSESSMENT — PAIN DESCRIPTION - LOCATION
LOCATION: CHEST
LOCATION: CHEST

## 2019-08-31 NOTE — H&P
Shari Londono MD at [address] [place] [address] Medications:  Prior to Admission medications    Medication Sig Start Date End Date Taking? Authorizing Provider   irbesartan (AVAPRO) 150 MG tablet 1/2 tablet in the morning and 1 full at beditme. 8/30/19  Yes ZAY Meyer   fenofibrate 160 MG tablet TAKE 1 TABLET DAILY 8/30/19  Yes ZAY Meyer   atorvastatin (LIPITOR) 20 MG tablet TAKE 1 TABLET EVERY EVENING 8/30/19  Yes ZAY Meyer   Probiotic Product (PROBIOTIC ADVANCED PO) Take by mouth daily    Yes Historical Provider, MD   Ascorbic Acid (VITAMIN C) 1000 MG tablet Take 1,000 mg by mouth daily   Yes Historical Provider, MD   Multiple Vitamin (MULTIVITAMIN) capsule Take 1 capsule by mouth daily    Yes Historical Provider, MD   Omega-3 Fatty Acids (FISH OIL) 1000 MG CAPS Take 1,000 mg by mouth daily Indications: SUPPLEMENT   Yes Historical Provider, MD       Allergies:    Patient has no known allergies. Social History:    The patient currently lives at home  Tobacco:   reports that he quit smoking about 27 years ago. His smoking use included cigarettes. He has a 20.00 pack-year smoking history. He has never used smokeless tobacco.  Alcohol:   reports that he drinks about 2.0 standard drinks of alcohol per week. Illicit Drugs: denies    Family History:      Problem Relation Age of Onset    Cancer Mother 52        large cell nonkeratinizing squamous cell of the pancreas                          COPD Father     Cancer Father         Hodgkin's disease head, face, neck    Stroke Father         ischemic       Review of Systems:   As per HPI, rest of 14 point ROS reviewed and negative. Physical Examination:  BP (!) 121/92   Pulse 77   Temp 98.2 °F (36.8 °C) (Oral)   Resp 13   SpO2 96%   General appearance: alert, appears stated age, cooperative and no distress  Head: Normocephalic, without obvious abnormality, atraumatic  Eyes: conjunctivae/corneas clear.  PERRL, EOM's emphysematous changes. No pneumothorax. VASCULATURE: Contrast bolus is adequate. There are no pulmonary  arterial intraluminal filling defects. Normal pulmonary artery  caliber. Thoracic aorta is normal in course and caliber. Mild  calcified aortic atherosclerosis.    CARDIAC:  Cardiac size is normal.  No pericardial effusion. No gross  coronary calcifications. MEDIASTINUM: There is no mediastinal or hilar lymphadenopathy by CT  size criteria. Esophagus has normal coarse, caliber and wall  thickness. EXTRATHORACIC: The visualized portions of the thyroid gland are  unremarkable. No thoracic inlet or axillary adenopathy. The  extrathoracic soft tissues are within normal limits. INCLUDED UPPER ABDOMEN: The visualized portion of the liver,  gallbladder, pancreas, spleen, adrenal glands are within normal  limits. Right kidney with a 1.7 cm low-density lesion, incompletely  evaluated on this exam, most commonly a renal cyst.  OSSEOUS: No acute or suspicious bony finding.      Impression:       1. No evidence of pulmonary embolism. 2. Incidental right middle lobe 6 mm pulmonary nodule. Per Fleischner  criteria, if the patient is high risk (history of smoking, family  history of lung cancer, or other risk factors), recommend follow-up CT  in 12 months. If the patient is low risk, no additional follow-up is  recommended. Alternatively, if the patient has outside prior exams,  these could be used to determine chronicity. 3. Mild emphysematous changes. 4. Right kidney with 1.7 cm low-density lesion, incompletely evaluated  on this exam, most commonly a renal cyst. Recommend correlation with  outside priors. Signed by Dr Mau Bourne on 8/31/2019 3:11 PM     XR CHEST PORTABLE [[id number]] Resulted: 08/31/19 1309     Order Status: Completed Updated: 08/31/19 1311     Narrative:       EXAM: XR CHEST PORTABLE -- 8/31/2019 12:00 PM  HISTORY: 63 years, Male, chest tightness  COMPARISON: 4/18/2017  TECHNIQUE:  1 images.

## 2019-09-01 ENCOUNTER — APPOINTMENT (OUTPATIENT)
Dept: ULTRASOUND IMAGING | Age: 64
End: 2019-09-01
Payer: MEDICARE

## 2019-09-01 ENCOUNTER — APPOINTMENT (OUTPATIENT)
Dept: NUCLEAR MEDICINE | Age: 64
End: 2019-09-01
Payer: MEDICARE

## 2019-09-01 VITALS
WEIGHT: 210.2 LBS | SYSTOLIC BLOOD PRESSURE: 128 MMHG | TEMPERATURE: 97 F | HEART RATE: 65 BPM | RESPIRATION RATE: 16 BRPM | HEIGHT: 72 IN | BODY MASS INDEX: 28.47 KG/M2 | OXYGEN SATURATION: 97 % | DIASTOLIC BLOOD PRESSURE: 83 MMHG

## 2019-09-01 LAB
ANION GAP SERPL CALCULATED.3IONS-SCNC: 13 MMOL/L (ref 7–19)
BUN BLDV-MCNC: 30 MG/DL (ref 8–23)
CALCIUM SERPL-MCNC: 9 MG/DL (ref 8.8–10.2)
CHLORIDE BLD-SCNC: 103 MMOL/L (ref 98–111)
CHOLESTEROL, TOTAL: 178 MG/DL (ref 160–199)
CO2: 22 MMOL/L (ref 22–29)
CREAT SERPL-MCNC: 1.5 MG/DL (ref 0.5–1.2)
EKG P AXIS: 57 DEGREES
EKG P AXIS: 61 DEGREES
EKG P-R INTERVAL: 184 MS
EKG P-R INTERVAL: 200 MS
EKG Q-T INTERVAL: 340 MS
EKG Q-T INTERVAL: 412 MS
EKG QRS DURATION: 76 MS
EKG QRS DURATION: 82 MS
EKG QTC CALCULATION (BAZETT): 387 MS
EKG QTC CALCULATION (BAZETT): 405 MS
EKG T AXIS: 21 DEGREES
EKG T AXIS: 28 DEGREES
GFR NON-AFRICAN AMERICAN: 47
GLUCOSE BLD-MCNC: 99 MG/DL (ref 74–109)
HDLC SERPL-MCNC: 22 MG/DL (ref 55–121)
LDL CHOLESTEROL CALCULATED: 79 MG/DL
LV EF: 53 %
LVEF MODALITY: NORMAL
POTASSIUM REFLEX MAGNESIUM: 3.9 MMOL/L (ref 3.5–5)
SODIUM BLD-SCNC: 138 MMOL/L (ref 136–145)
TRIGL SERPL-MCNC: 387 MG/DL (ref 0–149)

## 2019-09-01 PROCEDURE — 80061 LIPID PANEL: CPT

## 2019-09-01 PROCEDURE — 78452 HT MUSCLE IMAGE SPECT MULT: CPT

## 2019-09-01 PROCEDURE — 99205 OFFICE O/P NEW HI 60 MIN: CPT | Performed by: INTERNAL MEDICINE

## 2019-09-01 PROCEDURE — G0378 HOSPITAL OBSERVATION PER HR: HCPCS

## 2019-09-01 PROCEDURE — 36415 COLL VENOUS BLD VENIPUNCTURE: CPT

## 2019-09-01 PROCEDURE — 6370000000 HC RX 637 (ALT 250 FOR IP): Performed by: INTERNAL MEDICINE

## 2019-09-01 PROCEDURE — 80048 BASIC METABOLIC PNL TOTAL CA: CPT

## 2019-09-01 PROCEDURE — 76770 US EXAM ABDO BACK WALL COMP: CPT

## 2019-09-01 PROCEDURE — 93017 CV STRESS TEST TRACING ONLY: CPT

## 2019-09-01 PROCEDURE — 3430000000 HC RX DIAGNOSTIC RADIOPHARMACEUTICAL: Performed by: INTERNAL MEDICINE

## 2019-09-01 PROCEDURE — A9500 TC99M SESTAMIBI: HCPCS | Performed by: INTERNAL MEDICINE

## 2019-09-01 PROCEDURE — 93306 TTE W/DOPPLER COMPLETE: CPT

## 2019-09-01 PROCEDURE — 93005 ELECTROCARDIOGRAM TRACING: CPT

## 2019-09-01 PROCEDURE — 2580000003 HC RX 258: Performed by: INTERNAL MEDICINE

## 2019-09-01 RX ORDER — ASPIRIN 81 MG/1
81 TABLET, CHEWABLE ORAL DAILY
Qty: 30 TABLET | Refills: 3 | Status: SHIPPED | OUTPATIENT
Start: 2019-09-02 | End: 2020-09-08

## 2019-09-01 RX ORDER — SODIUM CHLORIDE 9 MG/ML
INJECTION, SOLUTION INTRAVENOUS CONTINUOUS
Status: DISCONTINUED | OUTPATIENT
Start: 2019-09-01 | End: 2019-09-01 | Stop reason: HOSPADM

## 2019-09-01 RX ORDER — NITROGLYCERIN 0.4 MG/1
TABLET SUBLINGUAL
Qty: 25 TABLET | Refills: 3 | Status: SHIPPED | OUTPATIENT
Start: 2019-09-01 | End: 2020-09-08

## 2019-09-01 RX ADMIN — LOSARTAN POTASSIUM 25 MG: 25 TABLET ORAL at 08:45

## 2019-09-01 RX ADMIN — OXYCODONE HYDROCHLORIDE AND ACETAMINOPHEN 1000 MG: 500 TABLET ORAL at 08:45

## 2019-09-01 RX ADMIN — TETRAKIS(2-METHOXYISOBUTYLISOCYANIDE)COPPER(I) TETRAFLUOROBORATE 10 MILLICURIE: 1 INJECTION, POWDER, LYOPHILIZED, FOR SOLUTION INTRAVENOUS at 15:40

## 2019-09-01 RX ADMIN — Medication 10 ML: at 08:45

## 2019-09-01 RX ADMIN — TETRAKIS(2-METHOXYISOBUTYLISOCYANIDE)COPPER(I) TETRAFLUOROBORATE 30 MILLICURIE: 1 INJECTION, POWDER, LYOPHILIZED, FOR SOLUTION INTRAVENOUS at 15:40

## 2019-09-01 RX ADMIN — FENOFIBRATE 160 MG: 160 TABLET ORAL at 08:45

## 2019-09-01 RX ADMIN — SODIUM CHLORIDE: 9 INJECTION, SOLUTION INTRAVENOUS at 12:19

## 2019-09-01 RX ADMIN — MULTIPLE VITAMINS W/ MINERALS TAB 1 TABLET: TAB at 08:45

## 2019-09-01 RX ADMIN — ASPIRIN 81 MG 81 MG: 81 TABLET ORAL at 08:45

## 2019-09-01 ASSESSMENT — ENCOUNTER SYMPTOMS
SHORTNESS OF BREATH: 0
RESPIRATORY NEGATIVE: 1
GASTROINTESTINAL NEGATIVE: 1
VOMITING: 0
EYES NEGATIVE: 1
DIARRHEA: 0
NAUSEA: 0

## 2019-09-01 NOTE — DISCHARGE SUMMARY
12 mm in size. This could represent a pyelocalyceal diverticulum. This area was not included on the recent CT exam as it involves the lower pole. Scanning through the pelvis reveals the bladder to be moderately distended with anechoic urine. The wall thickness and contour are normal. There is no surrounding ascites. 1. The lesion noted involving the mid to upper pole of the right kidney is noted to represent a benign cyst by ultrasound. 2. There is what appears to be cystic lesion containing a calcification involving the lower pole of the left kidney. This could represent a pyelocalyceal diverticulum but follow-up with CT urography would be recommended to assure that this does not represent a partially cystic neoplasm with associated calcification. I would suggest CT imaging through the kidneys with and without contrast including arterial and venous phase imaging of the kidneys as well as delayed images through the abdomen and pelvis. . Signed by Dr Addy Briceno on 9/1/2019 1:02 PM    Xr Chest Portable    Result Date: 8/31/2019  EXAM: XR CHEST PORTABLE -- 8/31/2019 12:00 PM HISTORY: 63 years, Male, chest tightness COMPARISON: 4/18/2017 TECHNIQUE:  1 images. Frontal view of the chest. FINDINGS:  No pneumothorax, pleural effusion or focal consolidation. Normal cardiac and mediastinal contours. No acute findings of the bones, soft tissues or upper abdomen. 1. No acute cardiopulmonary finding. Signed by Dr Estella Reina on 8/31/2019 1:09 PM    Cta Pulmonary W Contrast    Result Date: 8/31/2019  EXAM: CTA PULMONARY W CONTRAST -- 8/31/2019 1:15 PM HISTORY: 63 years, Male, chest pain, chest pressure, shortness of breath COMPARISON: Same day chest radiograph DLP: 819 mGy cm. Automated exposure control was utilized to minimize patient radiation dose. TECHNIQUE: Enhanced axial CT images obtained. 3-D postprocessing, including MIPS, performed and images saved to PACS.  FINDINGS: AIRWAYS/PULMONARY PARENCHYMA: The 08/31/19  1316 09/01/19  0234    138 138   K 5.1* 4.0 3.9    103 103   CO2 22 18* 22   BUN 33* 31* 30*   CREATININE 1.3* 1.3* 1.5*   GLUCOSE 90 123* 99     INR:   Recent Labs     08/31/19  1316   INR 1.11     Lipids:   Recent Labs     09/01/19  0234   CHOL 178   HDL 22*     ABGs:No results for input(s): PHART, KQI3GPZ, PO2ART, ARN5YOE, BEART, HGBAE, N4SLBQHU, CARBOXHGBART, 02THERAPY in the last 72 hours. HgBA1c:    Recent Labs     08/30/19  1009 08/31/19  2159   LABA1C 5.9 5.7       Procedures: [facility] Course: The patient is a 72 y. o. male with history of hypertension, hyperlipidemia, mild/borderline CKD presenting in for chest pain.  Earlier on the week patient began to have issues with high blood pressure and was feeling not well in general but no specific symptoms. Nicolasa Gurabo to the ER and work-up was okay, blood pressure meds were adjusted and he was sent home.  Blood pressures continued to be elevated at times at home. Roddy Tanmayon he felt like his blood pressure was up and then he started having sternal chest pain radiating to his neck.  It was associated with dizziness, shortness of breath, diaphoresis.  It lasted for about 20 minutes and then went away. Shamar Hawkins is currently chest pain-free.  Has no other recent issues or events.  No fevers or chills.  No cough or sputum.  No abdominal pain, vomiting, diarrhea.  No focal weakness.  In the ER his EKG is nonacute and he has tropes x2 which are negative.  We are asked to admit for ongoing work-up and care given symptoms.     09/0: Patient remains chest pain-free. Plan for stress test today. Cardiology to evaluate, appreciate recommendations. Serial troponins have been negative. Patient blood pressure has been controlled. FRANTZ noted, mild. Patient states that he has had this before in the outpatient setting. Renal ultrasound is also been ordered, will follow results.     Addendum: Patient had a negative Lexiscan, was started on aspirin but
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
## 2019-09-03 ENCOUNTER — TELEPHONE (OUTPATIENT)
Dept: INTERNAL MEDICINE | Age: 64
End: 2019-09-03

## 2019-09-03 ASSESSMENT — ENCOUNTER SYMPTOMS
WHEEZING: 0
BLOOD IN STOOL: 0
SORE THROAT: 0
CHEST TIGHTNESS: 0
SHORTNESS OF BREATH: 0
DIARRHEA: 0
TROUBLE SWALLOWING: 0
EYE REDNESS: 0
CONSTIPATION: 0
COUGH: 0
VOICE CHANGE: 0
ABDOMINAL PAIN: 0
NAUSEA: 0
RHINORRHEA: 0
VOMITING: 0

## 2019-09-04 ENCOUNTER — OFFICE VISIT (OUTPATIENT)
Dept: PRIMARY CARE CLINIC | Age: 64
End: 2019-09-04
Payer: MEDICARE

## 2019-09-04 VITALS
BODY MASS INDEX: 29.26 KG/M2 | SYSTOLIC BLOOD PRESSURE: 117 MMHG | WEIGHT: 216 LBS | OXYGEN SATURATION: 97 % | TEMPERATURE: 97.6 F | HEART RATE: 80 BPM | HEIGHT: 72 IN | DIASTOLIC BLOOD PRESSURE: 79 MMHG

## 2019-09-04 DIAGNOSIS — N28.9 RENAL LESION: ICD-10-CM

## 2019-09-04 DIAGNOSIS — Z09 HOSPITAL DISCHARGE FOLLOW-UP: Primary | ICD-10-CM

## 2019-09-04 DIAGNOSIS — Z76.0 MEDICATION REFILL: ICD-10-CM

## 2019-09-04 DIAGNOSIS — I10 ESSENTIAL HYPERTENSION: ICD-10-CM

## 2019-09-04 DIAGNOSIS — Z87.891 HISTORY OF SMOKING: ICD-10-CM

## 2019-09-04 DIAGNOSIS — Z90.79 HISTORY OF PROSTATECTOMY: ICD-10-CM

## 2019-09-04 DIAGNOSIS — N20.0 LEFT NEPHROLITHIASIS: ICD-10-CM

## 2019-09-04 DIAGNOSIS — R91.1 PULMONARY NODULE: ICD-10-CM

## 2019-09-04 LAB
LV EF: 60 %
LVEF MODALITY: NORMAL

## 2019-09-04 PROCEDURE — 1111F DSCHRG MED/CURRENT MED MERGE: CPT | Performed by: NURSE PRACTITIONER

## 2019-09-04 PROCEDURE — 99495 TRANSJ CARE MGMT MOD F2F 14D: CPT | Performed by: NURSE PRACTITIONER

## 2019-09-04 RX ORDER — IRBESARTAN 150 MG/1
TABLET ORAL
Qty: 180 TABLET | Refills: 2 | Status: SHIPPED | OUTPATIENT
Start: 2019-09-04 | End: 2020-08-26

## 2019-09-04 RX ORDER — OMEGA-3-ACID ETHYL ESTERS 1 G/1
2 CAPSULE, LIQUID FILLED ORAL 2 TIMES DAILY
Qty: 120 CAPSULE | Refills: 1 | Status: SHIPPED | OUTPATIENT
Start: 2019-09-04 | End: 2019-10-04 | Stop reason: SDUPTHER

## 2019-09-04 NOTE — PROGRESS NOTES
Post-Discharge Transitional Care Management Services or Hospital Follow Up      [place] [facility]   YOB: 1955    Date of Office Visit:  9/4/2019  Date of Hospital Admission: 8/31/19  Date of Hospital Discharge: 9/1/19  Readmission Risk Score(high >=14%. Medium >=10%):No data recorded    Care management risk score Rising risk (score 2-5) and Complex Care (Scores >=6): 5     Non face to face  following discharge, date last encounter closed (first attempt may have been earlier): 9/3/2019  4:34 PM 9/3/2019  4:34 PM    Call initiated 2 business days of discharge: Yes     Patient Active Problem List   Diagnosis    CKD (chronic kidney disease) stage 3, GFR 30-59 ml/min ([address]    [facility] [address]    Mixed hyperlipidemia    Hyperglycemia    Hypertension    Actinic keratosis    Prostate cancer ([facility] [address]    Chest pain    Pulmonary nodule    Renal lesion   [facility] discharge follow-up    Medication refill       No Known Allergies    Medications listed as ordered at the time of discharge from hospital   Maia Fantz   Home Medication Instructions NIDIA:    Printed on:09/16/19 4871   Medication Information                      Ascorbic Acid (VITAMIN C) 1000 MG tablet  Take 1,000 mg by mouth daily             aspirin 81 MG chewable tablet  Take 1 tablet by mouth daily             atorvastatin (LIPITOR) 20 MG tablet  TAKE 1 TABLET EVERY EVENING             fenofibrate 160 MG tablet  TAKE 1 TABLET DAILY             irbesartan (AVAPRO) 150 MG tablet  1/2 tablet in the morning and 1 full at beditme. Multiple Vitamin (MULTIVITAMIN) capsule  Take 1 capsule by mouth daily              nitroGLYCERIN (NITROSTAT) 0.4 MG SL tablet  up to max of 3 total doses. If no relief after 1 dose, call 911.              omega-3 acid ethyl esters (LOVAZA) 1 g capsule  Take 2 capsules by mouth 2 times daily             Omega-3 Fatty Acids (FISH OIL) 1000 MG CAPS  Take 1,000 mg by mouth daily Temp: 97.6 °F (36.4 °C)   SpO2: 97%   Weight: 216 lb (98 kg)   Height: 6' (1.829 m)     Body mass index is 29.29 kg/m². Wt Readings from Last 3 Encounters:   09/04/19 216 lb (98 kg)   08/31/19 210 lb 3.2 oz (95.3 kg)   08/30/19 217 lb (98.4 kg)     BP Readings from Last 3 Encounters:   09/04/19 117/79   09/01/19 128/83   08/30/19 119/80       Physical Exam   Constitutional: He is oriented to person, place, and time. He appears well-developed and well-nourished. Non-toxic appearance. No distress. HENT:   Head: Normocephalic and atraumatic. Not macrocephalic and not microcephalic. Right Ear: External ear normal. No drainage. No decreased hearing is noted. Left Ear: External ear normal. No drainage. No decreased hearing is noted. Nose: Nose normal. No rhinorrhea or nasal deformity. Mouth/Throat: Uvula is midline, oropharynx is clear and moist and mucous membranes are normal. Mucous membranes are not pale and not dry. Eyes: Pupils are equal, round, and reactive to light. Conjunctivae, EOM and lids are normal. Right eye exhibits no discharge. Left eye exhibits no discharge. No scleral icterus. Neck: Normal range of motion and phonation normal. Neck supple. No tracheal deviation present. No thyromegaly present. Cardiovascular: Normal rate, regular rhythm, S1 normal, S2 normal and normal heart sounds. No extrasystoles are present. No murmur heard. Pulmonary/Chest: Effort normal and breath sounds normal. No respiratory distress. He has no wheezes. He has no rhonchi. He has no rales. Abdominal: Soft. Bowel sounds are normal. He exhibits no distension. There is no tenderness. There is no guarding. Musculoskeletal: Normal range of motion. He exhibits no edema or tenderness. Cervical back: Normal. He exhibits no tenderness and no bony tenderness. Thoracic back: Normal. He exhibits no tenderness and no bony tenderness.         Lumbar back: Normal. He exhibits no tenderness and no bony tenderness. Right lower leg: He exhibits no swelling and no edema. Left lower leg: He exhibits no swelling and no edema. Lymphadenopathy:        Head (right side): No submental, no submandibular and no tonsillar adenopathy present. Head (left side): No submental, no submandibular and no tonsillar adenopathy present. He has no cervical adenopathy. Right: No supraclavicular adenopathy present. Left: No supraclavicular adenopathy present. Neurological: He is alert and oriented to person, place, and time. He has normal reflexes. He displays no tremor. He displays no seizure activity. Gait normal.   Skin: Skin is dry. No rash (on extremities, head, neck, face) noted. He is not diaphoretic. No cyanosis or erythema (on head, neck, face, extremities). No pallor. Nails show no clubbing. Psychiatric: He has a normal mood and affect. His speech is normal and behavior is normal. Judgment and thought content normal. Cognition and memory are normal.   Nursing note and vitals reviewed. Vitals 9/4/2019 9/1/2019 0/0/8088   SYSTOLIC 195 766 112   DIASTOLIC 79 83 65   Pulse 80 65 56   Temp 97.6 97 97.4   Resp  16 16   SpO2 97 97 94   Weight 216 lb     Height 6' 0\"     BMI (wt*703/ht~2) 29.29 kg/m2       Assessment/Plan:  1. Hospital discharge follow-up    - [place] DISCHARGE MEDS RECONCILED W/ CURRENT OUTPATIENT MED LIST    2. Medication refill    - irbesartan (AVAPRO) 150 MG tablet; 1/2 tablet in the morning and 1 full at beditme. Dispense: 180 tablet; Refill: 2    3. Essential hypertension    - irbesartan (AVAPRO) 150 MG tablet; 1/2 tablet in the morning and 1 full at beditme. Dispense: 180 tablet; Refill: 2    4. Left nephrolithiasis      5. Pulmonary nodule      6. Renal lesion      7. History of smoking      8.  History of prostatectomy          Medical Decision Making: moderate complexity

## 2019-09-10 ENCOUNTER — TELEPHONE (OUTPATIENT)
Dept: PRIMARY CARE CLINIC | Age: 64
End: 2019-09-10

## 2019-09-10 NOTE — TELEPHONE ENCOUNTER
Patient called, he was seen by Kami Betancourt on 9/4/19 and prescribed Lovaza. The pharmacy told him they have sent over 2 requests for a PA for this. He would like to know the status. Please call.

## 2019-09-16 PROBLEM — Z76.0 MEDICATION REFILL: Status: ACTIVE | Noted: 2019-09-16

## 2019-09-16 PROBLEM — Z09 HOSPITAL DISCHARGE FOLLOW-UP: Status: ACTIVE | Noted: 2019-09-16

## 2019-09-16 ASSESSMENT — ENCOUNTER SYMPTOMS
CHEST TIGHTNESS: 0
VOMITING: 0
SORE THROAT: 0
BLOOD IN STOOL: 0
ABDOMINAL PAIN: 0
WHEEZING: 0
CONSTIPATION: 0
EYE REDNESS: 0
VOICE CHANGE: 0
RHINORRHEA: 0
SHORTNESS OF BREATH: 0
DIARRHEA: 0
COUGH: 0
NAUSEA: 0
TROUBLE SWALLOWING: 0

## 2019-09-17 ENCOUNTER — HOSPITAL ENCOUNTER (OUTPATIENT)
Dept: SLEEP CENTER | Age: 64
Discharge: HOME | End: 2019-09-19
Payer: MEDICARE

## 2019-09-17 PROCEDURE — 95806 SLEEP STUDY UNATT&RESP EFFT: CPT | Performed by: PSYCHIATRY & NEUROLOGY

## 2019-09-17 PROCEDURE — G0399 HOME SLEEP TEST/TYPE 3 PORTA: HCPCS

## 2019-10-02 ENCOUNTER — HOSPITAL ENCOUNTER (OUTPATIENT)
Dept: CT IMAGING | Age: 64
Discharge: HOME | End: 2019-10-02
Payer: MEDICARE

## 2019-10-02 DIAGNOSIS — N28.9 RENAL LESION: ICD-10-CM

## 2019-10-02 PROCEDURE — 74170 CT ABD WO CNTRST FLWD CNTRST: CPT

## 2019-10-02 PROCEDURE — 6360000004 HC RX CONTRAST MEDICATION: Performed by: UROLOGY

## 2019-10-02 RX ADMIN — IOPAMIDOL 90 ML: 755 INJECTION, SOLUTION INTRAVENOUS at 11:08

## 2019-10-04 ENCOUNTER — OFFICE VISIT (OUTPATIENT)
Dept: PRIMARY CARE CLINIC | Age: 64
End: 2019-10-04
Payer: MEDICARE

## 2019-10-04 VITALS
WEIGHT: 207 LBS | HEART RATE: 60 BPM | TEMPERATURE: 97.4 F | OXYGEN SATURATION: 99 % | HEIGHT: 72 IN | BODY MASS INDEX: 28.04 KG/M2 | DIASTOLIC BLOOD PRESSURE: 78 MMHG | SYSTOLIC BLOOD PRESSURE: 122 MMHG

## 2019-10-04 DIAGNOSIS — K59.00 CONSTIPATION, UNSPECIFIED CONSTIPATION TYPE: ICD-10-CM

## 2019-10-04 DIAGNOSIS — Z90.79 HISTORY OF PROSTATECTOMY: ICD-10-CM

## 2019-10-04 DIAGNOSIS — Z87.898 HISTORY OF CHEST PAIN: ICD-10-CM

## 2019-10-04 DIAGNOSIS — E78.1 HYPERTRIGLYCERIDEMIA: ICD-10-CM

## 2019-10-04 DIAGNOSIS — Z23 NEED FOR PROPHYLACTIC VACCINATION AND INOCULATION AGAINST VARICELLA: ICD-10-CM

## 2019-10-04 DIAGNOSIS — R91.1 PULMONARY NODULE: ICD-10-CM

## 2019-10-04 DIAGNOSIS — N28.9 RENAL LESION: ICD-10-CM

## 2019-10-04 DIAGNOSIS — R79.89 ELEVATED D-DIMER: ICD-10-CM

## 2019-10-04 DIAGNOSIS — N20.0 RIGHT NEPHROLITHIASIS: ICD-10-CM

## 2019-10-04 DIAGNOSIS — E78.2 MIXED HYPERLIPIDEMIA: ICD-10-CM

## 2019-10-04 DIAGNOSIS — R10.12 LEFT UPPER QUADRANT PAIN: ICD-10-CM

## 2019-10-04 DIAGNOSIS — I74.4: ICD-10-CM

## 2019-10-04 DIAGNOSIS — I71.40 ABDOMINAL AORTIC ANEURYSM (AAA) WITHOUT RUPTURE: ICD-10-CM

## 2019-10-04 DIAGNOSIS — R79.9 ABNORMAL FINDING OF BLOOD CHEMISTRY: ICD-10-CM

## 2019-10-04 DIAGNOSIS — K40.20 BILATERAL INGUINAL HERNIA WITHOUT OBSTRUCTION OR GANGRENE, RECURRENCE NOT SPECIFIED: ICD-10-CM

## 2019-10-04 DIAGNOSIS — N20.0 LEFT NEPHROLITHIASIS: ICD-10-CM

## 2019-10-04 DIAGNOSIS — Z87.19 HISTORY OF ACUTE PANCREATITIS: ICD-10-CM

## 2019-10-04 DIAGNOSIS — R79.89 ELEVATED SERUM CREATININE: ICD-10-CM

## 2019-10-04 DIAGNOSIS — I10 ESSENTIAL HYPERTENSION: Primary | ICD-10-CM

## 2019-10-04 DIAGNOSIS — Z76.0 MEDICATION REFILL: ICD-10-CM

## 2019-10-04 PROCEDURE — G8598 ASA/ANTIPLAT THER USED: HCPCS | Performed by: NURSE PRACTITIONER

## 2019-10-04 PROCEDURE — 1123F ACP DISCUSS/DSCN MKR DOCD: CPT | Performed by: NURSE PRACTITIONER

## 2019-10-04 PROCEDURE — 3017F COLORECTAL CA SCREEN DOC REV: CPT | Performed by: NURSE PRACTITIONER

## 2019-10-04 PROCEDURE — G8427 DOCREV CUR MEDS BY ELIG CLIN: HCPCS | Performed by: NURSE PRACTITIONER

## 2019-10-04 PROCEDURE — G8419 CALC BMI OUT NRM PARAM NOF/U: HCPCS | Performed by: NURSE PRACTITIONER

## 2019-10-04 PROCEDURE — G8484 FLU IMMUNIZE NO ADMIN: HCPCS | Performed by: NURSE PRACTITIONER

## 2019-10-04 PROCEDURE — 4040F PNEUMOC VAC/ADMIN/RCVD: CPT | Performed by: NURSE PRACTITIONER

## 2019-10-04 PROCEDURE — 99214 OFFICE O/P EST MOD 30 MIN: CPT | Performed by: NURSE PRACTITIONER

## 2019-10-04 PROCEDURE — 1036F TOBACCO NON-USER: CPT | Performed by: NURSE PRACTITIONER

## 2019-10-04 RX ORDER — OMEGA-3-ACID ETHYL ESTERS 1 G/1
2 CAPSULE, LIQUID FILLED ORAL 2 TIMES DAILY
Qty: 120 CAPSULE | Refills: 2 | Status: SHIPPED | OUTPATIENT
Start: 2019-10-04 | End: 2019-10-26 | Stop reason: SDUPTHER

## 2019-10-07 ENCOUNTER — OFFICE VISIT (OUTPATIENT)
Dept: CARDIOLOGY | Age: 64
End: 2019-10-07
Payer: MEDICARE

## 2019-10-07 VITALS
WEIGHT: 209 LBS | BODY MASS INDEX: 28.31 KG/M2 | DIASTOLIC BLOOD PRESSURE: 84 MMHG | SYSTOLIC BLOOD PRESSURE: 130 MMHG | HEIGHT: 72 IN | HEART RATE: 59 BPM

## 2019-10-07 DIAGNOSIS — Z87.898 HISTORY OF CHEST PAIN: ICD-10-CM

## 2019-10-07 DIAGNOSIS — E78.1 HYPERTRIGLYCERIDEMIA: ICD-10-CM

## 2019-10-07 DIAGNOSIS — E78.2 MIXED HYPERLIPIDEMIA: Chronic | ICD-10-CM

## 2019-10-07 DIAGNOSIS — I20.0 UNSTABLE ANGINA PECTORIS (HCC): Primary | ICD-10-CM

## 2019-10-07 DIAGNOSIS — I10 ESSENTIAL HYPERTENSION: ICD-10-CM

## 2019-10-07 PROBLEM — Z23 NEED FOR PROPHYLACTIC VACCINATION AND INOCULATION AGAINST VARICELLA: Status: ACTIVE | Noted: 2019-10-07

## 2019-10-07 PROBLEM — R79.9 ABNORMAL FINDING OF BLOOD CHEMISTRY: Status: ACTIVE | Noted: 2019-10-07

## 2019-10-07 PROBLEM — Z90.79 HISTORY OF PROSTATECTOMY: Status: ACTIVE | Noted: 2019-10-07

## 2019-10-07 PROBLEM — N20.0 LEFT NEPHROLITHIASIS: Status: ACTIVE | Noted: 2019-08-31

## 2019-10-07 PROBLEM — Z87.19 HISTORY OF ACUTE PANCREATITIS: Status: ACTIVE | Noted: 2019-10-07

## 2019-10-07 PROBLEM — I71.40 ABDOMINAL AORTIC ANEURYSM (AAA) WITHOUT RUPTURE: Status: ACTIVE | Noted: 2019-10-07

## 2019-10-07 PROBLEM — K59.00 CONSTIPATION: Status: ACTIVE | Noted: 2019-10-07

## 2019-10-07 PROBLEM — R79.89 ELEVATED D-DIMER: Status: ACTIVE | Noted: 2019-10-07

## 2019-10-07 PROBLEM — K40.20 BILATERAL INGUINAL HERNIA WITHOUT OBSTRUCTION OR GANGRENE: Status: ACTIVE | Noted: 2019-10-07

## 2019-10-07 PROBLEM — R10.12 LEFT UPPER QUADRANT PAIN: Status: ACTIVE | Noted: 2019-10-07

## 2019-10-07 PROBLEM — I74.4: Status: ACTIVE | Noted: 2019-10-07

## 2019-10-07 PROBLEM — R79.89 ELEVATED SERUM CREATININE: Status: ACTIVE | Noted: 2019-10-07

## 2019-10-07 PROCEDURE — G8598 ASA/ANTIPLAT THER USED: HCPCS | Performed by: INTERNAL MEDICINE

## 2019-10-07 PROCEDURE — 3017F COLORECTAL CA SCREEN DOC REV: CPT | Performed by: INTERNAL MEDICINE

## 2019-10-07 PROCEDURE — 4040F PNEUMOC VAC/ADMIN/RCVD: CPT | Performed by: INTERNAL MEDICINE

## 2019-10-07 PROCEDURE — 93000 ELECTROCARDIOGRAM COMPLETE: CPT | Performed by: INTERNAL MEDICINE

## 2019-10-07 PROCEDURE — 99213 OFFICE O/P EST LOW 20 MIN: CPT | Performed by: INTERNAL MEDICINE

## 2019-10-07 PROCEDURE — G8427 DOCREV CUR MEDS BY ELIG CLIN: HCPCS | Performed by: INTERNAL MEDICINE

## 2019-10-07 PROCEDURE — G8419 CALC BMI OUT NRM PARAM NOF/U: HCPCS | Performed by: INTERNAL MEDICINE

## 2019-10-07 PROCEDURE — 1036F TOBACCO NON-USER: CPT | Performed by: INTERNAL MEDICINE

## 2019-10-07 PROCEDURE — 1123F ACP DISCUSS/DSCN MKR DOCD: CPT | Performed by: INTERNAL MEDICINE

## 2019-10-07 PROCEDURE — G8484 FLU IMMUNIZE NO ADMIN: HCPCS | Performed by: INTERNAL MEDICINE

## 2019-10-07 ASSESSMENT — ENCOUNTER SYMPTOMS
BLOOD IN STOOL: 0
SHORTNESS OF BREATH: 0
DIARRHEA: 0
WHEEZING: 0
SHORTNESS OF BREATH: 0
RHINORRHEA: 0
VOICE CHANGE: 0
RESPIRATORY NEGATIVE: 1
GASTROINTESTINAL NEGATIVE: 1
VOMITING: 0
VOMITING: 0
SORE THROAT: 0
EYE REDNESS: 0
ABDOMINAL PAIN: 0
TROUBLE SWALLOWING: 0
NAUSEA: 0
CHEST TIGHTNESS: 0
CONSTIPATION: 0
EYES NEGATIVE: 1
DIARRHEA: 0
NAUSEA: 0
COUGH: 0

## 2019-10-11 ENCOUNTER — TELEPHONE (OUTPATIENT)
Dept: PRIMARY CARE CLINIC | Age: 64
End: 2019-10-11

## 2019-10-11 ENCOUNTER — HOSPITAL ENCOUNTER (OUTPATIENT)
Dept: VASCULAR LAB | Age: 64
Discharge: HOME | End: 2019-10-11
Payer: MEDICARE

## 2019-10-11 DIAGNOSIS — R79.89 ELEVATED D-DIMER: ICD-10-CM

## 2019-10-11 DIAGNOSIS — I74.4: ICD-10-CM

## 2019-10-11 DIAGNOSIS — Z87.898 HISTORY OF CHEST PAIN: ICD-10-CM

## 2019-10-11 PROCEDURE — 93970 EXTREMITY STUDY: CPT

## 2019-10-12 DIAGNOSIS — G47.33 SLEEP APNEA, OBSTRUCTIVE: Primary | ICD-10-CM

## 2019-10-16 PROBLEM — Z09 HOSPITAL DISCHARGE FOLLOW-UP: Status: RESOLVED | Noted: 2019-09-16 | Resolved: 2019-10-16

## 2019-10-31 ENCOUNTER — OFFICE VISIT (OUTPATIENT)
Dept: PRIMARY CARE CLINIC | Age: 64
End: 2019-10-31
Payer: MEDICARE

## 2019-10-31 VITALS
BODY MASS INDEX: 28.58 KG/M2 | WEIGHT: 211 LBS | OXYGEN SATURATION: 98 % | DIASTOLIC BLOOD PRESSURE: 78 MMHG | HEIGHT: 72 IN | SYSTOLIC BLOOD PRESSURE: 122 MMHG | TEMPERATURE: 98.3 F | HEART RATE: 64 BPM

## 2019-10-31 DIAGNOSIS — C61 PROSTATE CANCER (HCC): ICD-10-CM

## 2019-10-31 DIAGNOSIS — Z12.5 ENCOUNTER FOR SCREENING FOR MALIGNANT NEOPLASM OF PROSTATE: ICD-10-CM

## 2019-10-31 DIAGNOSIS — N18.30 CKD (CHRONIC KIDNEY DISEASE) STAGE 3, GFR 30-59 ML/MIN (HCC): ICD-10-CM

## 2019-10-31 DIAGNOSIS — E21.0 HYPERPARATHYROIDISM, PRIMARY (HCC): Chronic | ICD-10-CM

## 2019-10-31 DIAGNOSIS — I71.40 ABDOMINAL AORTIC ANEURYSM (AAA) WITHOUT RUPTURE: Primary | ICD-10-CM

## 2019-10-31 PROCEDURE — 1123F ACP DISCUSS/DSCN MKR DOCD: CPT | Performed by: FAMILY MEDICINE

## 2019-10-31 PROCEDURE — 90686 IIV4 VACC NO PRSV 0.5 ML IM: CPT | Performed by: FAMILY MEDICINE

## 2019-10-31 PROCEDURE — G8417 CALC BMI ABV UP PARAM F/U: HCPCS | Performed by: FAMILY MEDICINE

## 2019-10-31 PROCEDURE — 3017F COLORECTAL CA SCREEN DOC REV: CPT | Performed by: FAMILY MEDICINE

## 2019-10-31 PROCEDURE — 4040F PNEUMOC VAC/ADMIN/RCVD: CPT | Performed by: FAMILY MEDICINE

## 2019-10-31 PROCEDURE — G8598 ASA/ANTIPLAT THER USED: HCPCS | Performed by: FAMILY MEDICINE

## 2019-10-31 PROCEDURE — 1036F TOBACCO NON-USER: CPT | Performed by: FAMILY MEDICINE

## 2019-10-31 PROCEDURE — G0008 ADMIN INFLUENZA VIRUS VAC: HCPCS | Performed by: FAMILY MEDICINE

## 2019-10-31 PROCEDURE — G8427 DOCREV CUR MEDS BY ELIG CLIN: HCPCS | Performed by: FAMILY MEDICINE

## 2019-10-31 PROCEDURE — 99214 OFFICE O/P EST MOD 30 MIN: CPT | Performed by: FAMILY MEDICINE

## 2019-10-31 PROCEDURE — G8482 FLU IMMUNIZE ORDER/ADMIN: HCPCS | Performed by: FAMILY MEDICINE

## 2019-10-31 ASSESSMENT — ENCOUNTER SYMPTOMS
WHEEZING: 0
ABDOMINAL PAIN: 0
CONSTIPATION: 0
DIARRHEA: 0
COUGH: 0
SHORTNESS OF BREATH: 0
NAUSEA: 0
VOMITING: 0
CHEST TIGHTNESS: 0

## 2020-01-07 ENCOUNTER — OFFICE VISIT (OUTPATIENT)
Dept: NEUROLOGY | Age: 65
End: 2020-01-07
Payer: MEDICARE

## 2020-01-07 VITALS
RESPIRATION RATE: 16 BRPM | HEART RATE: 60 BPM | HEIGHT: 72 IN | BODY MASS INDEX: 29.05 KG/M2 | SYSTOLIC BLOOD PRESSURE: 104 MMHG | WEIGHT: 214.5 LBS | DIASTOLIC BLOOD PRESSURE: 65 MMHG

## 2020-01-07 PROCEDURE — 1123F ACP DISCUSS/DSCN MKR DOCD: CPT | Performed by: PSYCHIATRY & NEUROLOGY

## 2020-01-07 PROCEDURE — G8482 FLU IMMUNIZE ORDER/ADMIN: HCPCS | Performed by: PSYCHIATRY & NEUROLOGY

## 2020-01-07 PROCEDURE — G8417 CALC BMI ABV UP PARAM F/U: HCPCS | Performed by: PSYCHIATRY & NEUROLOGY

## 2020-01-07 PROCEDURE — 3017F COLORECTAL CA SCREEN DOC REV: CPT | Performed by: PSYCHIATRY & NEUROLOGY

## 2020-01-07 PROCEDURE — 1036F TOBACCO NON-USER: CPT | Performed by: PSYCHIATRY & NEUROLOGY

## 2020-01-07 PROCEDURE — 4040F PNEUMOC VAC/ADMIN/RCVD: CPT | Performed by: PSYCHIATRY & NEUROLOGY

## 2020-01-07 PROCEDURE — 99213 OFFICE O/P EST LOW 20 MIN: CPT | Performed by: PSYCHIATRY & NEUROLOGY

## 2020-01-07 PROCEDURE — G8427 DOCREV CUR MEDS BY ELIG CLIN: HCPCS | Performed by: PSYCHIATRY & NEUROLOGY

## 2020-01-07 NOTE — PROGRESS NOTES
20.00 pack-year smoking history. He has never used smokeless tobacco. He reports current alcohol use of about 2.0 standard drinks of alcohol per week. He reports that he does not use drugs. Family History   Problem Relation Age of Onset   [de-identified] Cancer Mother 52        large cell nonkeratinizing squamous cell of the pancreas                          COPD Father     Cancer Father         Hodgkin's disease head, face, neck    Stroke Father         ischemic       Social History  Mona Carmichael is , lives in [place], [place], and is retired. Medications:  Current Outpatient Medications   Medication Sig Dispense Refill    irbesartan (AVAPRO) 150 MG tablet 1/2 tablet in the morning and 1 full at beditme. 180 tablet 2    aspirin 81 MG chewable tablet Take 1 tablet by mouth daily 30 tablet 3    nitroGLYCERIN (NITROSTAT) 0.4 MG SL tablet up to max of 3 total doses. If no relief after 1 dose, call 911. 25 tablet 3    fenofibrate 160 MG tablet TAKE 1 TABLET DAILY 90 tablet 3    atorvastatin (LIPITOR) 20 MG tablet TAKE 1 TABLET EVERY EVENING 90 tablet 3    Probiotic Product (PROBIOTIC ADVANCED PO) Take by mouth daily       Ascorbic Acid (VITAMIN C) 1000 MG tablet Take 1,000 mg by mouth daily      Multiple Vitamin (MULTIVITAMIN) capsule Take 1 capsule by mouth daily        No current facility-administered medications for this visit. Allergies:   Allergies as of 01/07/2020    (No Known Allergies)       Review of Systems:  General ROS: negative for - chills or fever  Psychological ROS: negative for - anxiety or depression  ENT ROS: negative for - headaches or sinus pain  Hematological and Lymphatic ROS: negative for - bleeding problems, bruising or swollen lymph nodes  Respiratory ROS: negative for - cough, hemoptysis or shortness of breath  Cardiovascular ROS: negative for - chest pain or palpitations  Gastrointestinal ROS: negative for - blood in stools, constipation, diarrhea or

## 2020-01-22 ENCOUNTER — TELEPHONE (OUTPATIENT)
Dept: NEUROLOGY | Age: 65
End: 2020-01-22

## 2020-02-06 ENCOUNTER — TRANSCRIBE ORDERS (OUTPATIENT)
Dept: ADMINISTRATIVE | Facility: HOSPITAL | Age: 65
End: 2020-02-06

## 2020-02-06 ENCOUNTER — APPOINTMENT (OUTPATIENT)
Dept: LAB | Facility: HOSPITAL | Age: 65
End: 2020-02-06

## 2020-02-06 DIAGNOSIS — Z12.5 ENCOUNTER FOR SCREENING FOR MALIGNANT NEOPLASM OF PROSTATE: Primary | ICD-10-CM

## 2020-02-06 DIAGNOSIS — C61 PROSTATE CANCER (HCC): ICD-10-CM

## 2020-02-06 LAB — PSA SERPL-MCNC: 4.87 NG/ML (ref 0–4)

## 2020-02-06 PROCEDURE — 84153 ASSAY OF PSA TOTAL: CPT | Performed by: FAMILY MEDICINE

## 2020-02-06 PROCEDURE — 36415 COLL VENOUS BLD VENIPUNCTURE: CPT | Performed by: FAMILY MEDICINE

## 2020-02-21 ENCOUNTER — TELEPHONE (OUTPATIENT)
Dept: NEUROLOGY | Age: 65
End: 2020-02-21

## 2020-02-21 NOTE — TELEPHONE ENCOUNTER
Called patient to let him know that his appointment for 07-14-20 with Dr. Kailee Grimes had to be rescheduled due to the provider will be out of the office. NO answer. Left a VM regarding when I have his appointment rescheduled too.

## 2020-04-16 ENCOUNTER — OFFICE VISIT (OUTPATIENT)
Dept: CARDIOLOGY | Age: 65
End: 2020-04-16
Payer: MEDICARE

## 2020-04-16 VITALS
DIASTOLIC BLOOD PRESSURE: 84 MMHG | WEIGHT: 220 LBS | SYSTOLIC BLOOD PRESSURE: 132 MMHG | HEIGHT: 77 IN | HEART RATE: 72 BPM | BODY MASS INDEX: 25.98 KG/M2

## 2020-04-16 PROCEDURE — 4040F PNEUMOC VAC/ADMIN/RCVD: CPT | Performed by: INTERNAL MEDICINE

## 2020-04-16 PROCEDURE — G8427 DOCREV CUR MEDS BY ELIG CLIN: HCPCS | Performed by: INTERNAL MEDICINE

## 2020-04-16 PROCEDURE — 1123F ACP DISCUSS/DSCN MKR DOCD: CPT | Performed by: INTERNAL MEDICINE

## 2020-04-16 PROCEDURE — 99213 OFFICE O/P EST LOW 20 MIN: CPT | Performed by: INTERNAL MEDICINE

## 2020-04-16 PROCEDURE — 3017F COLORECTAL CA SCREEN DOC REV: CPT | Performed by: INTERNAL MEDICINE

## 2020-04-16 PROCEDURE — G8417 CALC BMI ABV UP PARAM F/U: HCPCS | Performed by: INTERNAL MEDICINE

## 2020-04-16 PROCEDURE — 1036F TOBACCO NON-USER: CPT | Performed by: INTERNAL MEDICINE

## 2020-04-16 PROCEDURE — 93000 ELECTROCARDIOGRAM COMPLETE: CPT | Performed by: INTERNAL MEDICINE

## 2020-04-16 ASSESSMENT — ENCOUNTER SYMPTOMS
RESPIRATORY NEGATIVE: 1
VOMITING: 0
SHORTNESS OF BREATH: 0
EYES NEGATIVE: 1
GASTROINTESTINAL NEGATIVE: 1
NAUSEA: 0
DIARRHEA: 0

## 2020-04-16 NOTE — PROGRESS NOTES
hyperlipidemia 8/10/2017    Prostate carcinoma ([facility] [address] 8/10/2017     Past Surgical History:   Procedure Laterality Date    KNEE SURGERY Left     knee ligamentous reconstruction with residual degenerative arthritis    PARATHYROIDECTOMY Left 09/16/2016    parathyroid resection single tumor removal left inferior/adenoma with associated hyperparathyroidism     PROSTATECTOMY  2012    TONSILLECTOMY      TOTAL KNEE ARTHROPLASTY Left 5/17/2017    KNEE TOTAL ARTHROPLASTY performed by [facility]del[facility]man [facility]era [facility] at [facility] History   Problem Relation Age of Onset    Cancer Mother 52        large cell nonkeratinizing squamous cell of the pancreas                          COPD Father     Cancer Father         Hodgkin's disease head, face, neck    Stroke Father         ischemic     No Known Allergies  Current Outpatient Medications   Medication Sig Dispense Refill    Zinc Sulfate (ZINC 15 PO) Take by mouth      irbesartan (AVAPRO) 150 MG tablet 1/2 tablet in the morning and 1 full at beditme. 180 tablet 2    aspirin 81 MG chewable tablet Take 1 tablet by mouth daily 30 tablet 3    nitroGLYCERIN (NITROSTAT) 0.4 MG SL tablet up to max of 3 total doses. If no relief after 1 dose, call 911. 25 tablet 3    fenofibrate 160 MG tablet TAKE 1 TABLET DAILY 90 tablet 3    atorvastatin (LIPITOR) 20 MG tablet TAKE 1 TABLET EVERY EVENING 90 tablet 3    Probiotic Product (PROBIOTIC ADVANCED PO) Take by mouth daily       Ascorbic Acid (VITAMIN C) 1000 MG tablet Take 1,000 mg by mouth daily      Multiple Vitamin (MULTIVITAMIN) capsule Take 1 capsule by mouth daily        No current facility-administered medications for this visit.       Social History     Socioeconomic History    Marital status:      Spouse name: Chrystine Boxer Number of children: 2    Years of education: 25    Highest education level: Not on file   Occupational History     Employer: RETIRED   Social Needs    Financial resource strain: Not on file    Neck:      Vascular: No carotid bruit or JVD. Cardiovascular:      Rate and Rhythm: Normal rate and regular rhythm. Heart sounds: Normal heart sounds. No murmur. No friction rub. No gallop. Pulmonary:      Effort: Pulmonary effort is normal. No respiratory distress. Breath sounds: Normal breath sounds. No wheezing or rales. Abdominal:      General: There is no distension. Tenderness: There is no abdominal tenderness. Lymphadenopathy:      Cervical: No cervical adenopathy. Skin:     General: Skin is warm and dry. ASSESSMENT:     Diagnosis Orders   1. Essential hypertension  EKG 12 lead   2. Mixed hyperlipidemia     3. Stable angina pectoris ([address]         PLAN:  Orders Placed This Encounter   Procedures    EKG 12 lead     No orders of the defined types were placed in this encounter. 1. Continue present medications  2. Recommend follow-up assessment in 6 months    Return in about 6 months (around 10/16/2020) for return to Dr. Stu Boyce only. Torres Delong MD 4/16/2020 11:18 AM CDT    [id number] [facility]      Thisdictation was generated by voice recognition computer software. Although all attempts are made to edit the dictation for accuracy, there may be errors in the transcription that are not intended.

## 2020-05-05 ENCOUNTER — TELEMEDICINE (OUTPATIENT)
Dept: PRIMARY CARE CLINIC | Age: 65
End: 2020-05-05
Payer: MEDICARE

## 2020-05-05 PROCEDURE — 3017F COLORECTAL CA SCREEN DOC REV: CPT | Performed by: FAMILY MEDICINE

## 2020-05-05 PROCEDURE — G8428 CUR MEDS NOT DOCUMENT: HCPCS | Performed by: FAMILY MEDICINE

## 2020-05-05 PROCEDURE — 1123F ACP DISCUSS/DSCN MKR DOCD: CPT | Performed by: FAMILY MEDICINE

## 2020-05-05 PROCEDURE — G8417 CALC BMI ABV UP PARAM F/U: HCPCS | Performed by: FAMILY MEDICINE

## 2020-05-05 PROCEDURE — 1036F TOBACCO NON-USER: CPT | Performed by: FAMILY MEDICINE

## 2020-05-05 PROCEDURE — 4040F PNEUMOC VAC/ADMIN/RCVD: CPT | Performed by: FAMILY MEDICINE

## 2020-05-05 PROCEDURE — 99213 OFFICE O/P EST LOW 20 MIN: CPT | Performed by: FAMILY MEDICINE

## 2020-05-05 NOTE — PROGRESS NOTES
20.00     Pack years: 20.00     Types: Cigarettes     Last attempt to quit: 1992     Years since quittin.1    Smokeless tobacco: Never Used   Substance Use Topics    Alcohol use: Yes     Alcohol/week: 2.0 standard drinks     Types: 2 Glasses of wine per week     Comment: DAILY    Drug use: No        No Known Allergies,   Past Medical History:   Diagnosis Date    Arthritis     OSTEO    CKD (chronic kidney disease) stage 3, GFR 30-59 ml/min [facility] 8/10/2017    Hyperglycemia 8/10/2017    Hyperlipidemia     Hypertension     Mixed hyperlipidemia 8/10/2017    Prostate carcinoma ([facility] [address] 8/10/2017   ,   Past Surgical History:   Procedure Laterality Date    KNEE SURGERY Left     knee ligamentous reconstruction with residual degenerative arthritis    PARATHYROIDECTOMY Left 2016    parathyroid resection single tumor removal left inferior/adenoma with associated hyperparathyroidism     PROSTATECTOMY      TONSILLECTOMY      TOTAL KNEE ARTHROPLASTY Left 2017    KNEE TOTAL ARTHROPLASTY performed by Linnea Cárdenas MD at [address] [place] [address]   ,   Social History     Tobacco Use    Smoking status: Former Smoker     Packs/day: 1.00     Years: 20.00     Pack years: 20.00     Types: Cigarettes     Last attempt to quit: 1992     Years since quittin.1    Smokeless tobacco: Never Used   Substance Use Topics    Alcohol use:  Yes     Alcohol/week: 2.0 standard drinks     Types: 2 Glasses of wine per week     Comment: DAILY    Drug use: No   ,   Family History   Problem Relation Age of Onset    Cancer Mother 52        large cell nonkeratinizing squamous cell of the pancreas                          COPD Father     Cancer Father         Hodgkin's disease head, face, neck    Stroke Father         ischemic   ,   Immunization History   Administered Date(s) Administered    Influenza Vaccine, unspecified formulation 10/17/2018    Influenza Virus Vaccine 2012    Influenza, Quadv, IM, PF (6 mo and Neurological:        [x] No Facial Asymmetry (Cranial nerve 7 motor function) (limited exam to video visit)          [x] No gaze palsy        [] Abnormal-         Skin:        [x] No significant exanthematous lesions or discoloration noted on facial skin         [] Abnormal-            Psychiatric:       [x] Normal Affect [x] No Hallucinations        [] Abnormal-     Other pertinent observable physical exam findings-     ASSESSMENT/PLAN:    ICD-10-CM    1. Increasing prostate specific antigen level R97.20        Continue to monitor PSA closely. Pt advocates strongly for his care and watches it closely. No orders of the defined types were placed in this encounter. No orders of the defined types were placed in this encounter. Return if symptoms worsen or fail to improve. Parvin Curtis is a 75 y.o. male being evaluated by a Virtual Visit (video visit) encounter to address concerns as mentioned above. A caregiver was present when appropriate. Due to this being a TeleHealth encounter (During DUXIL-78 public health emergency), evaluation of the following organ systems was limited: Vitals/Constitutional/EENT/Resp/CV/GI//MS/Neuro/Skin/Heme-Lymph-Imm. Pursuant to the emergency declaration under the [address] [place] [address] authority and the [employer] and [employer] Act, this Virtual Visit was conducted with patient's (and/or legal guardian's) consent, to reduce the patient's risk of exposure to COVID-19 and provide necessary medical care. The patient (and/or legal guardian) has also been advised to contact this office for worsening conditions or problems, and seek emergency medical treatment and/or call 911 if deemed necessary. Services were provided through a video synchronous discussion virtually to substitute for in-person clinic visit.  Patient and provider were located at their individual homes or provider at

## 2020-05-24 ASSESSMENT — ENCOUNTER SYMPTOMS
SHORTNESS OF BREATH: 0
WHEEZING: 0
CHEST TIGHTNESS: 0
COUGH: 0
DIARRHEA: 0
CONSTIPATION: 0
ABDOMINAL PAIN: 0
VOMITING: 0
NAUSEA: 0

## 2020-06-08 ENCOUNTER — LAB (OUTPATIENT)
Dept: LAB | Facility: HOSPITAL | Age: 65
End: 2020-06-08

## 2020-06-08 ENCOUNTER — TRANSCRIBE ORDERS (OUTPATIENT)
Dept: ADMINISTRATIVE | Facility: HOSPITAL | Age: 65
End: 2020-06-08

## 2020-06-08 DIAGNOSIS — Z12.5 ENCOUNTER FOR SCREENING FOR MALIGNANT NEOPLASM OF PROSTATE: Primary | ICD-10-CM

## 2020-06-08 DIAGNOSIS — C61 PROSTATE CANCER (HCC): ICD-10-CM

## 2020-06-08 DIAGNOSIS — Z12.5 ENCOUNTER FOR SCREENING FOR MALIGNANT NEOPLASM OF PROSTATE: ICD-10-CM

## 2020-06-08 LAB — PSA SERPL-MCNC: 6.84 NG/ML (ref 0–4)

## 2020-06-08 PROCEDURE — 36415 COLL VENOUS BLD VENIPUNCTURE: CPT

## 2020-06-08 PROCEDURE — G0103 PSA SCREENING: HCPCS | Performed by: FAMILY MEDICINE

## 2020-06-11 ENCOUNTER — TELEPHONE (OUTPATIENT)
Dept: PRIMARY CARE CLINIC | Age: 65
End: 2020-06-11

## 2020-07-10 ENCOUNTER — TELEPHONE (OUTPATIENT)
Dept: PRIMARY CARE CLINIC | Age: 65
End: 2020-07-10

## 2020-07-11 ENCOUNTER — OFFICE VISIT (OUTPATIENT)
Dept: URGENT CARE | Age: 65
End: 2020-07-11
Payer: MEDICARE

## 2020-07-11 VITALS
OXYGEN SATURATION: 98 % | TEMPERATURE: 98.4 F | HEART RATE: 78 BPM | HEIGHT: 72 IN | BODY MASS INDEX: 30.23 KG/M2 | DIASTOLIC BLOOD PRESSURE: 74 MMHG | WEIGHT: 223.2 LBS | SYSTOLIC BLOOD PRESSURE: 126 MMHG | RESPIRATION RATE: 18 BRPM

## 2020-07-11 PROCEDURE — 99213 OFFICE O/P EST LOW 20 MIN: CPT | Performed by: NURSE PRACTITIONER

## 2020-07-11 RX ORDER — HYDROCORTISONE ACETATE 25 MG/1
25 SUPPOSITORY RECTAL 2 TIMES DAILY PRN
Qty: 14 SUPPOSITORY | Refills: 0 | Status: SHIPPED | OUTPATIENT
Start: 2020-07-11 | End: 2020-07-18

## 2020-07-11 ASSESSMENT — ENCOUNTER SYMPTOMS
ANAL BLEEDING: 0
RECTAL PAIN: 1
BLOOD IN STOOL: 0

## 2020-07-11 NOTE — PROGRESS NOTES
[address] [address]  Dept: [phone]  Dept Fax: [phone]  Loc: [phone]    Elena Duran is a 75 y.o. male who presents today for his medical conditions/complaintsas noted below. Elena Duran is c/o of Hemorrhoids        HPI:     Pt presents to clinic today with complaints of hemorrhoid. He states that this has been ongoing about 8 days. He has tried witch hazel pads and Preparation H with Lidocaine but he states that it is giving him temporary relief. He has an appt scheduled with [facility] but it is not until August 3 and they told him that they would not do any procedures in office that he would still have to be referred to a surgeon. He would like to see if he could get into [facility] sooner for possible hemorrhoid treatment.       Past Medical History:   Diagnosis Date    Arthritis     OSTEO    CKD (chronic kidney disease) stage 3, GFR 30-59 ml/min ([facility]) 8/10/2017    Hyperglycemia 8/10/2017    Hyperlipidemia     Hypertension     Mixed hyperlipidemia 8/10/2017    Prostate carcinoma ([address] 8/10/2017     Past Surgical History:   Procedure Laterality Date    KNEE SURGERY Left     knee ligamentous reconstruction with residual degenerative arthritis    PARATHYROIDECTOMY Left 09/16/2016    parathyroid resection single tumor removal left inferior/adenoma with associated hyperparathyroidism     PROSTATECTOMY  2012    TONSILLECTOMY      TOTAL KNEE ARTHROPLASTY Left 5/17/2017    KNEE TOTAL ARTHROPLASTY performed by Kristen Trejo MD at [address] History   Problem Relation Age of Onset    Cancer Mother 52        large cell nonkeratinizing squamous cell of the pancreas                          COPD Father     Cancer Father         Hodgkin's disease head, face, neck    Stroke Father         ischemic       Social History     Tobacco Use    Smoking status: Former Smoker     Packs/day: 1.00     Years: 20.00     Pack years: 20.00     Types: Cigarettes     Last attempt to quit: 1992     Years since quittin.2    Smokeless tobacco: Never Used   Substance Use Topics    Alcohol use: Yes     Alcohol/week: 2.0 standard drinks     Types: 2 Glasses of wine per week     Comment: DAILY      Current Outpatient Medications   Medication Sig Dispense Refill    hydrocortisone (ANUSOL-HC) 25 MG suppository Place 1 suppository rectally 2 times daily as needed for Hemorrhoids 14 suppository 0    Zinc Sulfate (ZINC 15 PO) Take by mouth      irbesartan (AVAPRO) 150 MG tablet 1/2 tablet in the morning and 1 full at beditme. 180 tablet 2    aspirin 81 MG chewable tablet Take 1 tablet by mouth daily 30 tablet 3    nitroGLYCERIN (NITROSTAT) 0.4 MG SL tablet up to max of 3 total doses. If no relief after 1 dose, call 911. 25 tablet 3    fenofibrate 160 MG tablet TAKE 1 TABLET DAILY 90 tablet 3    atorvastatin (LIPITOR) 20 MG tablet TAKE 1 TABLET EVERY EVENING 90 tablet 3    Probiotic Product (PROBIOTIC ADVANCED PO) Take by mouth daily       Ascorbic Acid (VITAMIN C) 1000 MG tablet Take 1,000 mg by mouth daily      Multiple Vitamin (MULTIVITAMIN) capsule Take 1 capsule by mouth daily        No current facility-administered medications for this visit.       No Known Allergies    Health Maintenance   Topic Date Due    Shingles Vaccine (1 of 2) 2003    PSA counseling  2019    Pneumococcal 65+ years Vaccine (2 of 2 - PPSV23) 2020    Annual Wellness Visit (AWV)  2020    A1C test (Diabetic or Prediabetic)  2020    Lipid screen  2020    Flu vaccine (1) 2020    Potassium monitoring  2020    Creatinine monitoring  2020    Colon cancer screen colonoscopy  04/15/2024    DTaP/Tdap/Td vaccine (2 - Td) 2028    Hepatitis C screen  Completed    Hepatitis A vaccine  Aged Out    Hepatitis B vaccine  Aged Out    Hib vaccine  Aged Out    Meningococcal (ACWY) vaccine  Aged Out       Subjective:     Review of Systems   Gastrointestinal: Positive for rectal pain. Negative for anal bleeding and blood in stool. Objective:     Physical Exam  Vitals signs and nursing note reviewed. Constitutional:       Appearance: Normal appearance. He is well-developed. HENT:      Head: Normocephalic and atraumatic. Eyes:      General: Lids are normal.      Conjunctiva/sclera: Conjunctivae normal.      Pupils: Pupils are equal, round, and reactive to light. Cardiovascular:      Rate and Rhythm: Normal rate. Pulmonary:      Effort: Pulmonary effort is normal.   Genitourinary:     Comments: No exam, patient showed me photo of external hemorrhoid. Neurological:      Mental Status: He is alert and oriented to person, place, and time. Psychiatric:         Speech: Speech normal.         Behavior: Behavior normal.       /74   Pulse 78   Temp 98.4 °F (36.9 °C) (Oral)   Resp 18   Ht 6' (1.829 m)   Wt 223 lb 3.2 oz (101.2 kg)   SpO2 98%   BMI 30.27 kg/m²     Assessment:       Diagnosis Orders   1. External hemorrhoid  Roseanna Saunders MD, Gastroenterology, [place]       Plan:      Orders Placed This Encounter   Procedures   Roseanna Saunders MD, Gastroenterology, [place]     Referral Priority:   Routine     Referral Type:   Eval and Treat     Referral Reason:   Specialty Services Required     Referred to Provider:   Conrad Colvin MD     Requested Specialty:   Gastroenterology     Number of Visits Requested:   1       Return if symptoms worsen or fail to improve. Orders Placed This Encounter   Medications    hydrocortisone (ANUSOL-HC) 25 MG suppository     Sig: Place 1 suppository rectally 2 times daily as needed for Hemorrhoids     Dispense:  14 suppository     Refill:  0       Patient given educational materials- see patient instructions. Discussed use, benefit, and side effects of prescribedmedications. All patient questions answered.   Pt voiced and cereals, raw vegetables, raw and dried fruits, and beans. · Drink plenty of fluids, enough so that your urine is light yellow or clear like water. If you have kidney, heart, or liver disease and have to limit fluids, talk with your doctor before you increase the amount of fluids you drink. · Use a stool softener that contains bran or psyllium. You can save money by buying bran or psyllium (available in bulk at most health food stores) and sprinkling it on foods or stirring it into fruit juice. Or you can use a product such as Metamucil or Hydrocil. · Practice healthy bowel habits. ? Go to the bathroom as soon as you have the urge. ? Avoid straining to pass stools. Relax and give yourself time to let things happen naturally. ? Do not hold your breath while passing stools. ? Do not read while sitting on the toilet. Get off the toilet as soon as you have finished. · Take your medicines exactly as prescribed. Call your doctor if you think you are having a problem with your medicine. When should you call for help? [id number] anytime you think you may need emergency care. For example, call if:  · You pass maroon or very bloody stools. Call your doctor now or seek immediate medical care if:  · You have increased pain. · You have increased bleeding. Watch closely for changes in your health, and be sure to contact your doctor if:  · Your symptoms have not improved after 3 or 4 days. Where can you learn more? Go to [email or web address] [email or web address].[email or web address]. org and sign in to your [employer] account. Enter C581 in the [facility] box to learn more about \"Hemorrhoids: Care Instructions. \"     If you do not have an account, please click on the \"Sign Up Now\" link. Current as of: August 12, 2019               Content Version: 12.5  © 0747-6262 Healthwise, Incorporated. Care instructions adapted under license by [facility] ([facility]).  If you have questions about a medical condition or this instruction, always ask

## 2020-07-11 NOTE — PATIENT INSTRUCTIONS
Patient Education        Hemorrhoids: Care Instructions  Your Care Instructions     Hemorrhoids are enlarged veins that develop in the anal canal. Bleeding during bowel movements, itching, swelling, and rectal pain are the most common symptoms. They can be uncomfortable at times, but hemorrhoids rarely are a serious problem. You can treat most hemorrhoids with simple changes to your diet and bowel habits. These changes include eating more fiber and not straining to pass stools. Most hemorrhoids do not need surgery or other treatment unless they are very large and painful or bleed a lot. Follow-up care is a key part of your treatment and safety. Be sure to make and go to all appointments, and call your doctor if you are having problems. It's also a good idea to know your test results and keep a list of the medicines you take. How can you care for yourself at home? · Sit in a few inches of warm water (sitz bath) 3 times a day and after bowel movements. The warm water helps with pain and itching. · Put ice on your anal area several times a day for 10 minutes at a time. Put a thin cloth between the ice and your skin. Follow this by placing a warm, wet towel on the area for another 10 to 20 minutes. · Take pain medicines exactly as directed. ? If the doctor gave you a prescription medicine for pain, take it as prescribed. ? If you are not taking a prescription pain medicine, ask your doctor if you can take an over-the-counter medicine. · Keep the anal area clean, but be gentle. Use water and a fragrance-free soap, such as [place], or use baby wipes or medicated pads, such as Tucks. · Wear cotton underwear and loose clothing to decrease moisture in the anal area. · Eat more fiber. Include foods such as whole-grain breads and cereals, raw vegetables, raw and dried fruits, and beans. · Drink plenty of fluids, enough so that your urine is light yellow or clear like water.  If you have kidney, heart, or liver disease and have to limit fluids, talk with your doctor before you increase the amount of fluids you drink. · Use a stool softener that contains bran or psyllium. You can save money by buying bran or psyllium (available in bulk at most health food stores) and sprinkling it on foods or stirring it into fruit juice. Or you can use a product such as Metamucil or Hydrocil. · Practice healthy bowel habits. ? Go to the bathroom as soon as you have the urge. ? Avoid straining to pass stools. Relax and give yourself time to let things happen naturally. ? Do not hold your breath while passing stools. ? Do not read while sitting on the toilet. Get off the toilet as soon as you have finished. · Take your medicines exactly as prescribed. Call your doctor if you think you are having a problem with your medicine. When should you call for help? [id number] anytime you think you may need emergency care. For example, call if:  · You pass maroon or very bloody stools. Call your doctor now or seek immediate medical care if:  · You have increased pain. · You have increased bleeding. Watch closely for changes in your health, and be sure to contact your doctor if:  · Your symptoms have not improved after 3 or 4 days. Where can you learn more? Go to [email or web address]"[email or web address]. org and sign in to your [employer] account. Enter S734 in the [facility] box to learn more about \"Hemorrhoids: Care Instructions. \"     If you do not have an account, please click on the \"Sign Up Now\" link. Current as of: August 12, 2019               Content Version: 12.5  © 4518-3034 Healthwise, Incorporated. Care instructions adapted under license by [facility] ([facility]). If you have questions about a medical condition or this instruction, always ask your healthcare professional. [address] any warranty or liability for your use of this information.

## 2020-07-15 RX ORDER — ATORVASTATIN CALCIUM 20 MG/1
TABLET, FILM COATED ORAL
Qty: 90 TABLET | Refills: 3 | Status: SHIPPED | OUTPATIENT
Start: 2020-07-15 | End: 2021-06-13

## 2020-07-15 RX ORDER — FENOFIBRATE 160 MG/1
TABLET ORAL
Qty: 90 TABLET | Refills: 3 | Status: SHIPPED | OUTPATIENT
Start: 2020-07-15 | End: 2021-06-13

## 2020-07-16 ENCOUNTER — OFFICE VISIT (OUTPATIENT)
Dept: SURGERY | Age: 65
End: 2020-07-16
Payer: MEDICARE

## 2020-07-16 VITALS
TEMPERATURE: 97.7 F | BODY MASS INDEX: 29.53 KG/M2 | WEIGHT: 218 LBS | DIASTOLIC BLOOD PRESSURE: 70 MMHG | SYSTOLIC BLOOD PRESSURE: 118 MMHG | HEIGHT: 72 IN

## 2020-07-16 PROCEDURE — 3017F COLORECTAL CA SCREEN DOC REV: CPT | Performed by: SURGERY

## 2020-07-16 PROCEDURE — 99204 OFFICE O/P NEW MOD 45 MIN: CPT | Performed by: SURGERY

## 2020-07-16 PROCEDURE — G8428 CUR MEDS NOT DOCUMENT: HCPCS | Performed by: SURGERY

## 2020-07-16 PROCEDURE — 4040F PNEUMOC VAC/ADMIN/RCVD: CPT | Performed by: SURGERY

## 2020-07-16 PROCEDURE — 1036F TOBACCO NON-USER: CPT | Performed by: SURGERY

## 2020-07-16 PROCEDURE — 1123F ACP DISCUSS/DSCN MKR DOCD: CPT | Performed by: SURGERY

## 2020-07-16 PROCEDURE — G8417 CALC BMI ABV UP PARAM F/U: HCPCS | Performed by: SURGERY

## 2020-07-30 ASSESSMENT — ENCOUNTER SYMPTOMS
COUGH: 0
ABDOMINAL PAIN: 0
BACK PAIN: 0
WHEEZING: 0
NAUSEA: 0
EYE PAIN: 0
ANAL BLEEDING: 1
CONSTIPATION: 0
SHORTNESS OF BREATH: 0
EYE REDNESS: 0
RHINORRHEA: 0
ABDOMINAL DISTENTION: 0
SORE THROAT: 0

## 2020-07-30 NOTE — PROGRESS NOTES
Subjective:      Patient ID: Devan Allred is a 75 y.o. male. HPI     Mr. Kannan Murrell is a 75year old male who presents with a complaint of perianal pain and suspected hemorrhoid disease. He reports that he started having pain and swelling on the 4th of July, and then the area really 'blew up.' He went to urgent care on Friday, and was given a suppository. This did help a little. He also used over the counter Prep H with lidocaine. Yesterday he had a pop of blood that seemed to give a lot of relief. He denies any issues with hemorrhoids in the past. He has had three colonoscopies in the past, the most recent was about 1 year ago with Dr. Sury Kahn, with no significant findings. He denies any issues with constipation, and does not use a bowel regimen. Past Medical History:   Diagnosis Date    Arthritis     OSTEO    CKD (chronic kidney disease) stage 3, GFR 30-59 ml/min ([facility]) 8/10/2017    Hyperglycemia 8/10/2017    Hyperlipidemia     Hypertension     Mixed hyperlipidemia 8/10/2017    Prostate carcinoma ([facility] [address] 8/10/2017     Past Surgical History:   Procedure Laterality Date    KNEE SURGERY Left     knee ligamentous reconstruction with residual degenerative arthritis    PARATHYROIDECTOMY Left 09/16/2016    parathyroid resection single tumor removal left inferior/adenoma with associated hyperparathyroidism     PROSTATECTOMY  2012    TONSILLECTOMY      TOTAL KNEE ARTHROPLASTY Left 5/17/2017    KNEE TOTAL ARTHROPLASTY performed by Shyam Dove MD at [address] [place] [address]     Current Outpatient Medications on File Prior to Visit   Medication Sig Dispense Refill    atorvastatin (LIPITOR) 20 MG tablet TAKE 1 TABLET EVERY EVENING 90 tablet 3    fenofibrate (TRIGLIDE) 160 MG tablet TAKE 1 TABLET EVERY DAY 90 tablet 3    Zinc Sulfate (ZINC 15 PO) Take by mouth      irbesartan (AVAPRO) 150 MG tablet 1/2 tablet in the morning and 1 full at beditme.  180 tablet 2    Probiotic Product (PROBIOTIC ADVANCED PO) Take by mouth daily       Ascorbic Acid (VITAMIN C) 1000 MG tablet Take 1,000 mg by mouth daily      Multiple Vitamin (MULTIVITAMIN) capsule Take 1 capsule by mouth daily       aspirin 81 MG chewable tablet Take 1 tablet by mouth daily (Patient not taking: Reported on 2020) 30 tablet 3    nitroGLYCERIN (NITROSTAT) 0.4 MG SL tablet up to max of 3 total doses. If no relief after 1 dose, call 911. (Patient not taking: Reported on 2020) 25 tablet 3     No current facility-administered medications on file prior to visit. Allergies: Patient has no known allergies. Family History   Problem Relation Age of Onset   [place] Cancer Mother 52        large cell nonkeratinizing squamous cell of the pancreas                          COPD Father     Cancer Father         Hodgkin's disease head, face, neck    Stroke Father         ischemic       Social History     Tobacco Use    Smoking status: Former Smoker     Packs/day: 1.00     Years: 20.00     Pack years: 20.00     Types: Cigarettes     Last attempt to quit: 1992     Years since quittin.3    Smokeless tobacco: Never Used   Substance Use Topics    Alcohol use: Yes     Alcohol/week: 2.0 standard drinks     Types: 2 Glasses of wine per week     Comment: DAILY         Review of Systems   Constitutional: Negative for activity change, appetite change and fatigue. HENT: Negative for congestion, rhinorrhea and sore throat. Eyes: Negative for pain, redness and visual disturbance. Respiratory: Negative for cough, shortness of breath and wheezing. Cardiovascular: Negative for chest pain, palpitations and leg swelling. Gastrointestinal: Positive for anal bleeding. Negative for abdominal distention, abdominal pain, constipation and nausea. Endocrine: Negative for cold intolerance, heat intolerance and polyuria. Genitourinary: Negative for dysuria, frequency and hematuria. Musculoskeletal: Negative for arthralgias, back pain and myalgias.    Skin: Negative for rash and wound. Neurological: Negative for dizziness, numbness and headaches. Psychiatric/Behavioral: Negative for agitation and confusion. The patient is not nervous/anxious. Objective:   Physical Exam  Vitals signs reviewed. Exam conducted with a chaperone present. Constitutional:       General: He is not in acute distress. Appearance: Normal appearance. HENT:      Head: Normocephalic. Mouth/Throat:      Mouth: Mucous membranes are moist.   Eyes:      General: No scleral icterus. Extraocular Movements: Extraocular movements intact. Pupils: Pupils are equal, round, and reactive to light. Cardiovascular:      Rate and Rhythm: Normal rate and regular rhythm. Heart sounds: No murmur. Pulmonary:      Effort: Pulmonary effort is normal. No respiratory distress. Breath sounds: No wheezing. Abdominal:      General: There is no distension. Palpations: Abdomen is soft. Tenderness: There is no abdominal tenderness. Genitourinary:     Rectum: External hemorrhoid (large external hemorrhoid, appeared edematous, small opening on the hemorrhoid consistent with self-drainage, no appearance of ischemia) present. No anal fissure. Musculoskeletal: Normal range of motion. General: No swelling or deformity. Skin:     General: Skin is warm and dry. Coloration: Skin is not jaundiced. Neurological:      General: No focal deficit present. Mental Status: He is alert and oriented to person, place, and time. Cranial Nerves: No cranial nerve deficit (grossly intact). Psychiatric:         Mood and Affect: Mood normal.         Behavior: Behavior normal.         Assessment:      75year old male with a large thrombosed external hemorrhoid      Plan:      Discussed with the patient to continue with symptomatic therapy, including suppositories, topicals, antiinflammatories, and sitz baths.  The hemorrhoid has drained on its own to relieve pressure and there is no ischemia. In this setting, would be better to let the swelling continue to improve on its own, and then down the road excise the smaller area in a couple weeks. At that time we can also check for more interna hemorrhoid disease and excise it at the same time. The patient expressed understanding and would like to proceed with this plan.         Apple Ba MD

## 2020-08-03 DIAGNOSIS — I10 ESSENTIAL HYPERTENSION: ICD-10-CM

## 2020-08-03 DIAGNOSIS — Z12.5 ENCOUNTER FOR SCREENING FOR MALIGNANT NEOPLASM OF PROSTATE: ICD-10-CM

## 2020-08-03 DIAGNOSIS — E78.2 MIXED HYPERLIPIDEMIA: Chronic | ICD-10-CM

## 2020-08-03 DIAGNOSIS — C61 PROSTATE CANCER (HCC): ICD-10-CM

## 2020-08-03 LAB
ALBUMIN SERPL-MCNC: 4.5 G/DL (ref 3.5–5.2)
ALP BLD-CCNC: 20 U/L (ref 40–130)
ALT SERPL-CCNC: 19 U/L (ref 5–41)
ANION GAP SERPL CALCULATED.3IONS-SCNC: 12 MMOL/L (ref 7–19)
AST SERPL-CCNC: 22 U/L (ref 5–40)
BASOPHILS ABSOLUTE: 0 K/UL (ref 0–0.2)
BASOPHILS RELATIVE PERCENT: 0.7 % (ref 0–1)
BILIRUB SERPL-MCNC: <0.2 MG/DL (ref 0.2–1.2)
BILIRUBIN URINE: NEGATIVE
BLOOD, URINE: NEGATIVE
BUN BLDV-MCNC: 28 MG/DL (ref 8–23)
CALCIUM SERPL-MCNC: 9.5 MG/DL (ref 8.8–10.2)
CHLORIDE BLD-SCNC: 104 MMOL/L (ref 98–111)
CHOLESTEROL, TOTAL: 166 MG/DL (ref 160–199)
CLARITY: CLEAR
CO2: 22 MMOL/L (ref 22–29)
COLOR: YELLOW
CREAT SERPL-MCNC: 1.3 MG/DL (ref 0.5–1.2)
EOSINOPHILS ABSOLUTE: 0.1 K/UL (ref 0–0.6)
EOSINOPHILS RELATIVE PERCENT: 1.7 % (ref 0–5)
GFR AFRICAN AMERICAN: >59
GFR NON-AFRICAN AMERICAN: 55
GLUCOSE BLD-MCNC: 95 MG/DL (ref 74–109)
GLUCOSE URINE: NEGATIVE MG/DL
HCT VFR BLD CALC: 43.1 % (ref 42–52)
HDLC SERPL-MCNC: 28 MG/DL (ref 55–121)
HEMOGLOBIN: 14 G/DL (ref 14–18)
IMMATURE GRANULOCYTES #: 0 K/UL
KETONES, URINE: NEGATIVE MG/DL
LDL CHOLESTEROL CALCULATED: 91 MG/DL
LEUKOCYTE ESTERASE, URINE: NEGATIVE
LYMPHOCYTES ABSOLUTE: 1.2 K/UL (ref 1.1–4.5)
LYMPHOCYTES RELATIVE PERCENT: 21.4 % (ref 20–40)
MCH RBC QN AUTO: 31.1 PG (ref 27–31)
MCHC RBC AUTO-ENTMCNC: 32.5 G/DL (ref 33–37)
MCV RBC AUTO: 95.8 FL (ref 80–94)
MONOCYTES ABSOLUTE: 0.5 K/UL (ref 0–0.9)
MONOCYTES RELATIVE PERCENT: 9.5 % (ref 0–10)
NEUTROPHILS ABSOLUTE: 3.6 K/UL (ref 1.5–7.5)
NEUTROPHILS RELATIVE PERCENT: 66.1 % (ref 50–65)
NITRITE, URINE: NEGATIVE
PDW BLD-RTO: 12.1 % (ref 11.5–14.5)
PH UA: 6.5 (ref 5–8)
PLATELET # BLD: 239 K/UL (ref 130–400)
PMV BLD AUTO: 10.4 FL (ref 9.4–12.4)
POTASSIUM SERPL-SCNC: 4.8 MMOL/L (ref 3.5–5)
PROSTATE SPECIFIC ANTIGEN: 7.62 NG/ML (ref 0–4)
PROTEIN UA: NEGATIVE MG/DL
RBC # BLD: 4.5 M/UL (ref 4.7–6.1)
SODIUM BLD-SCNC: 138 MMOL/L (ref 136–145)
SPECIFIC GRAVITY UA: 1.02 (ref 1–1.03)
TOTAL PROTEIN: 6.9 G/DL (ref 6.6–8.7)
TRIGL SERPL-MCNC: 236 MG/DL (ref 0–149)
UROBILINOGEN, URINE: 0.2 E.U./DL
WBC # BLD: 5.4 K/UL (ref 4.8–10.8)

## 2020-08-06 ENCOUNTER — TELEPHONE (OUTPATIENT)
Dept: PRIMARY CARE CLINIC | Age: 65
End: 2020-08-06

## 2020-08-26 RX ORDER — IRBESARTAN 150 MG/1
TABLET ORAL
Qty: 135 TABLET | Refills: 2 | Status: SHIPPED | OUTPATIENT
Start: 2020-08-26 | End: 2020-10-22 | Stop reason: SDUPTHER

## 2020-09-08 ENCOUNTER — OFFICE VISIT (OUTPATIENT)
Dept: NEUROLOGY | Age: 65
End: 2020-09-08
Payer: MEDICARE

## 2020-09-08 VITALS
WEIGHT: 218 LBS | HEIGHT: 72 IN | SYSTOLIC BLOOD PRESSURE: 130 MMHG | HEART RATE: 74 BPM | DIASTOLIC BLOOD PRESSURE: 90 MMHG | BODY MASS INDEX: 29.53 KG/M2

## 2020-09-08 PROBLEM — G47.33 SLEEP APNEA, OBSTRUCTIVE: Status: ACTIVE | Noted: 2020-09-08

## 2020-09-08 PROCEDURE — 1123F ACP DISCUSS/DSCN MKR DOCD: CPT | Performed by: PSYCHIATRY & NEUROLOGY

## 2020-09-08 PROCEDURE — 3017F COLORECTAL CA SCREEN DOC REV: CPT | Performed by: PSYCHIATRY & NEUROLOGY

## 2020-09-08 PROCEDURE — 99213 OFFICE O/P EST LOW 20 MIN: CPT | Performed by: PSYCHIATRY & NEUROLOGY

## 2020-09-08 PROCEDURE — G8427 DOCREV CUR MEDS BY ELIG CLIN: HCPCS | Performed by: PSYCHIATRY & NEUROLOGY

## 2020-09-08 PROCEDURE — 4040F PNEUMOC VAC/ADMIN/RCVD: CPT | Performed by: PSYCHIATRY & NEUROLOGY

## 2020-09-08 PROCEDURE — G8417 CALC BMI ABV UP PARAM F/U: HCPCS | Performed by: PSYCHIATRY & NEUROLOGY

## 2020-09-08 PROCEDURE — 1036F TOBACCO NON-USER: CPT | Performed by: PSYCHIATRY & NEUROLOGY

## 2020-09-08 NOTE — PROGRESS NOTES
Cancer Mother 52        large cell nonkeratinizing squamous cell of the pancreas                          COPD Father     Cancer Father         Hodgkin's disease head, face, neck    Stroke Father         ischemic       Social History  Kristi Hall is , lives in [address], and is retired. Medications:  Current Outpatient Medications   Medication Sig Dispense Refill    VITAMIN D PO Take by mouth Patient uncertain of dosing.  irbesartan (AVAPRO) 150 MG tablet TAKE 1/2 TABLET EVERY MORNING AND TAKE 1 TABLET AT BEDTIME 135 tablet 2    atorvastatin (LIPITOR) 20 MG tablet TAKE 1 TABLET EVERY EVENING 90 tablet 3    fenofibrate (TRIGLIDE) 160 MG tablet TAKE 1 TABLET EVERY DAY 90 tablet 3    Zinc Sulfate (ZINC 15 PO) Take by mouth      Probiotic Product (PROBIOTIC ADVANCED PO) Take by mouth daily       Ascorbic Acid (VITAMIN C) 1000 MG tablet Take 1,000 mg by mouth daily      Multiple Vitamin (MULTIVITAMIN) capsule Take 1 capsule by mouth daily        No current facility-administered medications for this visit. Allergies:   Allergies as of 09/08/2020    (No Known Allergies)       Review of Systems:  Constitutional: negative for - chills and fever  Eyes:  negative for - visual disturbance and photophobia  HENMT: negative for - headaches and sinus pain  Respiratory: negative for - cough, hemoptysis, and shortness of breath  Cardiovascular: negative for - chest pain and palpitations  Gastrointestinal: negative for - blood in stools, constipation, diarrhea, nausea, and vomiting  Genito-Urinary: negative for - hematuria, urinary frequency, urinary urgency, and urinary retention  Musculoskeletal: negative for - joint pain, joint stiffness, and joint swelling  Hematological and Lymphatic: negative for - bleeding problems, abnormal bruising, and swollen lymph nodes  Endocrine:  negative for - polydipsia and polyphagia  Allergy/Immunology:  negative for - rhinorrhea, sinus congestion, hives  Integument:  negative for - negative for - rash, change in moles, new or changing lesions  Psychological: negative for - anxiety and depression  Neurological: negative for - memory loss, numbness/tingling, and weakness     PHYSICAL EXAMINATION:  Vitals:  BP (!) 130/90   Pulse 74   Ht 6' (1.829 m)   Wt 218 lb (98.9 kg)   BMI 29.57 kg/m²   General appearance:  Alert, well developed, well nourished, in no distress  HEENT:  normocephalic, atraumatic, sclera appear normal, no nasal abnormalities, no rhinorrhea, Ears appear normal, oral mucous membranes are moist without erythema, trachea midline, thyroid is normal, no lymphadenopathy or neck mass. II (soft palate, uvula, fauces visible). Cardiovascular:  Regular rate and rhythm without murmer. No peripheral edema, No cyanosis or clubbing. No carotid bruits. Pulmonary:  Lungs are clear to auscultation. Breathing appears normal, good expansion, normal effort without use of accessory muscles  Musculoskeletal:  Joints are normal.  No splints, slings, or casts. Spine appears normal with normal posture and range of motion. Integument:  No rash, erythema, or pallor  Psychiatric:  Mood, affect, and behavior appear normal      NEUROLOGIC EXAMINATION:  Mental Status:  alert, oriented to person, place, and time. Speech:  Clear without dysarthria or dysphonia  Language:  Fluent without aphasia  Cranial Nerves:   II Visual fields are full to confrontation   III,IV, VI Extraocular movements are full   VII Facial movements are symmetrical without weakness   VIII Hearing is intact   XII No tongue atrophy or fasciculations. Normal tongue protrusion. No tongue weakness  Motor:  Normal strength in both upper and lower extremities. Normal muscle tone and bulk. Deep tendon reflexes are intact and symmetrical in both upper and lower extremities. Hubbard's signs are absent bilaterally. There is no ankle clonus on either side.   Coordination:  Rapid alternating movements are normal in both upper and lower extremities. Finger to nose testing is unimpaired bilaterally. Gait:  Normal station and gait. Pertinent Diagnostic Studies:  CPAP download shows that he is 100% compliant with auto CPAP use at 6cm to 20cm with residual AHI of 0.7. Assessment:       ICD-10-CM    1. Sleep apnea, obstructive  G47.33      I discussed the diagnosis of obstructive sleep apnea with Kristi Page including the pathophysiology (namely the mechanism of breathing and obstruction of upper airway, interruptions of sleep, hypoxemia, hypercapnia, and results of repetitive sympathetic activation), risks, evaluation, and treatment options. I discussed the risks of driving when drowsy and advised that Kristi Page not drive when drowsy and avoid sedating medications and respiratory suppressants. Plan:   1. Continue CPAP use during sleep  2.  FU in a year      Electronically signed by Nicholas Kahn MD on 9/8/20

## 2020-09-23 ENCOUNTER — OFFICE VISIT (OUTPATIENT)
Dept: PRIMARY CARE CLINIC | Age: 65
End: 2020-09-23
Payer: MEDICARE

## 2020-09-23 VITALS
HEART RATE: 75 BPM | WEIGHT: 221 LBS | OXYGEN SATURATION: 99 % | HEIGHT: 72 IN | TEMPERATURE: 97.3 F | DIASTOLIC BLOOD PRESSURE: 88 MMHG | SYSTOLIC BLOOD PRESSURE: 130 MMHG | BODY MASS INDEX: 29.93 KG/M2

## 2020-09-23 PROCEDURE — 90732 PPSV23 VACC 2 YRS+ SUBQ/IM: CPT | Performed by: FAMILY MEDICINE

## 2020-09-23 PROCEDURE — 3017F COLORECTAL CA SCREEN DOC REV: CPT | Performed by: FAMILY MEDICINE

## 2020-09-23 PROCEDURE — 90694 VACC AIIV4 NO PRSRV 0.5ML IM: CPT | Performed by: FAMILY MEDICINE

## 2020-09-23 PROCEDURE — G0009 ADMIN PNEUMOCOCCAL VACCINE: HCPCS | Performed by: FAMILY MEDICINE

## 2020-09-23 PROCEDURE — G0438 PPPS, INITIAL VISIT: HCPCS | Performed by: FAMILY MEDICINE

## 2020-09-23 PROCEDURE — 1123F ACP DISCUSS/DSCN MKR DOCD: CPT | Performed by: FAMILY MEDICINE

## 2020-09-23 PROCEDURE — 4040F PNEUMOC VAC/ADMIN/RCVD: CPT | Performed by: FAMILY MEDICINE

## 2020-09-23 PROCEDURE — G0008 ADMIN INFLUENZA VIRUS VAC: HCPCS | Performed by: FAMILY MEDICINE

## 2020-09-23 ASSESSMENT — LIFESTYLE VARIABLES
HOW OFTEN DURING THE LAST YEAR HAVE YOU BEEN UNABLE TO REMEMBER WHAT HAPPENED THE NIGHT BEFORE BECAUSE YOU HAD BEEN DRINKING: 0
AUDIT TOTAL SCORE: 2
HOW OFTEN DO YOU HAVE SIX OR MORE DRINKS ON ONE OCCASION: 0
HOW MANY STANDARD DRINKS CONTAINING ALCOHOL DO YOU HAVE ON A TYPICAL DAY: 0
HAS A RELATIVE, FRIEND, DOCTOR, OR ANOTHER HEALTH PROFESSIONAL EXPRESSED CONCERN ABOUT YOUR DRINKING OR SUGGESTED YOU CUT DOWN: 0
HOW OFTEN DO YOU HAVE A DRINK CONTAINING ALCOHOL: 2
HOW OFTEN DURING THE LAST YEAR HAVE YOU FAILED TO DO WHAT WAS NORMALLY EXPECTED FROM YOU BECAUSE OF DRINKING: 0
HAVE YOU OR SOMEONE ELSE BEEN INJURED AS A RESULT OF YOUR DRINKING: 0
HOW OFTEN DURING THE LAST YEAR HAVE YOU FOUND THAT YOU WERE NOT ABLE TO STOP DRINKING ONCE YOU HAD STARTED: 0
HOW OFTEN DURING THE LAST YEAR HAVE YOU NEEDED AN ALCOHOLIC DRINK FIRST THING IN THE MORNING TO GET YOURSELF GOING AFTER A NIGHT OF HEAVY DRINKING: 0
HOW OFTEN DURING THE LAST YEAR HAVE YOU HAD A FEELING OF GUILT OR REMORSE AFTER DRINKING: 0
AUDIT-C TOTAL SCORE: 2

## 2020-09-23 ASSESSMENT — PATIENT HEALTH QUESTIONNAIRE - PHQ9
2. FEELING DOWN, DEPRESSED OR HOPELESS: 0
SUM OF ALL RESPONSES TO PHQ QUESTIONS 1-9: 0
SUM OF ALL RESPONSES TO PHQ QUESTIONS 1-9: 0
SUM OF ALL RESPONSES TO PHQ9 QUESTIONS 1 & 2: 0
1. LITTLE INTEREST OR PLEASURE IN DOING THINGS: 0

## 2020-09-23 NOTE — PROGRESS NOTES
Olivier Lizama is a 75 y.o. male who presents today for   Chief Complaint   Patient presents with    Medicare AWV       HPI  Patient is here for     Last four PSA's:  02/14/2019 - 2.60  08/07/2019 - 2.76  02/06/2020 - 4.87  06/08/2020 - 6.84     Next tx regimen will be hormone based. Seeing Dr. Darryl Joiner in [place]. No change in PMH, family, social, or surgical history unless mentioned above. Review of Systems    Past Medical History:   Diagnosis Date    Arthritis     OSTEO    CKD (chronic kidney disease) stage 3, GFR 30-59 ml/min ([facility]) 8/10/2017    Hyperglycemia 8/10/2017    Hyperlipidemia     Hypertension     Mixed hyperlipidemia 8/10/2017    Prostate carcinoma ([facility] [address] 8/10/2017    Sleep apnea, obstructive 9/8/2020       Current Outpatient Medications   Medication Sig Dispense Refill    VITAMIN D PO Take by mouth Patient uncertain of dosing.  irbesartan (AVAPRO) 150 MG tablet TAKE 1/2 TABLET EVERY MORNING AND TAKE 1 TABLET AT BEDTIME 135 tablet 2    atorvastatin (LIPITOR) 20 MG tablet TAKE 1 TABLET EVERY EVENING 90 tablet 3    fenofibrate (TRIGLIDE) 160 MG tablet TAKE 1 TABLET EVERY DAY 90 tablet 3    Zinc Sulfate (ZINC 15 PO) Take by mouth      Probiotic Product (PROBIOTIC ADVANCED PO) Take by mouth daily       Ascorbic Acid (VITAMIN C) 1000 MG tablet Take 1,000 mg by mouth daily      Multiple Vitamin (MULTIVITAMIN) capsule Take 1 capsule by mouth daily        No current facility-administered medications for this visit.         No Known Allergies    Past Surgical History:   Procedure Laterality Date    KNEE SURGERY Left     knee ligamentous reconstruction with residual degenerative arthritis    PARATHYROIDECTOMY Left 09/16/2016    parathyroid resection single tumor removal left inferior/adenoma with associated hyperparathyroidism     PROSTATECTOMY  2012    TONSILLECTOMY      TOTAL KNEE ARTHROPLASTY Left 5/17/2017    KNEE TOTAL ARTHROPLASTY performed by Dayna Castillo MD at [address] [place] [address] Social History     Tobacco Use    Smoking status: Former Smoker     Packs/day: 1.00     Years: 20.00     Pack years: 20.00     Types: Cigarettes     Last attempt to quit: 1992     Years since quittin.4    Smokeless tobacco: Never Used   Substance Use Topics    Alcohol use: Yes     Alcohol/week: 2.0 standard drinks     Types: 2 Glasses of wine per week     Comment: DAILY    Drug use: No       Family History   Problem Relation Age of Onset    Cancer Mother 52        large cell nonkeratinizing squamous cell of the pancreas                          COPD Father     Cancer Father         Hodgkin's disease head, face, neck    Stroke Father         ischemic       /88   Pulse 75   Temp 97.3 °F (36.3 °C)   Ht 6' (1.829 m)   Wt 221 lb (100.2 kg)   SpO2 99%   BMI 29.97 kg/m²     Physical Exam    Assessment:  No diagnosis found. Plan:   ***  No orders of the defined types were placed in this encounter. No orders of the defined types were placed in this encounter. There are no discontinued medications. There are no Patient Instructions on file for this visit. Patient given educational handouts and has had all questions answered. Patient voices understanding and agrees to plans along with risks and benefits of plan. Patient isinstructed to continue prior meds, diet, and exercise plans unless instructed otherwise. Patient agrees to follow up as instructed and sooner if needed. Patient agrees to go to ER if condition becomes emergent. Notesmay be completed with dictation device and spelling errors may occur. Materials may be copied and pasted from a notepad outside of EMR, all of which, I, Dr. Darryl Boyd MD, take sole intellectual ownership of and have approved adding to my note. No follow-ups on file.

## 2020-09-23 NOTE — PATIENT INSTRUCTIONS
Personalized Preventive Plan for Shravan Mccain - 9/23/2020  Medicare offers a range of preventive health benefits. Some of the tests and screenings are paid in full while other may be subject to a deductible, co-insurance, and/or copay. Some of these benefits include a comprehensive review of your medical history including lifestyle, illnesses that may run in your family, and various assessments and screenings as appropriate. After reviewing your medical record and screening and assessments performed today your provider may have ordered immunizations, labs, imaging, and/or referrals for you. A list of these orders (if applicable) as well as your Preventive Care list are included within your After Visit Summary for your review. Other Preventive Recommendations:    · A preventive eye exam performed by an eye specialist is recommended every 1-2 years to screen for glaucoma; cataracts, macular degeneration, and other eye disorders. · A preventive dental visit is recommended every 6 months. · Try to get at least 150 minutes of exercise per week or 10,000 steps per day on a pedometer . · Order or download the FREE \"Exercise & Physical Activity: Your Everyday Guide\" from [employer]. Call 2-135.852.3145 or search [employer] online. · You need 9916-8697 mg of calcium and 0108-7261 IU of vitamin D per day. It is possible to meet your calcium requirement with diet alone, but a vitamin D supplement is usually necessary to meet this goal.  · When exposed to the sun, use a sunscreen that protects against both UVA and UVB radiation with an SPF of 30 or greater. Reapply every 2 to 3 hours or after sweating, drying off with a towel, or swimming. · Always wear a seat belt when traveling in a car. Always wear a helmet when riding a bicycle or motorcycle.

## 2020-09-23 NOTE — PROGRESS NOTES
Medicare Annual Wellness Visit  Name: Hui Caballero Date: 2020   MRN: [id number] Sex: Male   Age: 75 y.o. Ethnicity: Non-/Non    : 1955 Race: Lupe Jaffe is here for Medicare AWV    Screenings for behavioral, psychosocial and functional/safety risks, and cognitive dysfunction are all negative except as indicated below. These results, as well as other patient data from the [address] [facility] [place] [address] form, are documented in Flowsheets linked to this Encounter. No Known Allergies    Prior to Visit Medications    Medication Sig Taking? Authorizing Provider   VITAMIN D PO Take by mouth Patient uncertain of dosing.  Yes Historical Provider, MD   irbesartan (AVAPRO) 150 MG tablet TAKE 1/2 TABLET EVERY MORNING AND TAKE 1 TABLET AT BEDTIME Yes ZAY Whitfield   atorvastatin (LIPITOR) 20 MG tablet TAKE 1 TABLET EVERY EVENING Yes ZAY Whitfield   fenofibrate (TRIGLIDE) 160 MG tablet TAKE 1 TABLET EVERY DAY Yes ZAY Whitfield   Zinc Sulfate (ZINC 15 PO) Take by mouth Yes Historical Provider, MD   Probiotic Product (PROBIOTIC ADVANCED PO) Take by mouth daily  Yes Historical Provider, MD   Ascorbic Acid (VITAMIN C) 1000 MG tablet Take 1,000 mg by mouth daily Yes Historical Provider, MD   Multiple Vitamin (MULTIVITAMIN) capsule Take 1 capsule by mouth daily  Yes Historical Provider, MD       Past Medical History:   Diagnosis Date    Arthritis     OSTEO    CKD (chronic kidney disease) stage 3, GFR 30-59 ml/min ([address] 8/10/2017    Hyperglycemia 8/10/2017    Hyperlipidemia     Hypertension     Mixed hyperlipidemia 8/10/2017    Prostate carcinoma ([address] 8/10/2017    Sleep apnea, obstructive 2020       Past Surgical History:   Procedure Laterality Date    KNEE SURGERY Left     knee ligamentous reconstruction with residual degenerative arthritis    PARATHYROIDECTOMY Left 2016    parathyroid resection single tumor removal left educational materials provided to promote increased physical activity    Safety:  Safety  Do you have working smoke detectors?: Yes  Have all throw rugs been removed or fastened?: (!) No  Do you have non-slip mats or surfaces in all bathtubs/showers?: Yes  Do all of your stairways have a railing or banister?: Yes  Are your doorways, halls and stairs free of clutter?: Yes  Do you always fasten your seatbelt when you are in a car?: Yes  Safety Interventions:  · Home safety tips provided    Personalized Preventive Plan   Current Health Maintenance Status  Immunization History   Administered Date(s) Administered    Influenza Vaccine, unspecified formulation 10/17/2018    Influenza Virus Vaccine 11/02/2012    Influenza, Quadv, IM, PF (6 mo and older Fluzone, Flulaval, Fluarix, and 3 yrs and older Afluria) 10/30/2017, 10/31/2019    Pneumococcal Conjugate 13-valent (Pollyann Andre) 05/01/2019    Tdap (Boostrix, Adacel) 11/12/2018        Health Maintenance   Topic Date Due    Shingles Vaccine (1 of 2) 12/12/2003    Annual Wellness Visit (AWV)  05/29/2019    Pneumococcal 65+ years Vaccine (2 of 2 - PPSV23) 05/01/2020    A1C test (Diabetic or Prediabetic)  08/31/2020    Flu vaccine (1) 09/01/2020    Statin Therapy  07/15/2021    Lipid screen  08/03/2021    Potassium monitoring  08/03/2021    Creatinine monitoring  08/03/2021    PSA counseling  08/03/2021    Colon cancer screen colonoscopy  04/15/2024    DTaP/Tdap/Td vaccine (2 - Td) 11/12/2028    Hepatitis C screen  Completed    Hepatitis A vaccine  Aged Out    Hepatitis B vaccine  Aged Out    Hib vaccine  Aged Out    Meningococcal (ACWY) vaccine  Aged Out     Recommendations for [place] Due: see orders and patient instructions/AVS.  . Recommended screening schedule for the next 5-10 years is provided to the patient in written form: see Patient Instructions/AVS.    Sushil Lakhani was seen today for medicare awv.     Diagnoses and all orders for this visit:    Routine general medical examination at a health care facility    Peripheral thrombosis ([facility] [address]    Hyperparathyroidism, primary ([facility] [address]    Prostate cancer ([facility] [address]    Other orders  -     PNEUMOVAX 23 subcutaneous/IM (Pneumococcal polysaccharide vaccine 23-valent >= 1yo)  -     INFLUENZA, QUADV, ADJUVANTED, 65 YRS =, IM, PF, PREFILL SYR, 0.5ML (FLUAD)

## 2020-10-05 DIAGNOSIS — C61 PROSTATE CANCER (HCC): ICD-10-CM

## 2020-10-05 DIAGNOSIS — Z12.5 ENCOUNTER FOR SCREENING FOR MALIGNANT NEOPLASM OF PROSTATE: ICD-10-CM

## 2020-10-05 LAB — PROSTATE SPECIFIC ANTIGEN: 7.57 NG/ML (ref 0–4)

## 2020-10-07 ENCOUNTER — TELEPHONE (OUTPATIENT)
Dept: PRIMARY CARE CLINIC | Age: 65
End: 2020-10-07

## 2020-10-20 ENCOUNTER — TELEPHONE (OUTPATIENT)
Dept: PRIMARY CARE CLINIC | Age: 65
End: 2020-10-20

## 2020-10-20 NOTE — TELEPHONE ENCOUNTER
Patient sees urologist in [address] [place] [address] Dr. Fink Grade. . Results were given 10/07

## 2020-10-21 ENCOUNTER — TELEPHONE (OUTPATIENT)
Dept: CARDIOLOGY | Age: 65
End: 2020-10-21

## 2020-10-22 ENCOUNTER — OFFICE VISIT (OUTPATIENT)
Dept: CARDIOLOGY | Age: 65
End: 2020-10-22
Payer: MEDICARE

## 2020-10-22 VITALS
OXYGEN SATURATION: 97 % | HEIGHT: 72 IN | BODY MASS INDEX: 28.71 KG/M2 | WEIGHT: 212 LBS | HEART RATE: 74 BPM | DIASTOLIC BLOOD PRESSURE: 86 MMHG | SYSTOLIC BLOOD PRESSURE: 144 MMHG

## 2020-10-22 PROCEDURE — 1036F TOBACCO NON-USER: CPT | Performed by: NURSE PRACTITIONER

## 2020-10-22 PROCEDURE — G8484 FLU IMMUNIZE NO ADMIN: HCPCS | Performed by: NURSE PRACTITIONER

## 2020-10-22 PROCEDURE — 3017F COLORECTAL CA SCREEN DOC REV: CPT | Performed by: NURSE PRACTITIONER

## 2020-10-22 PROCEDURE — 99214 OFFICE O/P EST MOD 30 MIN: CPT | Performed by: NURSE PRACTITIONER

## 2020-10-22 PROCEDURE — G8417 CALC BMI ABV UP PARAM F/U: HCPCS | Performed by: NURSE PRACTITIONER

## 2020-10-22 PROCEDURE — 4040F PNEUMOC VAC/ADMIN/RCVD: CPT | Performed by: NURSE PRACTITIONER

## 2020-10-22 PROCEDURE — 1123F ACP DISCUSS/DSCN MKR DOCD: CPT | Performed by: NURSE PRACTITIONER

## 2020-10-22 PROCEDURE — 93000 ELECTROCARDIOGRAM COMPLETE: CPT | Performed by: NURSE PRACTITIONER

## 2020-10-22 PROCEDURE — G8427 DOCREV CUR MEDS BY ELIG CLIN: HCPCS | Performed by: NURSE PRACTITIONER

## 2020-10-22 RX ORDER — IRBESARTAN 150 MG/1
150 TABLET ORAL DAILY
Qty: 90 TABLET | Refills: 3 | Status: SHIPPED | OUTPATIENT
Start: 2020-10-22 | End: 2020-11-20 | Stop reason: SDUPTHER

## 2020-10-22 RX ORDER — INDAPAMIDE 1.25 MG/1
1.25 TABLET, FILM COATED ORAL EVERY MORNING
Qty: 90 TABLET | Refills: 3 | Status: SHIPPED | OUTPATIENT
Start: 2020-10-22 | End: 2020-11-20

## 2020-10-22 ASSESSMENT — ENCOUNTER SYMPTOMS
COUGH: 0
CHEST TIGHTNESS: 0
SHORTNESS OF BREATH: 0
WHEEZING: 0
SORE THROAT: 0
EYES NEGATIVE: 1
GASTROINTESTINAL NEGATIVE: 1

## 2020-10-22 NOTE — PROGRESS NOTES
[facility] Cardiology   Established Patient Office Visit  [address] [facility] [place]  [phone]        OFFICE VISIT:  10/22/2020    Jaspreetfort: 1955    Reason For Visit:  Amanda Kinney is a 75 y.o. male who is here for Follow-up (No cardiac complaints)    1. Essential hypertension    2. Hyperlipidemia, unspecified hyperlipidemia type    3. Medication refill        Patient with a history of hypertension and hyperlipidemia. He is a patient of Dr. Calista Dai. Patient presents to clinic today for 6-month follow-up. Patient denies any complaints of chest pain, pressure or tightness. There is no shortness of breath, orthopnea or PND. Patient denies any lightheadedness, dizziness or syncope. DATA:  8/31/2019 Lexiscan  Summary impressions:    Normal study    Signed by Dr Johnny Jules on 9/4/2019 12:01 PM          8/31/2019 2D echo  Summary   Mitral valve leaflets are mildly thickened with preserved leaflet mobility. Trace mitral regurgitation. Mildly thickened aortic valve leaflets with preserved leaflet mobility. Aortic valve appears to be tricuspid. No evidence of aortic stenosis or aortic regurgitation. Trivial tricuspid regurgitation with estimated RVSP of 31 mm Hg. Normal left ventricular size and function. Mild concentric left ventricular hypertrophy. Left ventricular ejection fraction is estimated at 53%. E/A flow reversal noted. Suggestive of diastolic dysfunction.       Signature      ----------------------------------------------------------------   Electronically signed by Yumiko Young MD(Interpreting   physician) on 09/01/2019 05:26 PM   ----------------------------------------------------------------          Subjective    Alexis Beverly is a 75 y.o. male with the following history as recorded in [employer]:    Patient Active Problem List    Diagnosis Date Noted    Sleep apnea, obstructive 09/08/2020    Elevated serum creatinine 10/07/2019    History of acute pancreatitis 10/07/2019    Left upper quadrant pain 10/07/2019    Abnormal finding of blood chemistry  10/07/2019    Hypertriglyceridemia 10/07/2019    Need for prophylactic vaccination and inoculation against varicella 10/07/2019    Constipation 10/07/2019    Abdominal aortic aneurysm (AAA) without rupture ([facility] [address] 10/07/2019    Per[facility] 10/07/2019    Bilateral inguinal hernia without obstruction or gangrene 10/07/2019    History of prostatectomy 10/07/2019    Elevated d-dimer 10/07/2019    Medication refill 09/16/2019    Chest pain 08/31/2019    Pulmonary nodule 08/31/2019    Right nephrolithiasis 08/31/2019    Prostate cancer ([facility] 05/01/2019    Actinic keratosis 04/24/2018    Hypertension 10/23/2017    CKD (chronic kidney disease) stage 3, GFR 30-59 ml/min 08/10/2017    Hyperparathyroidism, primary ([facility] 08/10/2017    Mixed hyperlipidemia 08/10/2017    Hyperglycemia 08/10/2017     Current Outpatient Medications   Medication Sig Dispense Refill    indapamide (LOZOL) 1.25 MG tablet Take 1 tablet by mouth every morning 90 tablet 3    irbesartan (AVAPRO) 150 MG tablet Take 1 tablet by mouth daily TAKE 1/2 TABLET EVERY MORNING AND TAKE 1 TABLET AT BEDTIME 90 tablet 3    VITAMIN D PO Take by mouth Patient uncertain of dosing.  atorvastatin (LIPITOR) 20 MG tablet TAKE 1 TABLET EVERY EVENING 90 tablet 3    fenofibrate (TRIGLIDE) 160 MG tablet TAKE 1 TABLET EVERY DAY 90 tablet 3    Zinc Sulfate (ZINC 15 PO) Take by mouth      Probiotic Product (PROBIOTIC ADVANCED PO) Take by mouth daily       Ascorbic Acid (VITAMIN C) 1000 MG tablet Take 1,000 mg by mouth daily      Multiple Vitamin (MULTIVITAMIN) capsule Take 1 capsule by mouth daily        No current facility-administered medications for this visit. Allergies: Patient has no known allergies.   Past Medical History:   Diagnosis Date    Arthritis     OSTEO    CKD (chronic kidney disease) stage 3, GFR 30-59 ml/min 8/10/2017    Hyperglycemia 8/10/2017    Hyperlipidemia     Hypertension     Mixed hyperlipidemia 8/10/2017    Prostate carcinoma ([facility] [address] 8/10/2017    Sleep apnea, obstructive 2020     Past Surgical History:   Procedure Laterality Date    KNEE SURGERY Left     knee ligamentous reconstruction with residual degenerative arthritis    PARATHYROIDECTOMY Left 2016    parathyroid resection single tumor removal left inferior/adenoma with associated hyperparathyroidism     PROSTATECTOMY  2012    TONSILLECTOMY      TOTAL KNEE ARTHROPLASTY Left 2017    KNEE TOTAL ARTHROPLASTY performed by Malina Ahuja MD at [facility] History   Problem Relation Age of Onset    Cancer Mother 52        large cell nonkeratinizing squamous cell of the pancreas                          COPD Father     Cancer Father         Hodgkin's disease head, face, neck    Stroke Father         ischemic     Social History     Tobacco Use    Smoking status: Former Smoker     Packs/day: 1.00     Years: 20.00     Pack years: 20.00     Types: Cigarettes     Last attempt to quit: 1992     Years since quittin.5    Smokeless tobacco: Never Used   Substance Use Topics    Alcohol use: Yes     Alcohol/week: 2.0 standard drinks     Types: 2 Glasses of wine per week     Comment: DAILY          Review of Systems:    Review of Systems   Constitutional: Negative for activity change, chills, diaphoresis, fatigue and fever. HENT: Negative for congestion and sore throat. Eyes: Negative. Respiratory: Negative for cough, chest tightness, shortness of breath and wheezing. Cardiovascular: Negative for chest pain, palpitations and leg swelling. Gastrointestinal: Negative. Genitourinary: Negative. Musculoskeletal: Negative. Neurological: Negative for dizziness, syncope, light-headedness and headaches. Psychiatric/Behavioral: Negative for confusion. The patient is not nervous/anxious. Objective:    BP (!) 144/86   Pulse 74   Ht 6' (1.829 m)   Wt 212 lb (96.2 kg)   SpO2 97%   BMI 28.75 kg/m²     GENERAL - well developed and well nourished, conversant  HEENT -  PERRLA, Hearing appears normal, gentleman lids are normal.  External inspection of ears and nose appear normal.  NECK - no thyromegaly, no JVD, trachea is in the midline  CARDIOVASCULAR - PMI is in the mid line clavicular position, Normal S1 and S2 with no systolic murmur. No S3 or S4    PULMONARY - no respiratory distress. No wheezes or rales. Lungs are clear to ausculation, normal respiratory effort. ABDOMEN  - soft, non tender, no rebound  MUSCULOSKELETAL  - range of motion of the upper and lower extermites appears normal and equal and is without pain   EXTREMITIES - no significant edema   NEUROLOGIC - gait and station are normal  SKIN - turgor is normal, can warm and dry. PSYCHIATRIC - normal mood and affect, alert and orientated x 3,      ASSESSMENT:    ALL THE CARDIOLOGY PROBLEMS ARE LISTED ABOVE; HOWEVER, THE FOLLOWING SPECIFIC CARDIAC PROBLEMS / CONDITIONS WERE ADDRESSED AND TREATED DURING THE OFFICE VISIT TODAY:                                                                                            MEDICAL DECISION MAKING             Cardiac Specific Problem / Diagnosis  Discussion and Data Reviewed Diagnostic Procedures Ordered Management Options Selected           1. Hypertension   Worsening   Review and summation of old records:    Blood pressure in the office today 144/86. O2 sat 97%. Patient was taking Avapro 150 mg half tablet in the morning and full tablet in the evening. With no resolution in or improvement in his blood pressures. Will change Avapro to 150 mg daily. And add indapamide 1.25 mg daily. Will recheck on return to clinic and obtain BMP on return to clinic. EKG in the office showing sinus rhythm with a heart rate of 74 bpm. Yes Continue current medications: Add indapamide 1.25 mg daily. 2.   Hyperlipidemia  Stable   Managed by primary care provider. Patient is on Lipitor 20 mg daily. Fenofibrate 160 mg daily. No Continue current medications: Yes                                     Orders Placed This Encounter   Procedures    EKG 12 lead     Order Specific Question:   Reason for Exam?     Answer:   Hypertension     Orders Placed This Encounter   Medications    indapamide (LOZOL) 1.25 MG tablet     Sig: Take 1 tablet by mouth every morning     Dispense:  90 tablet     Refill:  3    irbesartan (AVAPRO) 150 MG tablet     Sig: Take 1 tablet by mouth daily TAKE 1/2 TABLET EVERY MORNING AND TAKE 1 TABLET AT BEDTIME     Dispense:  90 tablet     Refill:  3       Discussed with patient. Return in about 4 weeks (around 11/19/2020) for Blood pressure check with me . I greatly appreciate the opportunity to meet Job Apa and your confidence in allowing me to participate in his cardiovascular care. ZAY Viera NP  10/22/2020 9:59 AM CDT                    This dictation was generated by voice recognition computer software. Although all attempts are made to edit dictation for accuracy, there may be errors in the transcription that are not intended.

## 2020-11-09 PROBLEM — C61 PROSTATE CANCER (HCC): Status: ACTIVE | Noted: 2019-05-01

## 2020-11-10 ENCOUNTER — HOSPITAL ENCOUNTER (EMERGENCY)
Age: 65
Discharge: HOME | End: 2020-11-10
Payer: MEDICARE

## 2020-11-10 VITALS
DIASTOLIC BLOOD PRESSURE: 91 MMHG | RESPIRATION RATE: 18 BRPM | HEART RATE: 71 BPM | OXYGEN SATURATION: 96 % | TEMPERATURE: 98.7 F | SYSTOLIC BLOOD PRESSURE: 142 MMHG

## 2020-11-10 LAB
ALBUMIN SERPL-MCNC: 4.5 G/DL (ref 3.5–5.2)
ALP BLD-CCNC: 22 U/L (ref 40–130)
ALT SERPL-CCNC: 22 U/L (ref 5–41)
ANION GAP SERPL CALCULATED.3IONS-SCNC: 9 MMOL/L (ref 7–19)
AST SERPL-CCNC: 26 U/L (ref 5–40)
BASOPHILS ABSOLUTE: 0 K/UL (ref 0–0.2)
BASOPHILS RELATIVE PERCENT: 0.3 % (ref 0–1)
BILIRUB SERPL-MCNC: 0.4 MG/DL (ref 0.2–1.2)
BUN BLDV-MCNC: 26 MG/DL (ref 8–23)
CALCIUM SERPL-MCNC: 9.9 MG/DL (ref 8.8–10.2)
CHLORIDE BLD-SCNC: 101 MMOL/L (ref 98–111)
CO2: 24 MMOL/L (ref 22–29)
CREAT SERPL-MCNC: 1.2 MG/DL (ref 0.5–1.2)
EOSINOPHILS ABSOLUTE: 0 K/UL (ref 0–0.6)
EOSINOPHILS RELATIVE PERCENT: 0.3 % (ref 0–5)
GFR AFRICAN AMERICAN: >59
GFR NON-AFRICAN AMERICAN: >60
GLUCOSE BLD-MCNC: 95 MG/DL (ref 74–109)
HCT VFR BLD CALC: 41.2 % (ref 42–52)
HEMOGLOBIN: 14 G/DL (ref 14–18)
IMMATURE GRANULOCYTES #: 0 K/UL
LYMPHOCYTES ABSOLUTE: 1.1 K/UL (ref 1.1–4.5)
LYMPHOCYTES RELATIVE PERCENT: 16 % (ref 20–40)
MCH RBC QN AUTO: 30.7 PG (ref 27–31)
MCHC RBC AUTO-ENTMCNC: 34 G/DL (ref 33–37)
MCV RBC AUTO: 90.4 FL (ref 80–94)
MONOCYTES ABSOLUTE: 0.5 K/UL (ref 0–0.9)
MONOCYTES RELATIVE PERCENT: 7.9 % (ref 0–10)
NEUTROPHILS ABSOLUTE: 5.1 K/UL (ref 1.5–7.5)
NEUTROPHILS RELATIVE PERCENT: 75.4 % (ref 50–65)
PDW BLD-RTO: 12.5 % (ref 11.5–14.5)
PLATELET # BLD: 237 K/UL (ref 130–400)
PMV BLD AUTO: 9.7 FL (ref 9.4–12.4)
POTASSIUM REFLEX MAGNESIUM: 4 MMOL/L (ref 3.5–5)
RBC # BLD: 4.56 M/UL (ref 4.7–6.1)
SODIUM BLD-SCNC: 134 MMOL/L (ref 136–145)
TOTAL PROTEIN: 6.8 G/DL (ref 6.6–8.7)
WBC # BLD: 6.7 K/UL (ref 4.8–10.8)

## 2020-11-10 PROCEDURE — 99283 EMERGENCY DEPT VISIT LOW MDM: CPT

## 2020-11-10 PROCEDURE — 93005 ELECTROCARDIOGRAM TRACING: CPT | Performed by: NURSE PRACTITIONER

## 2020-11-10 PROCEDURE — 36415 COLL VENOUS BLD VENIPUNCTURE: CPT

## 2020-11-10 PROCEDURE — 80053 COMPREHEN METABOLIC PANEL: CPT

## 2020-11-10 PROCEDURE — 99999 PR OFFICE/OUTPT VISIT,PROCEDURE ONLY: CPT | Performed by: NURSE PRACTITIONER

## 2020-11-10 PROCEDURE — 6370000000 HC RX 637 (ALT 250 FOR IP): Performed by: NURSE PRACTITIONER

## 2020-11-10 PROCEDURE — 85025 COMPLETE CBC W/AUTO DIFF WBC: CPT

## 2020-11-10 RX ORDER — CLONIDINE HYDROCHLORIDE 0.1 MG/1
0.1 TABLET ORAL ONCE
Status: COMPLETED | OUTPATIENT
Start: 2020-11-10 | End: 2020-11-10

## 2020-11-10 RX ORDER — CLONIDINE HYDROCHLORIDE 0.1 MG/1
0.1 TABLET ORAL 2 TIMES DAILY PRN
Qty: 20 TABLET | Refills: 0 | Status: SHIPPED | OUTPATIENT
Start: 2020-11-10 | End: 2020-11-20 | Stop reason: SDUPTHER

## 2020-11-10 RX ADMIN — CLONIDINE HYDROCHLORIDE 0.1 MG: 0.1 TABLET ORAL at 17:38

## 2020-11-10 ASSESSMENT — ENCOUNTER SYMPTOMS: VOMITING: 0

## 2020-11-10 NOTE — ED PROVIDER NOTES
[facility]  eMERGENCY dEPARTMENT eNCOUnter      Pt Name: Brooks Prakash  MRN: [id number]  [place] 1955  Date of evaluation: 11/10/2020  Provider: ZAY Conklin    CHIEF COMPLAINT       Chief Complaint   Patient presents with    Hypertension     for one month         HISTORY OF PRESENT ILLNESS   (Location/Symptom, Timing/Onset,Context/Setting, Quality, Duration, Modifying Factors, Severity)  Note limiting factors. Brooks Prakash is a 75 y.o. male who presents to the emergency department with elevated blood pressure x1 month. Patient has a list on his phone of his blood pressures. He has an appointment in 10 days to follow-up with the cardiology office. He is a patient of Dr. Garcia. His blood pressure medicine was increased at his last appointment. Patient has complained of a headache. No chest pain. He is taking his medication as prescribed. The history is provided by the patient. Hypertension   Severity:  Mild  Onset quality:  Gradual  Duration:  1 month  Timing:  Intermittent  Progression:  Worsening  Chronicity:  Chronic  Context: not drug abuse and not noncompliance    Associated symptoms: headaches    Associated symptoms: no chest pain, no fever, not vomiting and no weakness    Risk factors: kidney disease        NursingNotes were reviewed. REVIEW OF SYSTEMS    (2-9 systems for level 4, 10 or more for level 5)     Review of Systems   Constitutional: Negative for fever. Cardiovascular: Negative for chest pain. Gastrointestinal: Negative for vomiting. Neurological: Positive for headaches. Negative for weakness. Except as noted above the remainder of the review of systems was reviewed and negative.        PAST MEDICAL HISTORY     Past Medical History:   Diagnosis Date    Arthritis     OSTEO    CKD (chronic kidney disease) stage 3, GFR 30-59 ml/min 8/10/2017    Hyperglycemia 8/10/2017    Hyperlipidemia     Hypertension     Mixed hyperlipidemia 8/10/2017    Prostate carcinoma ([facility] [address] 8/10/2017    Sleep apnea, obstructive 9/8/2020         SURGICALHISTORY       Past Surgical History:   Procedure Laterality Date    KNEE SURGERY Left     knee ligamentous reconstruction with residual degenerative arthritis    PARATHYROIDECTOMY Left 09/16/2016    parathyroid resection single tumor removal left inferior/adenoma with associated hyperparathyroidism     PROSTATECTOMY  2012    TONSILLECTOMY      TOTAL KNEE ARTHROPLASTY Left 5/17/2017    KNEE TOTAL ARTHROPLASTY performed by Yesy Acosta [facility] at [facility]       Discharge Medication List as of 11/10/2020  5:54 PM      CONTINUE these medications which have NOT CHANGED    Details   leuprolide acetate, 6 Month, (ELIGARD) 45 MG injection Inject 45 mg into the skin onceHistorical Med      indapamide (LOZOL) 1.25 MG tablet Take 1 tablet by mouth every morning, Disp-90 tablet,R-3Normal      irbesartan (AVAPRO) 150 MG tablet Take 1 tablet by mouth daily TAKE 1/2 TABLET EVERY MORNING AND TAKE 1 TABLET AT BEDTIME, Disp-90 tablet,R-3Normal      VITAMIN D PO Take by mouth Patient uncertain of dosing. Historical Med      atorvastatin (LIPITOR) 20 MG tablet TAKE 1 TABLET EVERY EVENING, Disp-90 tablet,R-3Normal      fenofibrate (TRIGLIDE) 160 MG tablet TAKE 1 TABLET EVERY DAY, Disp-90 tablet,R-3Normal      Zinc Sulfate (ZINC 15 PO) Take by mouthHistorical Med      Probiotic Product (PROBIOTIC ADVANCED PO) Take by mouth daily Historical Med      Ascorbic Acid (VITAMIN C) 1000 MG tablet Take 1,000 mg by mouth dailyHistorical Med      Multiple Vitamin (MULTIVITAMIN) capsule Take 1 capsule by mouth daily Historical Med             ALLERGIES     Patient has no known allergies.     FAMILY HISTORY       Family History   Problem Relation Age of Onset    Cancer Mother 52        large cell nonkeratinizing squamous cell of the pancreas                          COPD Father     Cancer Father         Hodgkin's disease head, face, neck    Stroke Father         ischemic          SOCIAL HISTORY       Social History     Socioeconomic History    Marital status:      Spouse name: Chrissy Kaur Number of children: 2    Years of education: 25    Highest education level: None   Occupational History     Employer: RETIRED   Social Needs    Financial resource strain: None    Food insecurity     Worry: None     Inability: None    Transportation needs     Medical: None     Non-medical: None   Tobacco Use    Smoking status: Former Smoker     Packs/day: 1.00     Years: 20.00     Pack years: 20.00     Types: Cigarettes     Last attempt to quit: 1992     Years since quittin.5    Smokeless tobacco: Never Used   Substance and Sexual Activity    Alcohol use:  Yes     Alcohol/week: 2.0 standard drinks     Types: 2 Glasses of wine per week     Comment: DAILY    Drug use: No    Sexual activity: Yes     Partners: Female   Lifestyle    Physical activity     Days per week: None     Minutes per session: None    Stress: None   Relationships    Social connections     Talks on phone: None     Gets together: None     Attends Advent service: None     Active member of club or organization: None     Attends meetings of clubs or organizations: None     Relationship status: None    Intimate partner violence     Fear of current or ex partner: None     Emotionally abused: None     Physically abused: None     Forced sexual activity: None   Other Topics Concern    None   Social History Narrative    Retired executive  for [employer]    Just retired about 15 months ago     39 years    He has 1 son and 1 daughter    Under a lot of stress recently in the process of building a house down on [place] near [facility] [employer] degree KRISTOPHER    Never in the [facility] [address]    Moderately active    Smoked previously quit 21 years ago I believe denies significant alcohol consumption or substance usage       SCREENINGS      @FLOW([id number])@      PHYSICAL EXAM    (up to 7 for level 4, 8 or more for level 5)     ED Triage Vitals [11/10/20 1531]   BP Temp Temp src Pulse Resp SpO2 Height Weight   (!) 141/106 98.7 °F (37.1 °C) -- 71 18 99 % -- --       Physical Exam  Vitals signs and nursing note reviewed. Constitutional:       Appearance: He is well-developed. HENT:      Head: Normocephalic and atraumatic. Eyes:      General: No scleral icterus. Right eye: No discharge. Left eye: No discharge. Neck:      Musculoskeletal: Normal range of motion and neck supple. Cardiovascular:      Rate and Rhythm: Normal rate and regular rhythm. Heart sounds: Normal heart sounds. Pulmonary:      Effort: No respiratory distress. Neurological:      Mental Status: He is alert.    Psychiatric:         Behavior: Behavior normal.         DIAGNOSTIC RESULTS     EKG: All EKG's are interpreted by the Emergency Department Physician who either signs or Co-signsthis chart in the absence of a cardiologist.  67 sinus rhythm no T wave abnormalities read at 1630 by Dr. Elmer Bueno:   Mic Sharp such as CT, Ultrasound and MRI are read by the radiologist. Na Gutierrez radiographic images are visualized and preliminarily interpreted by the emergency physician with the below findings:      Interpretation per the Radiologist below, if available at the time of this note:    No orders to display         ED BEDSIDEULTRASOUND:   Performed by ED Physician -none    LABS:  Labs Reviewed   COMPREHENSIVE METABOLIC PANEL W/ REFLEX TO MG FOR LOW K - Abnormal; Notable for the following components:       Result Value    Sodium 134 (*)     BUN 26 (*)     Alkaline Phosphatase 22 (*)     All other components within normal limits   CBC WITH AUTO DIFFERENTIAL - Abnormal; Notable for the following components:    RBC 4.56 (*)     Hematocrit 41.2 (*)     Neutrophils % 75.4 (*)     Lymphocytes % 16.0 (*)     All other components within normal limits       All other labs were within normal range or not returned as of this dictation. EMERGENCY DEPARTMENT COURSE and DIFFERENTIALDIAGNOSIS/MDM:   Vitals:    Vitals:    11/10/20 1733 11/10/20 1734 11/10/20 1738 11/10/20 1802   BP: (!) 147/102 (!) 160/98 (!) 160/98 (!) 142/91   Pulse:       Resp:       Temp:       SpO2: 96%   96%           MDM patient to call the cardiology office for follow-up we will give him some clonidine as needed to use her blood pressures over 190/100      CONSULTS:  None    PROCEDURES:  Unless otherwise noted below, none     Procedures    FINAL IMPRESSION      1.  Essential hypertension        DISPOSITION/PLAN   DISPOSITION Decision To Discharge 11/10/2020 05:52:17 PM      PATIENT REFERRED TO:  Saturnino Roe MD  [facility] [address] [place] [address] [phone]  [phone]    Call         DISCHARGE MEDICATIONS:  Discharge Medication List as of 11/10/2020  5:54 PM      START taking these medications    Details   cloNIDine (CATAPRES) 0.1 MG tablet Take 1 tablet by mouth 2 times daily as needed for High Blood Pressure (for blood pressure >190/100), Disp-20 tablet,R-0Print                (Please note that portions of this note were completed with a voice recognitionprogram.  Efforts were made to edit the dictations but occasionally words are mis-transcribed.)    ZAY Kim (electronically signed)          ZAY Kim  11/10/20 1812

## 2020-11-10 NOTE — ED NOTES
Bed: 01-A  Expected date:   Expected time:   Means of arrival:   Comments:  Pt in room     Desirae Vaughn RN  11/10/20 2101

## 2020-11-11 PROBLEM — Z79.818 PROPHYLACTIC USE OF LEUPROLIDE ACETATE (LUPRON): Status: ACTIVE | Noted: 2020-11-11

## 2020-11-11 PROBLEM — Z92.3 HISTORY OF RADIATION THERAPY: Status: ACTIVE | Noted: 2020-11-11

## 2020-11-11 PROBLEM — Z87.891 FORMER SMOKER: Status: ACTIVE | Noted: 2020-11-11

## 2020-11-11 PROBLEM — Z90.79 S/P PROSTATECTOMY: Status: ACTIVE | Noted: 2020-11-11

## 2020-11-11 LAB
EKG P AXIS: 28 DEGREES
EKG P-R INTERVAL: 188 MS
EKG Q-T INTERVAL: 382 MS
EKG QRS DURATION: 84 MS
EKG QTC CALCULATION (BAZETT): 392 MS
EKG T AXIS: 36 DEGREES

## 2020-11-11 PROCEDURE — 93010 ELECTROCARDIOGRAM REPORT: CPT | Performed by: INTERNAL MEDICINE

## 2020-11-13 ENCOUNTER — CONSULT (OUTPATIENT)
Dept: RADIATION ONCOLOGY | Facility: HOSPITAL | Age: 65
End: 2020-11-13

## 2020-11-13 ENCOUNTER — HOSPITAL ENCOUNTER (OUTPATIENT)
Dept: RADIATION ONCOLOGY | Facility: HOSPITAL | Age: 65
Setting detail: RADIATION/ONCOLOGY SERIES
End: 2020-11-13

## 2020-11-13 VITALS — BODY MASS INDEX: 29.93 KG/M2 | HEIGHT: 72 IN | WEIGHT: 221 LBS

## 2020-11-13 DIAGNOSIS — C79.51 BONE METASTASES: ICD-10-CM

## 2020-11-13 DIAGNOSIS — C77.9 REGIONAL LYMPH NODE METASTASIS PRESENT (HCC): ICD-10-CM

## 2020-11-13 DIAGNOSIS — Z79.818 PROPHYLACTIC USE OF LEUPROLIDE ACETATE (LUPRON): ICD-10-CM

## 2020-11-13 DIAGNOSIS — Z92.3 HISTORY OF RADIATION THERAPY: ICD-10-CM

## 2020-11-13 DIAGNOSIS — Z87.891 FORMER SMOKER: ICD-10-CM

## 2020-11-13 DIAGNOSIS — C61 RECURRENT PROSTATE ADENOCARCINOMA (HCC): Primary | ICD-10-CM

## 2020-11-13 RX ORDER — ASCORBIC ACID 500 MG
500 TABLET ORAL DAILY
COMMUNITY

## 2020-11-13 RX ORDER — CLONIDINE HYDROCHLORIDE 0.1 MG/1
1 TABLET ORAL 2 TIMES DAILY
COMMUNITY
Start: 2020-11-10

## 2020-11-13 RX ORDER — IRBESARTAN 75 MG/1
75 TABLET ORAL DAILY
COMMUNITY

## 2020-11-15 PROBLEM — N18.30 CKD (CHRONIC KIDNEY DISEASE) STAGE 3, GFR 30-59 ML/MIN (HCC): Status: ACTIVE | Noted: 2017-08-10

## 2020-11-15 PROBLEM — Z90.79 HISTORY OF PROSTATECTOMY: Status: ACTIVE | Noted: 2019-10-07

## 2020-11-20 ENCOUNTER — TELEMEDICINE (OUTPATIENT)
Dept: CARDIOLOGY | Age: 65
End: 2020-11-20
Payer: MEDICARE

## 2020-11-20 PROCEDURE — G8427 DOCREV CUR MEDS BY ELIG CLIN: HCPCS | Performed by: NURSE PRACTITIONER

## 2020-11-20 PROCEDURE — 4040F PNEUMOC VAC/ADMIN/RCVD: CPT | Performed by: NURSE PRACTITIONER

## 2020-11-20 PROCEDURE — 1036F TOBACCO NON-USER: CPT | Performed by: NURSE PRACTITIONER

## 2020-11-20 PROCEDURE — G8417 CALC BMI ABV UP PARAM F/U: HCPCS | Performed by: NURSE PRACTITIONER

## 2020-11-20 PROCEDURE — G8484 FLU IMMUNIZE NO ADMIN: HCPCS | Performed by: NURSE PRACTITIONER

## 2020-11-20 PROCEDURE — 3017F COLORECTAL CA SCREEN DOC REV: CPT | Performed by: NURSE PRACTITIONER

## 2020-11-20 PROCEDURE — 1123F ACP DISCUSS/DSCN MKR DOCD: CPT | Performed by: NURSE PRACTITIONER

## 2020-11-20 PROCEDURE — 99214 OFFICE O/P EST MOD 30 MIN: CPT | Performed by: NURSE PRACTITIONER

## 2020-11-20 RX ORDER — CLONIDINE HYDROCHLORIDE 0.1 MG/1
0.1 TABLET ORAL NIGHTLY
Qty: 90 TABLET | Refills: 3 | Status: SHIPPED | OUTPATIENT
Start: 2020-11-20 | End: 2021-07-27

## 2020-11-20 RX ORDER — IRBESARTAN 150 MG/1
150 TABLET ORAL 2 TIMES DAILY
Qty: 180 TABLET | Refills: 3 | Status: SHIPPED | OUTPATIENT
Start: 2020-11-20 | End: 2021-06-13

## 2020-12-01 ENCOUNTER — TELEPHONE (OUTPATIENT)
Dept: CARDIOLOGY | Age: 65
End: 2020-12-01

## 2020-12-01 ENCOUNTER — HOSPITAL ENCOUNTER (OUTPATIENT)
Dept: RADIATION ONCOLOGY | Facility: HOSPITAL | Age: 65
Setting detail: RADIATION/ONCOLOGY SERIES
End: 2020-12-01

## 2020-12-21 ENCOUNTER — TELEPHONE (OUTPATIENT)
Dept: CARDIOLOGY | Age: 65
End: 2020-12-21

## 2021-01-04 ENCOUNTER — HOSPITAL ENCOUNTER (OUTPATIENT)
Dept: RADIATION ONCOLOGY | Facility: HOSPITAL | Age: 66
Setting detail: RADIATION/ONCOLOGY SERIES
End: 2021-01-04

## 2021-01-12 ENCOUNTER — PATIENT MESSAGE (OUTPATIENT)
Dept: PRIMARY CARE CLINIC | Age: 66
End: 2021-01-12

## 2021-01-14 NOTE — TELEPHONE ENCOUNTER
From: Rita Fernandes  To: Emily Metzger MD  Sent: 1/12/2021 4:01 PM CST  Subject: Non-Urgent Medical Question    My medical oncologist, Darling Lim, at [facility] started me on two hormone suppression medications:   1) Leuprolide Acetate (Eligard) 45 mg subcutaneous - six-month intervals, started in Oct 2020; and   2) Enzalutamide (Xtandi) 160 mg daily, started in Nov 2020 (Enzalutamide may reduce the blood levels of atorvastatin, which may make the medication less effective for the treatment of high cholesterol. So we need to monitor this interaction and adjust the atorvastatin or look for alternatives that don't interact.)    Please add these medications to my chart.      Thx.

## 2021-01-18 ENCOUNTER — TELEPHONE (OUTPATIENT)
Dept: CARDIOLOGY CLINIC | Age: 66
End: 2021-01-18

## 2021-01-19 ENCOUNTER — TELEMEDICINE (OUTPATIENT)
Dept: CARDIOLOGY CLINIC | Age: 66
End: 2021-01-19
Payer: MEDICARE

## 2021-01-19 DIAGNOSIS — E78.5 HYPERLIPIDEMIA, UNSPECIFIED HYPERLIPIDEMIA TYPE: ICD-10-CM

## 2021-01-19 DIAGNOSIS — I10 ESSENTIAL HYPERTENSION: Primary | ICD-10-CM

## 2021-01-19 PROCEDURE — 1123F ACP DISCUSS/DSCN MKR DOCD: CPT | Performed by: NURSE PRACTITIONER

## 2021-01-19 PROCEDURE — G8427 DOCREV CUR MEDS BY ELIG CLIN: HCPCS | Performed by: NURSE PRACTITIONER

## 2021-01-19 PROCEDURE — 4040F PNEUMOC VAC/ADMIN/RCVD: CPT | Performed by: NURSE PRACTITIONER

## 2021-01-19 PROCEDURE — 99213 OFFICE O/P EST LOW 20 MIN: CPT | Performed by: NURSE PRACTITIONER

## 2021-01-19 PROCEDURE — 3017F COLORECTAL CA SCREEN DOC REV: CPT | Performed by: NURSE PRACTITIONER

## 2021-01-19 NOTE — PROGRESS NOTES
2021    TELEHEALTH EVALUATION -- Audio/Visual (During LNRSY-14 public health emergency)    HPI:    Gamaliel Hastings (:  1955) has requested an audio/video evaluation for the following concern(s):    There are 9is a 47-year-old man with a history of CKD, prostate cancer, MARIFER, arthritis, hypertension, and hyperlipidemia who presents today with a chief complaint of follow-up for BP check. At last visit in November, his Avapro was increased to 150 mg twice a day and clonidine 0.1 mg nightly was added. His blood pressure has been controlled since this change. His systolic readings average 110-135. His diastolic readings average 70s-80s. He denies any exertional chest pain or shortness of breath. He denies any fast or slow heart rhythms. he is sleeping on 1 pillow at night. he is not waking gasping for air. he denies any swelling. he has been compliant with his medications. his BP has been controlled. PCP follows labs and lipids. Mr. Apolinar Kumar was present in his home in [place], [place] during the video visit. I was present at the [facility] office in [place], [place] during the video visit. Leila Florez, medical assistant, assisted me with a video visit. Review of Systems  Constitutional: Negative for fever, chills, diaphoresis, activity change, appetite change, fatigue and unexpected weight change. Eyes: Negative for photophobia, pain, redness and visual disturbance. Respiratory: Negative for apnea, cough, chest tightness, shortness of breath, wheezing and stridor. Cardiovascular: Negative for chest pain, palpitations and leg swelling. Gastrointestinal: Negative for abdominal distention. Genitourinary: Negative for dysuria, urgency and frequency. Musculoskeletal: Negative for myalgias, arthralgias and gait problem. Skin: Negative for color change, pallor, rash and wound. Neurological: Negative for dizziness, tremors, speech difficulty, weakness and numbness. Hematological: Does not bruise/bleed easily. Psychiatric/Behavioral: Negative. Prior to Visit Medications    Medication Sig Taking? Authorizing Provider   Enzalutamide (XTANDI PO) Take 160 mg by mouth daily Yes Historical Provider, MD   irbesartan (AVAPRO) 150 MG tablet Take 1 tablet by mouth 2 times daily TAKE 1 TABLET EVERY MORNING AND TAKE 1 TABLET AT BEDTIME Yes ZAY Ornelas NP   cloNIDine (CATAPRES) 0.1 MG tablet Take 1 tablet by mouth nightly Yes ZAY Ornelas NP   leuprolide acetate, 6 Month, (ELIGARD) 45 MG injection Inject 45 mg into the skin once Yes Historical Provider, MD   VITAMIN D PO Take by mouth Patient uncertain of dosing. Yes Historical Provider, MD   atorvastatin (LIPITOR) 20 MG tablet TAKE 1 TABLET EVERY EVENING Yes ZAY Quezada   fenofibrate (TRIGLIDE) 160 MG tablet TAKE 1 TABLET EVERY DAY Yes ZAY Quezada   Zinc Sulfate (ZINC 15 PO) Take by mouth Yes Historical Provider, MD   Probiotic Product (PROBIOTIC ADVANCED PO) Take by mouth daily  Yes Historical Provider, MD   Ascorbic Acid (VITAMIN C) 1000 MG tablet Take 1,000 mg by mouth daily Yes Historical Provider, MD   Multiple Vitamin (MULTIVITAMIN) capsule Take 1 capsule by mouth daily  Yes Historical Provider, MD       Social History     Tobacco Use    Smoking status: Former Smoker     Packs/day: 1.00     Years: 20.00     Pack years: 20.00     Types: Cigarettes     Quit date: 1992     Years since quittin.7    Smokeless tobacco: Never Used   Substance Use Topics    Alcohol use:  Yes     Alcohol/week: 2.0 standard drinks     Types: 2 Glasses of wine per week     Comment: DAILY    Drug use: No        No Known Allergies,   Past Medical History:   Diagnosis Date    Arthritis     OSTEO    CKD (chronic kidney disease) stage 3, GFR 30-59 ml/min 8/10/2017    Hyperglycemia 8/10/2017    Hyperlipidemia     Hypertension     Mixed hyperlipidemia 8/10/2017  Prostate carcinoma ([facility] [address] 8/10/2017    Sleep apnea, obstructive 2020   ,   Past Surgical History:   Procedure Laterality Date    KNEE SURGERY Left     knee ligamentous reconstruction with residual degenerative arthritis    PARATHYROIDECTOMY Left 2016    parathyroid resection single tumor removal left inferior/adenoma with associated hyperparathyroidism     PROSTATECTOMY  2012    TONSILLECTOMY      TOTAL KNEE ARTHROPLASTY Left 2017    KNEE TOTAL ARTHROPLASTY performed by Girish Manzano MD at [address] [place] [address]   ,   Social History     Tobacco Use    Smoking status: Former Smoker     Packs/day: 1.00     Years: 20.00     Pack years: 20.00     Types: Cigarettes     Quit date: 1992     Years since quittin.7    Smokeless tobacco: Never Used   Substance Use Topics    Alcohol use:  Yes     Alcohol/week: 2.0 standard drinks     Types: 2 Glasses of wine per week     Comment: DAILY    Drug use: No   ,   Family History   Problem Relation Age of Onset    Cancer Mother 52        large cell nonkeratinizing squamous cell of the pancreas                          COPD Father     Cancer Father         Hodgkin's disease head, face, neck    Stroke Father         ischemic   ,   Health Maintenance   Topic Date Due    Shingles Vaccine (1 of 2) 2003    A1C test (Diabetic or Prediabetic)  2020    Lipid screen  2021    Annual Wellness Visit (AWV)  2021    PSA counseling  10/05/2021    Potassium monitoring  11/10/2021    Creatinine monitoring  11/10/2021    Colon cancer screen colonoscopy  04/15/2024    DTaP/Tdap/Td vaccine (2 - Td) 2028    Flu vaccine  Completed    Pneumococcal 65+ years Vaccine  Completed    Hepatitis C screen  Completed    Hepatitis A vaccine  Aged Out    Hepatitis B vaccine  Aged Out    Hib vaccine  Aged Out    Meningococcal (ACWY) vaccine  Aged Out       PHYSICAL EXAMINATION: [ INSTRUCTIONS:  \"[x]\" Indicates a positive item  \"[]\" Indicates a negative item  -- DELETE ALL ITEMS NOT EXAMINED]  Vital Signs: (As obtained by patient/caregiver or practitioner observation)    Blood pressure- 130/79 Heart rate- 57       Constitutional: [x] Appears well-developed and well-nourished [x] No apparent distress      [] Abnormal-   Mental status  [x] Alert and awake  [] Oriented to person/place/time []Able to follow commands      Eyes:  EOM    [x]  Normal  [] Abnormal-  Sclera  [x]  Normal  [] Abnormal -         Discharge []  None visible  [] Abnormal -    HENT:   [x] Normocephalic, atraumatic. [] Abnormal   [x] Mouth/Throat: Mucous membranes are moist.     External Ears [x] Normal  [] Abnormal-     Neck: [x] No visualized mass     Pulmonary/Chest: [x] Respiratory effort normal.  [] No visualized signs of difficulty breathing or respiratory distress        [] Abnormal-      Musculoskeletal:   [x] Normal gait with no signs of ataxia         [x] Normal range of motion of neck        [] Abnormal-       Neurological:        [x] No Facial Asymmetry (Cranial nerve 7 motor function) (limited exam to video visit)          [] No gaze palsy        [] Abnormal-         Skin:        [x] No significant exanthematous lesions or discoloration noted on facial skin         [] Abnormal-            Psychiatric:       [x] Normal Affect [] No Hallucinations        [] Abnormal-     Other pertinent observable physical exam findings-     ASSESSMENT/PLAN:  HTN & HLD    HTN- Controlled at home, No changes    HLD-followed by PCP, on Lipitor and fenofibrate, no changes    Return in about 6 months (around 7/19/2021) for HTN, HLD, with Dr. Tiff Sullivan. Lincoln Marlow is a 77 y.o. male being evaluated by a Virtual Visit (video visit) encounter to address concerns as mentioned above. A caregiver was present when appropriate. Due to this being a TeleHealth encounter (During IZQV-84 public health emergency), evaluation of the following organ systems was limited: Vitals/Constitutional/EENT/Resp/CV/GI//MS/Neuro/Skin/Heme-Lymph-Imm. Pursuant to the emergency declaration under the [address] [place] [address] and the [employer] Act, this Virtual Visit was conducted with patient's (and/or legal guardian's) consent, to reduce the patient's risk of exposure to COVID-19 and provide necessary medical care. The patient (and/or legal guardian) has also been advised to contact this office for worsening conditions or problems, and seek emergency medical treatment and/or call 911 if deemed necessary. Patient identification was verified at the start of the visit: Yes    Services were provided through a video synchronous discussion virtually to substitute for in-person clinic visit. Patient and provider were located at their individual homes. --AZY Salinas on 1/19/2021 at 3:45 PM    An electronic signature was used to authenticate this note.

## 2021-02-16 ENCOUNTER — PATIENT MESSAGE (OUTPATIENT)
Dept: PRIMARY CARE CLINIC | Age: 66
End: 2021-02-16

## 2021-02-16 DIAGNOSIS — F51.01 PRIMARY INSOMNIA: Primary | ICD-10-CM

## 2021-02-17 RX ORDER — ZOLPIDEM TARTRATE 10 MG/1
10 TABLET ORAL NIGHTLY PRN
Qty: 30 TABLET | Refills: 0 | Status: SHIPPED | OUTPATIENT
Start: 2021-02-17 | End: 2021-03-15 | Stop reason: SDUPTHER

## 2021-02-17 NOTE — TELEPHONE ENCOUNTER
From: Zahra Xiao  To: Harsh Sharif MD  Sent: 2/16/2021 5:47 PM CST  Subject: Prescription Question    Dr. Sims,    We had a virtual visit scheduled at 1:30 pm today, 2/16. I never received login instructions nor a cancellation/rescheduling notification from you or your office even though a message from your office indicated virtual visits were still going to be conducted today. This was to be a simple visit to get a prescription for zolpidem. I have been using up a prescription obtained from Dr. Wayne Wells previously which is close to being completely used up. I am leaving town early Thursday morning need a new prescription for 30 tablets from you asap. Please respond stat.

## 2021-02-17 NOTE — TELEPHONE ENCOUNTER
Gay Myers called to request a refill on his medication. Last office visit : 2/16/2021   Next office visit : 3/31/2021     Last UDS: No results found for: Drain Wallace, LABBENZ, BUPRENUR, COCAIMETSCRU, GABAPENTIN, MDMA, METAMPU, OPIATESCREENURINE, OXTCOSU, PHENCYCLIDINESCREENURINE, PROPOXYPHENE, THCSCREENUR, TRICYUR    Last [facility]: pending  Medication Contract: [facility]   Last Fill: per     Requested Prescriptions      No prescriptions requested or ordered in this encounter         Please approve or refuse this medication.    Geovanni Nair LPN

## 2021-03-15 ENCOUNTER — TELEPHONE (OUTPATIENT)
Dept: ADMINISTRATIVE | Age: 66
End: 2021-03-15

## 2021-03-15 DIAGNOSIS — F51.01 PRIMARY INSOMNIA: ICD-10-CM

## 2021-03-15 RX ORDER — ZOLPIDEM TARTRATE 10 MG/1
10 TABLET ORAL NIGHTLY PRN
Qty: 30 TABLET | Refills: 2 | Status: CANCELLED | OUTPATIENT
Start: 2021-03-15 | End: 2021-04-14

## 2021-03-15 RX ORDER — ZOLPIDEM TARTRATE 10 MG/1
10 TABLET ORAL NIGHTLY PRN
Qty: 30 TABLET | Refills: 5 | Status: SHIPPED | OUTPATIENT
Start: 2021-03-15 | End: 2021-09-30 | Stop reason: SDUPTHER

## 2021-03-15 RX ORDER — ZOLPIDEM TARTRATE 10 MG/1
10 TABLET ORAL NIGHTLY PRN
Qty: 30 TABLET | Refills: 0 | Status: CANCELLED | OUTPATIENT
Start: 2021-03-15 | End: 2021-09-11

## 2021-03-15 NOTE — TELEPHONE ENCOUNTER
Pepitoian Lees called to request a refill on his medication. Last office visit : Visit date not found   Next office visit : Visit date not found     Last UDS: No results found for: LABAMPH, LABBARB, LABBENZ, BUPRENUR, COCAIMETSCRU, GABAPENTIN, MDMA, METAMPU, OPIATESCREENURINE, OXTCOSU, PHENCYCLIDINESCREENURINE, PROPOXYPHENE, THCSCREENUR, TRICYUR    Last Jared:03/10/2021  Medication Contract:04/24/2018  Last Fill: 02/17/2021    Requested Prescriptions     Pending Prescriptions Disp Refills    zolpidem (AMBIEN) 10 MG tablet 30 tablet 0     Sig: Take 1 tablet by mouth nightly as needed for Sleep for up to 180 days. Media Information                  Please approve or refuse this medication.    Macy Emery

## 2021-03-15 NOTE — TELEPHONE ENCOUNTER
Pt would like a call back in regard's to his previous message about a medication refill (zolpidem)    Please Advise

## 2021-03-15 NOTE — TELEPHONE ENCOUNTER
Patient called,still having problems getting his [place].  Please sign rx  Last office visit 9-23  Next appointment 6-7-2021

## 2021-06-07 ENCOUNTER — OFFICE VISIT (OUTPATIENT)
Dept: PRIMARY CARE CLINIC | Age: 66
End: 2021-06-07
Payer: MEDICARE

## 2021-06-07 VITALS
WEIGHT: 221 LBS | DIASTOLIC BLOOD PRESSURE: 86 MMHG | HEART RATE: 59 BPM | TEMPERATURE: 96.9 F | HEIGHT: 72 IN | OXYGEN SATURATION: 98 % | SYSTOLIC BLOOD PRESSURE: 132 MMHG | BODY MASS INDEX: 29.93 KG/M2

## 2021-06-07 DIAGNOSIS — Z13.1 DIABETES MELLITUS SCREENING: Primary | ICD-10-CM

## 2021-06-07 DIAGNOSIS — I20.8 STABLE ANGINA PECTORIS (HCC): ICD-10-CM

## 2021-06-07 DIAGNOSIS — E21.0 HYPERPARATHYROIDISM, PRIMARY (HCC): ICD-10-CM

## 2021-06-07 DIAGNOSIS — I74.4: ICD-10-CM

## 2021-06-07 DIAGNOSIS — R73.09 ELEVATED GLUCOSE: ICD-10-CM

## 2021-06-07 DIAGNOSIS — C61 PROSTATE CANCER (HCC): ICD-10-CM

## 2021-06-07 LAB — HBA1C MFR BLD: 6 %

## 2021-06-07 PROCEDURE — 3017F COLORECTAL CA SCREEN DOC REV: CPT | Performed by: FAMILY MEDICINE

## 2021-06-07 PROCEDURE — 99213 OFFICE O/P EST LOW 20 MIN: CPT | Performed by: FAMILY MEDICINE

## 2021-06-07 PROCEDURE — 1123F ACP DISCUSS/DSCN MKR DOCD: CPT | Performed by: FAMILY MEDICINE

## 2021-06-07 PROCEDURE — G8417 CALC BMI ABV UP PARAM F/U: HCPCS | Performed by: FAMILY MEDICINE

## 2021-06-07 PROCEDURE — 83036 HEMOGLOBIN GLYCOSYLATED A1C: CPT | Performed by: FAMILY MEDICINE

## 2021-06-07 PROCEDURE — 4040F PNEUMOC VAC/ADMIN/RCVD: CPT | Performed by: FAMILY MEDICINE

## 2021-06-07 PROCEDURE — 1036F TOBACCO NON-USER: CPT | Performed by: FAMILY MEDICINE

## 2021-06-07 PROCEDURE — G8427 DOCREV CUR MEDS BY ELIG CLIN: HCPCS | Performed by: FAMILY MEDICINE

## 2021-06-12 DIAGNOSIS — Z76.0 MEDICATION REFILL: ICD-10-CM

## 2021-06-13 DIAGNOSIS — I10 ESSENTIAL HYPERTENSION: ICD-10-CM

## 2021-06-13 RX ORDER — FENOFIBRATE 160 MG/1
TABLET ORAL
Qty: 90 TABLET | Refills: 3 | Status: SHIPPED | OUTPATIENT
Start: 2021-06-13 | End: 2022-04-04

## 2021-06-13 RX ORDER — ATORVASTATIN CALCIUM 20 MG/1
TABLET, FILM COATED ORAL
Qty: 90 TABLET | Refills: 3 | Status: SHIPPED | OUTPATIENT
Start: 2021-06-13 | End: 2022-03-23

## 2021-06-13 RX ORDER — IRBESARTAN 150 MG/1
TABLET ORAL
Qty: 135 TABLET | Refills: 3 | Status: SHIPPED | OUTPATIENT
Start: 2021-06-13 | End: 2022-06-17

## 2021-06-21 PROBLEM — I20.8 STABLE ANGINA PECTORIS (HCC): Status: ACTIVE | Noted: 2021-06-21

## 2021-06-21 ASSESSMENT — ENCOUNTER SYMPTOMS
CHEST TIGHTNESS: 0
CONSTIPATION: 0
COUGH: 0
SHORTNESS OF BREATH: 0
VOMITING: 0
ABDOMINAL PAIN: 0
NAUSEA: 0
DIARRHEA: 0
WHEEZING: 0

## 2021-06-22 NOTE — PROGRESS NOTES
Noman Hendrickson is a 77 y.o. male who presents today for   Chief Complaint   Patient presents with    Hypertension     6 month follow up    Fatigue     pt reports he has been more fatigue and has advanced prostate cx       HPI  Patient is here for follow-up for chronic conditions in particular his history of prostate cancer. He notes that he continues to have fatigue and he believes it secondary to treatment. He does have a history of of hypertension related chronic kidney disease and some secondary hyperparathyroidism. He has not had any issues with this that he is aware of. He continues his medication. Has a history of mild diabetes and his A1c today is 6.0. Denies any hyperglycemia or hypoglycemia related symptoms. No change in PMH, family, social, or surgical history unless mentioned above. I have reviewed the above chief complaint and HPI details charted by staff and claim ownership of the documentation. Review of Systems   Constitutional: Negative for chills and fever. Respiratory: Negative for cough, chest tightness, shortness of breath and wheezing. Cardiovascular: Negative for chest pain, palpitations and leg swelling. Gastrointestinal: Negative for abdominal pain, constipation, diarrhea, nausea and vomiting. Genitourinary: Negative for difficulty urinating, dysuria and frequency.        Past Medical History:   Diagnosis Date    Arthritis     OSTEO    CKD (chronic kidney disease) stage 3, GFR 30-59 ml/min ([facility]) 8/10/2017    Hyperglycemia 8/10/2017    Hyperlipidemia     Hypertension     Mixed hyperlipidemia 8/10/2017    Prostate carcinoma ([facility] [address] 8/10/2017    Sleep apnea, obstructive 9/8/2020       Current Outpatient Medications   Medication Sig Dispense Refill    Enzalutamide (XTANDI PO) Take 160 mg by mouth daily      cloNIDine (CATAPRES) 0.1 MG tablet Take 1 tablet by mouth nightly 90 tablet 3    leuprolide acetate, 6 Month, (ELIGARD) 45 MG injection Inject 45 mg into the skin once      VITAMIN D PO Take by mouth Patient uncertain of dosing.  Zinc Sulfate (ZINC 15 PO) Take by mouth      Probiotic Product (PROBIOTIC ADVANCED PO) Take by mouth daily       Ascorbic Acid (VITAMIN C) 1000 MG tablet Take 1,000 mg by mouth daily      Multiple Vitamin (MULTIVITAMIN) capsule Take 1 capsule by mouth daily       fenofibrate (TRIGLIDE) 160 MG tablet TAKE 1 TABLET EVERY DAY 90 tablet 3    atorvastatin (LIPITOR) 20 MG tablet TAKE 1 TABLET EVERY EVENING 90 tablet 3    irbesartan (AVAPRO) 150 MG tablet TAKE 1/2 TABLET EVERY MORNING AND TAKE 1 TABLET AT BEDTIME 135 tablet 3     No current facility-administered medications for this visit. No Known Allergies    Past Surgical History:   Procedure Laterality Date    KNEE SURGERY Left     knee ligamentous reconstruction with residual degenerative arthritis    PARATHYROIDECTOMY Left 2016    parathyroid resection single tumor removal left inferior/adenoma with associated hyperparathyroidism     PROSTATECTOMY      TONSILLECTOMY      TOTAL KNEE ARTHROPLASTY Left 2017    KNEE TOTAL ARTHROPLASTY performed by Kelli Braxton MD at [address] [place] [address]       Social History     Tobacco Use    Smoking status: Former Smoker     Packs/day: 1.00     Years: 20.00     Pack years: 20.00     Types: Cigarettes     Quit date: 1992     Years since quittin.1    Smokeless tobacco: Never Used   Vaping Use    Vaping Use: Never used   Substance Use Topics    Alcohol use:  Yes     Alcohol/week: 2.0 standard drinks     Types: 2 Glasses of wine per week     Comment: DAILY    Drug use: No       Family History   Problem Relation Age of Onset    Cancer Mother 52        large cell nonkeratinizing squamous cell of the pancreas                          COPD Father     Cancer Father         Hodgkin's disease head, face, neck    Stroke Father         ischemic       /86   Pulse 59   Temp 96.9 °F (36.1 °C)   Ht 6' (1.829 m)   Wt 221 lb (100.2 kg)   SpO2 98%   BMI 29.97 kg/m²     Physical Exam  Vitals and nursing note reviewed. Constitutional:       General: He is not in acute distress. Appearance: He is well-developed. He is not toxic-appearing or diaphoretic. HENT:      Head: Normocephalic and atraumatic. Cardiovascular:      Rate and Rhythm: Normal rate and regular rhythm. Heart sounds: Normal heart sounds. No murmur heard. No friction rub. No gallop. Pulmonary:      Effort: Pulmonary effort is normal. No respiratory distress. Breath sounds: Normal breath sounds. No wheezing or rales. Chest:      Chest wall: No tenderness. Abdominal:      General: Bowel sounds are normal. There is no distension. Palpations: Abdomen is soft. There is no mass. Tenderness: There is no abdominal tenderness. There is no guarding or rebound. Musculoskeletal:      Right lower leg: No edema. Left lower leg: No edema. Skin:     General: Skin is warm and dry. Nails: There is no clubbing. Neurological:      Mental Status: He is alert and oriented to person, place, and time. Coordination: Coordination normal.      Gait: Gait normal.         Assessment:    ICD-10-CM    1. Diabetes mellitus screening  Z13.1    2. Elevated glucose  R73.09 POCT glycosylated hemoglobin (Hb A1C)   3. Prostate cancer ([facility] [address]  C61    4. Stable angina pectoris (HCC)  I20.8    5. Peripheral thrombosis (HCC)  I74.4    6. Hyperparathyroidism, primary ([facility] [address]  E21.0        Plan:   Reviewed chronic conditions for him for which they appear to be stable at this time he remains high risk. He needs further observation and continued labs. He will have these done before the next appointment. [id number] due to just observation of dz today  Orders Placed This Encounter   Procedures    POCT glycosylated hemoglobin (Hb A1C)     No orders of the defined types were placed in this encounter. There are no discontinued medications.   There are no Patient Instructions on file for this visit. Patient given educational handouts and has had all questions answered. Patient voices understanding and agrees to plans along with risks and benefits of plan. Patient isinstructed to continue prior meds, diet, and exercise plans unless instructed otherwise. Patient agrees to follow up as instructed and sooner if needed. Patient agrees to go to ER if condition becomes emergent. Notesmay be completed with dictation device and spelling errors may occur. Materials may be copied and pasted from a notepad outside of EMR, all of which, I, Dr. Chava Dale MD, take sole intellectual ownership of and have approved adding to my note. No follow-ups on file.

## 2021-07-27 RX ORDER — CLONIDINE HYDROCHLORIDE 0.1 MG/1
TABLET ORAL
Qty: 90 TABLET | Refills: 0 | Status: SHIPPED | OUTPATIENT
Start: 2021-07-27 | End: 2021-08-06 | Stop reason: SDUPTHER

## 2021-08-06 RX ORDER — CLONIDINE HYDROCHLORIDE 0.1 MG/1
TABLET ORAL
Qty: 90 TABLET | Refills: 0 | Status: SHIPPED | OUTPATIENT
Start: 2021-08-06 | End: 2021-10-27 | Stop reason: SINTOL

## 2021-08-16 ENCOUNTER — OFFICE VISIT (OUTPATIENT)
Dept: CARDIOLOGY CLINIC | Age: 66
End: 2021-08-16
Payer: MEDICARE

## 2021-08-16 VITALS
BODY MASS INDEX: 29.26 KG/M2 | HEIGHT: 72 IN | HEART RATE: 91 BPM | SYSTOLIC BLOOD PRESSURE: 160 MMHG | DIASTOLIC BLOOD PRESSURE: 110 MMHG | WEIGHT: 216 LBS

## 2021-08-16 DIAGNOSIS — R07.9 CHEST PAIN, UNSPECIFIED TYPE: ICD-10-CM

## 2021-08-16 DIAGNOSIS — E78.5 HYPERLIPIDEMIA, UNSPECIFIED HYPERLIPIDEMIA TYPE: Primary | ICD-10-CM

## 2021-08-16 DIAGNOSIS — I25.10 CORONARY ARTERY DISEASE INVOLVING NATIVE CORONARY ARTERY OF NATIVE HEART WITHOUT ANGINA PECTORIS: ICD-10-CM

## 2021-08-16 DIAGNOSIS — I10 ESSENTIAL HYPERTENSION: ICD-10-CM

## 2021-08-16 DIAGNOSIS — E78.2 MIXED HYPERLIPIDEMIA: ICD-10-CM

## 2021-08-16 PROCEDURE — 99213 OFFICE O/P EST LOW 20 MIN: CPT | Performed by: INTERNAL MEDICINE

## 2021-08-16 PROCEDURE — 1036F TOBACCO NON-USER: CPT | Performed by: INTERNAL MEDICINE

## 2021-08-16 PROCEDURE — 93000 ELECTROCARDIOGRAM COMPLETE: CPT | Performed by: INTERNAL MEDICINE

## 2021-08-16 PROCEDURE — G8427 DOCREV CUR MEDS BY ELIG CLIN: HCPCS | Performed by: INTERNAL MEDICINE

## 2021-08-16 PROCEDURE — 3017F COLORECTAL CA SCREEN DOC REV: CPT | Performed by: INTERNAL MEDICINE

## 2021-08-16 PROCEDURE — 1123F ACP DISCUSS/DSCN MKR DOCD: CPT | Performed by: INTERNAL MEDICINE

## 2021-08-16 PROCEDURE — G8417 CALC BMI ABV UP PARAM F/U: HCPCS | Performed by: INTERNAL MEDICINE

## 2021-08-16 PROCEDURE — 4040F PNEUMOC VAC/ADMIN/RCVD: CPT | Performed by: INTERNAL MEDICINE

## 2021-08-16 ASSESSMENT — ENCOUNTER SYMPTOMS
RESPIRATORY NEGATIVE: 1
NAUSEA: 0
VOMITING: 0
DIARRHEA: 0
SHORTNESS OF BREATH: 0
GASTROINTESTINAL NEGATIVE: 1
EYES NEGATIVE: 1

## 2021-08-16 NOTE — PROGRESS NOTES
[facility] Progress Note                            Date:  8/16/2021  Patient: Opal Faulkner  Age:  77 y. o., 1955      Reason for evaluation:         SUBJECTIVE:    Returns today follow-up assessment followed for coronary artery disease hyperlipidemia hypertension. Typically at home his blood pressure runs 130s over 80s. He is being followed for advanced prostate cancer and is being treated by a urologist from [facility]. No other complaints or issues. Tired some of the time denies dyspnea denies chest pain. Last stress test 2019. ECG sinus rhythm PVCs old anteroseptal infarct no acute changes. Blood pressure 160/110 today heart rate 91. He will check his blood pressure more frequently at home. Review of Systems   Constitutional: Negative. Negative for chills, fever and unexpected weight change. HENT: Negative. Eyes: Negative. Respiratory: Negative. Negative for shortness of breath. Cardiovascular: Negative. Negative for chest pain. Gastrointestinal: Negative. Negative for diarrhea, nausea and vomiting. Endocrine: Negative. Genitourinary: Negative. Musculoskeletal: Negative. Skin: Negative. Neurological: Negative. All other systems reviewed and are negative. OBJECTIVE:     BP (!) 160/110   Pulse 91   Ht 6' (1.829 m)   Wt 216 lb (98 kg)   BMI 29.29 kg/m²     Labs:   CBC: No results for input(s): WBC, HGB, HCT, PLT in the last 72 hours. BMP:No results for input(s): NA, K, CO2, BUN, CREATININE, LABGLOM, GLUCOSE in the last 72 hours. BNP: No results for input(s): BNP in the last 72 hours. PT/INR: No results for input(s): PROTIME, INR in the last 72 hours. APTT:No results for input(s): APTT in the last 72 hours. CARDIAC ENZYMES:No results for input(s): CKTOTAL, CKMB, CKMBINDEX, TROPONINI in the last 72 hours.   FASTING LIPID PANEL:  Lab Results   Component Value Date    HDL 28 08/03/2020    LDLCALC 91 08/03/2020    TRIG 236 08/03/2020 LIVER PROFILE:No results for input(s): AST, ALT, LABALBU in the last 72 hours. Past Medical History:   Diagnosis Date    Arthritis     OSTEO    CKD (chronic kidney disease) stage 3, GFR 30-59 ml/min ([facility] [address] 8/10/2017    Hyperglycemia 8/10/2017    Hyperlipidemia     Hypertension     Mixed hyperlipidemia 8/10/2017    Prostate carcinoma ([facility] [address] 8/10/2017    Sleep apnea, obstructive 9/8/2020     Past Surgical History:   Procedure Laterality Date    KNEE SURGERY Left     knee ligamentous reconstruction with residual degenerative arthritis    PARATHYROIDECTOMY Left 09/16/2016    parathyroid resection single tumor removal left inferior/adenoma with associated hyperparathyroidism     PROSTATECTOMY  2012    TONSILLECTOMY      TOTAL KNEE ARTHROPLASTY Left 5/17/2017    KNEE TOTAL ARTHROPLASTY performed by Alexander Robledo [facility] at [facility] History   Problem Relation Age of Onset    Cancer Mother 52        large cell nonkeratinizing squamous cell of the pancreas                          COPD Father     Cancer Father         Hodgkin's disease head, face, neck    Stroke Father         ischemic     No Known Allergies  Current Outpatient Medications   Medication Sig Dispense Refill    cloNIDine (CATAPRES) 0.1 MG tablet TAKE 1 TABLET BY MOUTH EVERY DAY AT NIGHT (Patient taking differently: Take 0.1 mg by mouth as needed ) 90 tablet 0    fenofibrate (TRIGLIDE) 160 MG tablet TAKE 1 TABLET EVERY DAY 90 tablet 3    atorvastatin (LIPITOR) 20 MG tablet TAKE 1 TABLET EVERY EVENING 90 tablet 3    irbesartan (AVAPRO) 150 MG tablet TAKE 1/2 TABLET EVERY MORNING AND TAKE 1 TABLET AT BEDTIME (Patient taking differently: Take 150 mg by mouth 2 times daily ) 135 tablet 3    Enzalutamide (XTANDI PO) Take 160 mg by mouth daily      leuprolide acetate, 6 Month, (ELIGARD) 45 MG injection Inject 45 mg into the skin once      VITAMIN D PO Take by mouth Patient uncertain of dosing.       Zinc Sulfate (ZINC 15 PO) Take by mouth      Probiotic Product (PROBIOTIC ADVANCED PO) Take by mouth daily       Ascorbic Acid (VITAMIN C) 1000 MG tablet Take 1,000 mg by mouth daily      Multiple Vitamin (MULTIVITAMIN) capsule Take 1 capsule by mouth daily        No current facility-administered medications for this visit. Social History     Socioeconomic History    Marital status:      Spouse name: Cristela Rashid Number of children: 2    Years of education: 25    Highest education level: Not on file   Occupational History     Employer: RETIRED   Tobacco Use    Smoking status: Former Smoker     Packs/day: 1.00     Years: 20.00     Pack years: 20.00     Types: Cigarettes     Quit date: 1992     Years since quittin.3    Smokeless tobacco: Never Used   Vaping Use    Vaping Use: Never used   Substance and Sexual Activity    Alcohol use: Yes     Alcohol/week: 2.0 standard drinks     Types: 2 Glasses of wine per week     Comment: DAILY    Drug use: No    Sexual activity: Yes     Partners: Female   Other Topics Concern    Not on file   Social History Narrative    Retired executive  for [employer]    Just retired about 17 months ago     39 years    He has 1 son and 1 daughter    Under a lot of stress recently in the process of building a house down on [place] near [facility] [employer] degree KRISTOPHER    Never in the [address] [place] [address] Drive    Moderately active    Smoked previously quit 21 years ago I believe denies significant alcohol consumption or substance usage     Social Determinants of Health     Financial Resource Strain:     Difficulty of Paying Living Expenses:    Food Insecurity:     Worried About Running Out of Food in the Last Year:     [address] in the Last Year:    Transportation Needs:     Lack of Transportation (Medical):      Lack of Transportation (Non-Medical):    Physical Activity:     Days of Exercise per Week:     Minutes of Exercise per Session:    Stress:     Feeling of Stress :    Social Connections:     Frequency of Communication with Friends and Family:     Frequency of Social Gatherings with Friends and Family:     Attends Anabaptist Services:     Active Member of Clubs or Organizations:     Attends Club or Organization Meetings:     Marital Status:    Intimate Partner Violence:     Fear of Current or Ex-Partner:     Emotionally Abused:     Physically Abused:     Sexually Abused:        Physical Examination:  BP (!) 160/110   Pulse 91   Ht 6' (1.829 m)   Wt 216 lb (98 kg)   BMI 29.29 kg/m²   Physical Exam  Vitals reviewed. Constitutional:       Appearance: He is well-developed. Neck:      Vascular: No carotid bruit or JVD. Cardiovascular:      Rate and Rhythm: Normal rate and regular rhythm. Heart sounds: Normal heart sounds. No murmur heard. No friction rub. No gallop. Pulmonary:      Effort: Pulmonary effort is normal. No respiratory distress. Breath sounds: Normal breath sounds. No wheezing or rales. Abdominal:      General: There is no distension. Tenderness: There is no abdominal tenderness. Lymphadenopathy:      Cervical: No cervical adenopathy. Skin:     General: Skin is warm and dry. ASSESSMENT:     Diagnosis Orders   1. Hyperlipidemia, unspecified hyperlipidemia type  Lipid Panel    Lipid Panel   2. Essential hypertension  EKG 12 lead    Lipid Panel   3. Chest pain, unspecified type     4. Mixed hyperlipidemia     5. Coronary artery disease involving native coronary artery of native heart without angina pectoris         PLAN:  Orders Placed This Encounter   Procedures    Lipid Panel    Lipid Panel    EKG 12 lead     No orders of the defined types were placed in this encounter. 1. Continue present medications  2. Recommend fasting lipid profile  3. Recommend follow-up assessment in 6 months    Return in about 6 months (around 2/16/2022).       Grecia Henning Jo Porter MD 8/16/2021 2:49 PM CDT    [id number] [facility]      Thisdictation was generated by voice recognition computer software. Although all attempts are made to edit the dictation for accuracy, there may be errors in the transcription that are not intended.

## 2021-09-13 ENCOUNTER — OFFICE VISIT (OUTPATIENT)
Dept: NEUROLOGY | Age: 66
End: 2021-09-13
Payer: MEDICARE

## 2021-09-13 VITALS
HEIGHT: 72 IN | BODY MASS INDEX: 28.85 KG/M2 | SYSTOLIC BLOOD PRESSURE: 139 MMHG | RESPIRATION RATE: 16 BRPM | WEIGHT: 213 LBS | DIASTOLIC BLOOD PRESSURE: 88 MMHG | HEART RATE: 82 BPM

## 2021-09-13 DIAGNOSIS — G47.33 SLEEP APNEA, OBSTRUCTIVE: Primary | ICD-10-CM

## 2021-09-13 PROCEDURE — 1036F TOBACCO NON-USER: CPT | Performed by: PSYCHIATRY & NEUROLOGY

## 2021-09-13 PROCEDURE — G8427 DOCREV CUR MEDS BY ELIG CLIN: HCPCS | Performed by: PSYCHIATRY & NEUROLOGY

## 2021-09-13 PROCEDURE — 3017F COLORECTAL CA SCREEN DOC REV: CPT | Performed by: PSYCHIATRY & NEUROLOGY

## 2021-09-13 PROCEDURE — 1123F ACP DISCUSS/DSCN MKR DOCD: CPT | Performed by: PSYCHIATRY & NEUROLOGY

## 2021-09-13 PROCEDURE — 4040F PNEUMOC VAC/ADMIN/RCVD: CPT | Performed by: PSYCHIATRY & NEUROLOGY

## 2021-09-13 PROCEDURE — 99213 OFFICE O/P EST LOW 20 MIN: CPT | Performed by: PSYCHIATRY & NEUROLOGY

## 2021-09-13 PROCEDURE — G8417 CALC BMI ABV UP PARAM F/U: HCPCS | Performed by: PSYCHIATRY & NEUROLOGY

## 2021-09-13 NOTE — PROGRESS NOTES
[facility]  [address] [place] [address] [address]  [address] [facility]  Phone [phone]  Fax [phone]     [address] Follow Up Encounter  21 9:56 AM CDT    Information:   Patient Name: Rhea Ramirez  :   1955  Age:   77 y.o. MRN:   [id number]  Account #:  [de-identified]  Today:  21    Provider: Alexandra Finch M.D. Chief Complaint:   Chief Complaint   Patient presents with    Follow-up    Sleep Apnea       Subjective:   Rhea Ramirez is a 77 y.o. man with a history of obstructive sleep apnea who is following up. He continues to do well with his CPAP. He uses it nightly. He cannot sleep without it. He rests well and is active in the daytime. Objective:     Past Medical History:  Past Medical History:   Diagnosis Date    Arthritis     OSTEO    CKD (chronic kidney disease) stage 3, GFR 30-59 ml/min ([facility]) 8/10/2017    Hyperglycemia 8/10/2017    Hyperlipidemia     Hypertension     Mixed hyperlipidemia 8/10/2017    Prostate carcinoma ([facility] [address] 8/10/2017    Sleep apnea, obstructive 2020       Past Surgical History:   Procedure Laterality Date    KNEE SURGERY Left     knee ligamentous reconstruction with residual degenerative arthritis    PARATHYROIDECTOMY Left 2016    parathyroid resection single tumor removal left inferior/adenoma with associated hyperparathyroidism     PROSTATECTOMY  2012    TONSILLECTOMY      TOTAL KNEE ARTHROPLASTY Left 2017    KNEE TOTAL ARTHROPLASTY performed by Ute Maldonado [facility]  · None    Significant Injuries  · None    Habits  Rhea Ramirez reports that he quit smoking about 29 years ago. His smoking use included cigarettes. He has a 20.00 pack-year smoking history. He has never used smokeless tobacco. He reports current alcohol use of about 2.0 standard drinks of alcohol per week. He reports that he does not use drugs.     Family History   Problem Relation Age of Onset    Cancer Mother 52        large cell nonkeratinizing squamous cell of the pancreas                          COPD Father     Cancer Father         Hodgkin's disease head, face, neck    Stroke Father         ischemic       Social History  Elmira Johnston is , lives Stephanie Stubbs, and is retired. Medications:  Current Outpatient Medications   Medication Sig Dispense Refill    cloNIDine (CATAPRES) 0.1 MG tablet TAKE 1 TABLET BY MOUTH EVERY DAY AT NIGHT (Patient taking differently: Take 0.1 mg by mouth as needed ) 90 tablet 0    fenofibrate (TRIGLIDE) 160 MG tablet TAKE 1 TABLET EVERY DAY 90 tablet 3    atorvastatin (LIPITOR) 20 MG tablet TAKE 1 TABLET EVERY EVENING 90 tablet 3    irbesartan (AVAPRO) 150 MG tablet TAKE 1/2 TABLET EVERY MORNING AND TAKE 1 TABLET AT BEDTIME (Patient taking differently: Take 150 mg by mouth 2 times daily ) 135 tablet 3    Enzalutamide (XTANDI PO) Take 80 mg by mouth daily       leuprolide acetate, 6 Month, (ELIGARD) 45 MG injection Inject 45 mg into the skin once      VITAMIN D PO Take by mouth Patient uncertain of dosing.  Zinc Sulfate (ZINC 15 PO) Take by mouth      Probiotic Product (PROBIOTIC ADVANCED PO) Take by mouth daily       Ascorbic Acid (VITAMIN C) 1000 MG tablet Take 1,000 mg by mouth daily      Multiple Vitamin (MULTIVITAMIN) capsule Take 1 capsule by mouth daily        No current facility-administered medications for this visit. Allergies:   Allergies as of 09/13/2021    (No Known Allergies)       Review of Systems:  Constitutional: negative for - chills and fever  Eyes:  negative for - visual disturbance and photophobia  HENMT: negative for - headaches and sinus pain  Respiratory: negative for - cough, hemoptysis, and shortness of breath  Cardiovascular: negative for - chest pain and palpitations  Gastrointestinal: negative for - blood in stools, constipation, diarrhea, nausea, and vomiting  Genito-Urinary: negative for - hematuria, urinary frequency, urinary urgency, and urinary retention  Musculoskeletal: negative for - joint pain, joint stiffness, and joint swelling  Hematological and Lymphatic: negative for - bleeding problems, abnormal bruising, and swollen lymph nodes  Endocrine:  negative for - polydipsia and polyphagia  Allergy/Immunology:  negative for - rhinorrhea, sinus congestion, hives  Integument:  negative for - negative for - rash, change in moles, new or changing lesions  Psychological: negative for - anxiety and depression  Neurological: negative for - memory loss, numbness/tingling, and weakness     PHYSICAL EXAMINATION:  Vitals:  /88   Pulse 82   Resp 16   Ht 6' (1.829 m)   Wt 213 lb (96.6 kg)   BMI 28.89 kg/m²   General appearance:  Alert, well developed, well nourished, in no distress  HEENT:  normocephalic, atraumatic, sclera appear normal, no nasal abnormalities, no rhinorrhea, Ears appear normal, oral mucous membranes are moist without erythema, trachea midline, thyroid is normal, no lymphadenopathy or neck mass. III (soft palate, base of uvula visible). Cardiovascular:  Regular rate and rhythm without murmer. No peripheral edema, No cyanosis or clubbing. No carotid bruits. Pulmonary:  Lungs are clear to auscultation. Breathing appears normal, good expansion, normal effort without use of accessory muscles  Musculoskeletal:  Joints are osteoarthritic  Integument:  No rash, erythema, or pallor  Psychiatric:  Mood, affect, and behavior appear normal      NEUROLOGIC EXAMINATION:  Mental Status:  alert, oriented to person, place, and time. Speech:  Clear without dysarthria or dysphonia  Language:  Fluent without aphasia  Cranial Nerves:   II Visual fields are full to confrontation   III,IV, VI Extraocular movements are full   VII Facial movements are symmetrical without weakness   VIII Hearing is intact   XII No tongue atrophy or fasciculations. Normal tongue protrusion.   No tongue weakness  Motor:  Normal strength in both upper and lower extremities. Normal muscle tone and bulk. Deep tendon reflexes are intact and symmetrical in both upper and lower extremities. Hubbard's signs are absent bilaterally. There is no ankle clonus on either side. Sensation:  Sensation is intact to light touch and vibration in all extremities. Coordination:  Rapid alternating movements are normal in both upper and lower extremities. Finger to nose testing is unimpaired bilaterally. Gait:  Normal station and gait. Pertinent Diagnostic Studies:  CPAP download shows that he is 100% compliant with auto CPAP use at 6cm to 20cm with residual AHI of 3.4. Assessment:       ICD-10-CM    1. Sleep apnea, obstructive  G47.33    He is objectively compliant with and clinically benefiting from CPAP use. I discussed the diagnosis of obstructive sleep apnea with Tyrone Mathew including the pathophysiology (namely the mechanism of breathing and obstruction of upper airway, interruptions of sleep, hypoxemia, hypercapnia, and results of repetitive sympathetic activation), risks, evaluation, and treatment options. I discussed the risks of driving when drowsy and advised that Tyrone Mathew not drive when drowsy and avoid sedating medications and respiratory suppressants. Plan:   1. Continue CPAP use during sleep  2.          FU in a year    Electronically signed by Pattie Christianson MD on 9/13/21

## 2021-09-23 ASSESSMENT — LIFESTYLE VARIABLES
AUDIT TOTAL SCORE: 4
HAS A RELATIVE, FRIEND, DOCTOR, OR ANOTHER HEALTH PROFESSIONAL EXPRESSED CONCERN ABOUT YOUR DRINKING OR SUGGESTED YOU CUT DOWN: 0
AUDIT-C TOTAL SCORE: 0
HOW OFTEN DURING THE LAST YEAR HAVE YOU FOUND THAT YOU WERE NOT ABLE TO STOP DRINKING ONCE YOU HAD STARTED: 0
HOW OFTEN DO YOU HAVE SIX OR MORE DRINKS ON ONE OCCASION: 0
HOW OFTEN DURING THE LAST YEAR HAVE YOU NEEDED AN ALCOHOLIC DRINK FIRST THING IN THE MORNING TO GET YOURSELF GOING AFTER A NIGHT OF HEAVY DRINKING: 0
HOW OFTEN DURING THE LAST YEAR HAVE YOU FAILED TO DO WHAT WAS NORMALLY EXPECTED FROM YOU BECAUSE OF DRINKING: 0
HOW OFTEN DO YOU HAVE A DRINK CONTAINING ALCOHOL: 4
HOW OFTEN DO YOU HAVE A DRINK CONTAINING ALCOHOL: FOUR OR MORE TIMES A WEEK
HAVE YOU OR SOMEONE ELSE BEEN INJURED AS A RESULT OF YOUR DRINKING: 0
HOW OFTEN DURING THE LAST YEAR HAVE YOU HAD A FEELING OF GUILT OR REMORSE AFTER DRINKING: 0
AUDIT-C TOTAL SCORE: 4
HOW OFTEN DURING THE LAST YEAR HAVE YOU NEEDED AN ALCOHOLIC DRINK FIRST THING IN THE MORNING TO GET YOURSELF GOING AFTER A NIGHT OF HEAVY DRINKING: NEVER
HAVE YOU OR SOMEONE ELSE BEEN INJURED AS A RESULT OF YOUR DRINKING: NO
HOW OFTEN DO YOU HAVE SIX OR MORE DRINKS ON ONE OCCASION: NEVER
AUDIT TOTAL SCORE: 0
HOW OFTEN DURING THE LAST YEAR HAVE YOU FOUND THAT YOU WERE NOT ABLE TO STOP DRINKING ONCE YOU HAD STARTED: NEVER
HOW OFTEN DURING THE LAST YEAR HAVE YOU BEEN UNABLE TO REMEMBER WHAT HAPPENED THE NIGHT BEFORE BECAUSE YOU HAD BEEN DRINKING: NEVER
HOW MANY STANDARD DRINKS CONTAINING ALCOHOL DO YOU HAVE ON A TYPICAL DAY: ONE OR TWO
HOW OFTEN DURING THE LAST YEAR HAVE YOU BEEN UNABLE TO REMEMBER WHAT HAPPENED THE NIGHT BEFORE BECAUSE YOU HAD BEEN DRINKING: 0
HOW OFTEN DURING THE LAST YEAR HAVE YOU FAILED TO DO WHAT WAS NORMALLY EXPECTED FROM YOU BECAUSE OF DRINKING: NEVER
HOW OFTEN DURING THE LAST YEAR HAVE YOU HAD A FEELING OF GUILT OR REMORSE AFTER DRINKING: NEVER
HAS A RELATIVE, FRIEND, DOCTOR, OR ANOTHER HEALTH PROFESSIONAL EXPRESSED CONCERN ABOUT YOUR DRINKING OR SUGGESTED YOU CUT DOWN: NO
HOW MANY STANDARD DRINKS CONTAINING ALCOHOL DO YOU HAVE ON A TYPICAL DAY: 0

## 2021-09-23 ASSESSMENT — PATIENT HEALTH QUESTIONNAIRE - PHQ9
SUM OF ALL RESPONSES TO PHQ QUESTIONS 1-9: 0
SUM OF ALL RESPONSES TO PHQ9 QUESTIONS 1 & 2: 0
2. FEELING DOWN, DEPRESSED OR HOPELESS: 0
SUM OF ALL RESPONSES TO PHQ QUESTIONS 1-9: 0
1. LITTLE INTEREST OR PLEASURE IN DOING THINGS: 0
SUM OF ALL RESPONSES TO PHQ QUESTIONS 1-9: 0

## 2021-09-30 ENCOUNTER — OFFICE VISIT (OUTPATIENT)
Dept: PRIMARY CARE CLINIC | Age: 66
End: 2021-09-30
Payer: MEDICARE

## 2021-09-30 VITALS
WEIGHT: 215.4 LBS | HEIGHT: 72 IN | DIASTOLIC BLOOD PRESSURE: 84 MMHG | HEART RATE: 64 BPM | OXYGEN SATURATION: 100 % | TEMPERATURE: 97.3 F | BODY MASS INDEX: 29.17 KG/M2 | SYSTOLIC BLOOD PRESSURE: 136 MMHG

## 2021-09-30 DIAGNOSIS — I71.40 ABDOMINAL ANEURYSM: ICD-10-CM

## 2021-09-30 DIAGNOSIS — Z13.220 SCREENING FOR HYPERLIPIDEMIA: ICD-10-CM

## 2021-09-30 DIAGNOSIS — Z23 NEED FOR INFLUENZA VACCINATION: ICD-10-CM

## 2021-09-30 DIAGNOSIS — F51.01 PRIMARY INSOMNIA: ICD-10-CM

## 2021-09-30 DIAGNOSIS — Z00.00 ROUTINE GENERAL MEDICAL EXAMINATION AT A HEALTH CARE FACILITY: Primary | ICD-10-CM

## 2021-09-30 PROCEDURE — G0439 PPPS, SUBSEQ VISIT: HCPCS | Performed by: FAMILY MEDICINE

## 2021-09-30 PROCEDURE — G0008 ADMIN INFLUENZA VIRUS VAC: HCPCS | Performed by: FAMILY MEDICINE

## 2021-09-30 PROCEDURE — 90694 VACC AIIV4 NO PRSRV 0.5ML IM: CPT | Performed by: FAMILY MEDICINE

## 2021-09-30 SDOH — ECONOMIC STABILITY: FOOD INSECURITY: WITHIN THE PAST 12 MONTHS, THE FOOD YOU BOUGHT JUST DIDN'T LAST AND YOU DIDN'T HAVE MONEY TO GET MORE.: NEVER TRUE

## 2021-09-30 SDOH — ECONOMIC STABILITY: FOOD INSECURITY: WITHIN THE PAST 12 MONTHS, YOU WORRIED THAT YOUR FOOD WOULD RUN OUT BEFORE YOU GOT MONEY TO BUY MORE.: NEVER TRUE

## 2021-09-30 ASSESSMENT — SOCIAL DETERMINANTS OF HEALTH (SDOH): HOW HARD IS IT FOR YOU TO PAY FOR THE VERY BASICS LIKE FOOD, HOUSING, MEDICAL CARE, AND HEATING?: NOT HARD AT ALL

## 2021-09-30 NOTE — PATIENT INSTRUCTIONS
Personalized Preventive Plan for Maria Anderson - 9/30/2021  Medicare offers a range of preventive health benefits. Some of the tests and screenings are paid in full while other may be subject to a deductible, co-insurance, and/or copay. Some of these benefits include a comprehensive review of your medical history including lifestyle, illnesses that may run in your family, and various assessments and screenings as appropriate. After reviewing your medical record and screening and assessments performed today your provider may have ordered immunizations, labs, imaging, and/or referrals for you. A list of these orders (if applicable) as well as your Preventive Care list are included within your After Visit Summary for your review. Other Preventive Recommendations:    · A preventive eye exam performed by an eye specialist is recommended every 1-2 years to screen for glaucoma; cataracts, macular degeneration, and other eye disorders. · A preventive dental visit is recommended every 6 months. · Try to get at least 150 minutes of exercise per week or 10,000 steps per day on a pedometer . · Order or download the FREE \"Exercise & Physical Activity: Your Everyday Guide\" from [employer]. Call 9-373.624.7877 or search [employer] online. · You need 8966-0100 mg of calcium and 1613-8358 IU of vitamin D per day. It is possible to meet your calcium requirement with diet alone, but a vitamin D supplement is usually necessary to meet this goal.  · When exposed to the sun, use a sunscreen that protects against both UVA and UVB radiation with an SPF of 30 or greater. Reapply every 2 to 3 hours or after sweating, drying off with a towel, or swimming. · Always wear a seat belt when traveling in a car. Always wear a helmet when riding a bicycle or motorcycle.

## 2021-09-30 NOTE — PROGRESS NOTES
Medicare Annual Wellness Visit  Name: Blair Quinones Date: 10/1/2021   MRN: [id number] Sex: Male   Age: 77 y.o. Ethnicity: Non- / Non    : 1955 Race: White (non-)      Desire Aj is here for Medicare AWV and Hypertension (patient has been having some hypertension recently. Today his BP is 136/84. )    Screenings for behavioral, psychosocial and functional/safety risks, and cognitive dysfunction are all negative except as indicated below. These results, as well as other patient data from the [address] [facility] [address] form, are documented in Flowsheets linked to this Encounter. No Known Allergies      Prior to Visit Medications    Medication Sig Taking? Authorizing Provider   zolpidem (AMBIEN) 10 MG tablet Take 1 tablet by mouth nightly as needed for Sleep for up to 90 days. Yes Mae Jules MD   cloNIDine (CATAPRES) 0.1 MG tablet TAKE 1 TABLET BY MOUTH EVERY DAY AT NIGHT Yes ZAY Kearns   fenofibrate (TRIGLIDE) 160 MG tablet TAKE 1 TABLET EVERY DAY Yes ZAY Winkler   atorvastatin (LIPITOR) 20 MG tablet TAKE 1 TABLET EVERY EVENING Yes ZAY Winkler   irbesartan (AVAPRO) 150 MG tablet TAKE 1/2 TABLET EVERY MORNING AND TAKE 1 TABLET AT BEDTIME  Patient taking differently: Take 150 mg by mouth 2 times daily  Yes ZAY Winkler   Enzalutamide (XTANDI PO) Take 80 mg by mouth daily  Yes Historical Provider, MD   leuprolide acetate, 6 Month, (ELIGARD) 45 MG injection Inject 45 mg into the skin once Yes Historical Provider, MD   VITAMIN D PO Take by mouth Patient uncertain of dosing.  Yes Historical Provider, MD   Zinc Sulfate (ZINC 15 PO) Take by mouth Yes Historical Provider, MD   Probiotic Product (PROBIOTIC ADVANCED PO) Take by mouth daily  Yes Historical Provider, MD   Ascorbic Acid (VITAMIN C) 1000 MG tablet Take 1,000 mg by mouth daily Yes Historical Provider, MD   Multiple Vitamin (MULTIVITAMIN) capsule Take 1 capsule by mouth daily  Yes Historical Provider, MD         Past Medical History:   Diagnosis Date    Arthritis     OSTEO    CKD (chronic kidney disease) stage 3, GFR 30-59 ml/min ([facility]) 8/10/2017    Hyperglycemia 8/10/2017    Hyperlipidemia     Hypertension     Mixed hyperlipidemia 8/10/2017    Prostate carcinoma ([facility] [address] 8/10/2017    Sleep apnea, obstructive 9/8/2020       Past Surgical History:   Procedure Laterality Date    KNEE SURGERY Left     knee ligamentous reconstruction with residual degenerative arthritis    PARATHYROIDECTOMY Left 09/16/2016    parathyroid resection single tumor removal left inferior/adenoma with associated hyperparathyroidism     PROSTATECTOMY  2012    TONSILLECTOMY      TOTAL KNEE ARTHROPLASTY Left 5/17/2017    KNEE TOTAL ARTHROPLASTY performed by Harley Mclean MD at [facility] History   Problem Relation Age of Onset    Cancer Mother 52        large cell nonkeratinizing squamous cell of the pancreas                          COPD Father     Cancer Father         Hodgkin's disease head, face, neck    Stroke Father         ischemic       CareTeam (Including outside providers/suppliers regularly involved in providing care):   Patient Care Team:  Carrillo Garcia MD as PCP - General (Family Medicine)  Carrillo Garcia MD as PCP - REHABILITATION [facility] Empaneled Provider  Avni Shoemaker MD as Consulting Physician (Interventional Cardiology)  Josh Bui MD as Consulting Physician (Neurology)    Wt Readings from Last 3 Encounters:   09/30/21 215 lb 6.4 oz (97.7 kg)   09/13/21 213 lb (96.6 kg)   08/16/21 216 lb (98 kg)     Vitals:    09/30/21 0859   BP: 136/84   Pulse: 64   Temp: 97.3 °F (36.3 °C)   SpO2: 100%   Weight: 215 lb 6.4 oz (97.7 kg)   Height: 6' (1.829 m)     Body mass index is 29.21 kg/m². Based upon direct observation of the patient, evaluation of cognition reveals? Recent memory affected by chemo drugs. He states he keeps a lists now.     General Appearance: alert and General Health Risk Interventions:  · Bursitis in R hip - saw Dr Giuseppe Fan, received a steroid shot 3 weeks agao      Safety:  Safety  Do you have working smoke detectors?: Yes  Have all throw rugs been removed or fastened?: (!) No  Do you have non-slip mats or surfaces in all bathtubs/showers?: Yes  Do all of your stairways have a railing or banister?: Yes  Are your doorways, halls and stairs free of clutter?: Yes  Do you always fasten your seatbelt when you are in a car?: Yes  Safety Interventions:  · Home safety tips provided  · lives in a one level house, house in [place] has area rugs but  has  pads under it     Personalized Preventive Plan   Current Health Maintenance Status  Immunization History   Administered Date(s) Administered    COVID-19, Moderna, PF, 100mcg/0.5mL 03/11/2021, 04/02/2021    Influenza Vaccine, unspecified formulation 10/17/2018    Influenza Virus Vaccine 11/02/2012    Influenza, Neal Staple, IM, PF (6 mo and older Fluzone, Flulaval, Fluarix, and 3 yrs and older Afluria) 10/30/2017, 10/31/2019    Influenza, Quadv, adjuvanted, 65 yrs +, IM, PF (Fluad) 09/23/2020, 09/30/2021    Pneumococcal Conjugate 13-valent ([id number]) 05/01/2019    Pneumococcal Polysaccharide ([id number]) 09/23/2020    Tdap (Boostrix, Adacel) 11/12/2018        Health Maintenance   Topic Date Due    Shingles Vaccine (1 of 2) Never done   [employer] Visit (AWV)  Never done    Lipid screen  08/03/2021    PSA counseling  10/05/2021    Potassium monitoring  11/10/2021    Creatinine monitoring  11/10/2021    A1C test (Diabetic or Prediabetic)  06/07/2022    Colon cancer screen colonoscopy  04/15/2024    DTaP/Tdap/Td vaccine (2 - Td or Tdap) 11/12/2028    Flu vaccine  Completed    Pneumococcal 65+ years Vaccine  Completed    COVID-19 Vaccine  Completed    Hepatitis C screen  Completed    Hepatitis A vaccine  Aged Out    Hepatitis B vaccine  Aged Out    Hib vaccine  Aged Out    Meningococcal (ACWY) vaccine  Aged Out     Recommendations for [employer] Due: see orders and patient instructions/AVS.  . Recommended screening schedule for the next 5-10 years is provided to the patient in written form: see Patient Instructions/AVS.    Monserrat Vanegas was seen today for medicare awv and hypertension. Diagnoses and all orders for this visit:    Routine general medical examination at a health care facility  -     Basic Metabolic Panel; Future    Need for influenza vaccination  -     INFLUENZA, QUADV, ADJUVANTED, 65 YRS =, IM, PF, PREFILL SYR, 0.5ML (FLUAD)    Primary insomnia  -     zolpidem (AMBIEN) 10 MG tablet; Take 1 tablet by mouth nightly as needed for Sleep for up to 90 days. Screening for hyperlipidemia  -     Lipid, Fasting; Future    Abdominal aneurysm ([address]  -     CT ABDOMEN W WO CONTRAST; Future           Continue present care and management  Continue all maintenance medications  Encouraged to continue healthy lifestyle change  Engage in regular exercise daily -30 minutes a day as tolerated  Stay well and hydrated - drink at least 64 oz of fluid a day  Eat 6 servings of fruit and vegetables daily  Recommend Shingles vaccine  Keep scheduled follow-up appt with me and other providers/specialists  Call with new concerns      Cardiovascular Disease Risk Counseling: Assessed the patient's risk to develop cardiovascular disease and reviewed main risk factors.    Reviewed steps to reduce disease risk including:   · Quitting tobacco use, reducing amount smoked, or not starting the habit  · Making healthy food choices  · Being physically active and gradualy increasing activity levels   · Reduce weight and determine a healthy BMI goal  · Monitor blood pressure and treat if higher than 140/90 mmHg  · Maintain blood total cholesterol levels under 5 mmol/l or 190 mg/dl  · Maintain LDL cholesterol levels under 3.0 mmol/l or 115 mg/dl   · Control blood glucose levels  · Consider taking aspirin (75 mg daily), once blood pressure is controlled   Provided a follow up plan. Time spent (minutes): 30 minutes    Advance Care Planning   Advanced Care Planning: Discussed the patients choices for care and treatment in case of a health event that adversely affects decision-making abilities. Also discussed the patients long-term treatment options. Reviewed the process of designating a Health Care Surrogate as defined by the [facility]. Reviewed the [facility] Will Directive process and the kinds of life-sustaining treatments the patient would like to receive should they become terminally ill or permanently unconscious. Explained the state requirement to complete the forms in the presence of two eligible witnesses OR in the presence of a . Patient was asked to provide a copy of the signed forms to the practice office.   Time spent (minutes): 5 minutes  Pt has LIVING WILL - he is a FULL CODE  NICHO Florez (wife)

## 2021-09-30 NOTE — PROGRESS NOTES
Vaccine Information Sheet, \"Influenza - Inactivated\" OR \"Live - Intranasal\"  given to Kat Alvarado, or parent/legal guardian of  Kat Alvarado and verbalized understanding. Patient responses:    Have you ever had a reaction to a flu vaccine? No  Are you able to eat eggs without adverse effects? Yes  Do you have any current illness? No  Have you ever had [place] Syndrome? No    Flu vaccine given per order. Please see immunization tab.

## 2021-10-01 RX ORDER — ZOLPIDEM TARTRATE 10 MG/1
10 TABLET ORAL NIGHTLY PRN
Qty: 30 TABLET | Refills: 2 | Status: SHIPPED | OUTPATIENT
Start: 2021-10-01 | End: 2021-12-30

## 2021-10-08 ENCOUNTER — HOSPITAL ENCOUNTER (OUTPATIENT)
Dept: CT IMAGING | Age: 66
Discharge: HOME | End: 2021-10-08
Payer: MEDICARE

## 2021-10-08 DIAGNOSIS — I71.40 ABDOMINAL ANEURYSM: ICD-10-CM

## 2021-10-08 PROCEDURE — 74178 CT ABD&PLV WO CNTR FLWD CNTR: CPT

## 2021-10-08 PROCEDURE — 6360000004 HC RX CONTRAST MEDICATION: Performed by: FAMILY MEDICINE

## 2021-10-08 RX ADMIN — IOPAMIDOL 75 ML: 755 INJECTION, SOLUTION INTRAVENOUS at 08:22

## 2021-10-26 ENCOUNTER — TELEPHONE (OUTPATIENT)
Dept: PRIMARY CARE CLINIC | Age: 66
End: 2021-10-26

## 2021-10-26 NOTE — TELEPHONE ENCOUNTER
----- Message from Payton Leal MD sent at 10/25/2021  5:18 PM CDT -----  Please advise to make a follow-up appt to discuss result of CT abdomen. Radiology report too extensive and will be best explained to pt in person.

## 2021-10-26 NOTE — TELEPHONE ENCOUNTER
----- Message from Kika Rodriguez MD sent at 10/25/2021  4:51 PM CDT -----  Please advise pt to make a follow-up visit to discuss recent blood work

## 2021-10-27 ENCOUNTER — OFFICE VISIT (OUTPATIENT)
Dept: PRIMARY CARE CLINIC | Age: 66
End: 2021-10-27
Payer: MEDICARE

## 2021-10-27 VITALS
OXYGEN SATURATION: 98 % | HEIGHT: 72 IN | WEIGHT: 215 LBS | HEART RATE: 82 BPM | DIASTOLIC BLOOD PRESSURE: 102 MMHG | BODY MASS INDEX: 29.12 KG/M2 | SYSTOLIC BLOOD PRESSURE: 146 MMHG

## 2021-10-27 DIAGNOSIS — I71.40 ABDOMINAL AORTIC ANEURYSM (AAA) 3.0 CM TO 5.5 CM IN DIAMETER IN MALE: ICD-10-CM

## 2021-10-27 DIAGNOSIS — F51.04 INSOMNIA, PSYCHOPHYSIOLOGICAL: ICD-10-CM

## 2021-10-27 DIAGNOSIS — I10 ESSENTIAL HYPERTENSION: ICD-10-CM

## 2021-10-27 DIAGNOSIS — E78.2 MIXED HYPERLIPIDEMIA: Primary | ICD-10-CM

## 2021-10-27 DIAGNOSIS — C61 PROSTATE CANCER (HCC): ICD-10-CM

## 2021-10-27 PROCEDURE — 1036F TOBACCO NON-USER: CPT | Performed by: FAMILY MEDICINE

## 2021-10-27 PROCEDURE — 1123F ACP DISCUSS/DSCN MKR DOCD: CPT | Performed by: FAMILY MEDICINE

## 2021-10-27 PROCEDURE — 3017F COLORECTAL CA SCREEN DOC REV: CPT | Performed by: FAMILY MEDICINE

## 2021-10-27 PROCEDURE — 4040F PNEUMOC VAC/ADMIN/RCVD: CPT | Performed by: FAMILY MEDICINE

## 2021-10-27 PROCEDURE — 99214 OFFICE O/P EST MOD 30 MIN: CPT | Performed by: FAMILY MEDICINE

## 2021-10-27 PROCEDURE — G8484 FLU IMMUNIZE NO ADMIN: HCPCS | Performed by: FAMILY MEDICINE

## 2021-10-27 PROCEDURE — G8427 DOCREV CUR MEDS BY ELIG CLIN: HCPCS | Performed by: FAMILY MEDICINE

## 2021-10-27 PROCEDURE — G8417 CALC BMI ABV UP PARAM F/U: HCPCS | Performed by: FAMILY MEDICINE

## 2021-10-27 RX ORDER — LABETALOL 100 MG/1
TABLET, FILM COATED ORAL
Qty: 60 TABLET | Refills: 3 | Status: SHIPPED | OUTPATIENT
Start: 2021-10-27 | End: 2021-11-16 | Stop reason: SDUPTHER

## 2021-10-27 ASSESSMENT — ENCOUNTER SYMPTOMS
SHORTNESS OF BREATH: 0
DIARRHEA: 0
NAUSEA: 0
VOMITING: 0
COUGH: 0
CONSTIPATION: 0
ABDOMINAL PAIN: 0
CHEST TIGHTNESS: 0
WHEEZING: 0

## 2021-10-27 NOTE — PROGRESS NOTES
Sunitha Fernandez is a 77 y.o. male who presents today for   Chief Complaint   Patient presents with   [address]     completed 10/8 and discuss CT of abdomen    Hypertension       HPI  Patient is here today to discuss recent lab results and CT image completed on 10/08. Patient also reports his BP has been elevated more frequently and he does monitor his BP regularly at home. He is on a new chemo med which affects BP, LDL, TG, and has other common SE. He is not sleeping well and his bp is up. Had CT abdomen showing increase size of AAA to 4.5 from 3.7 prior  The 10-year ASCVD risk score (Fei Ngo, et al., 2013) is: 24.3%    Values used to calculate the score:      Age: 77 years      Sex: Male      Is Non- : No      Diabetic: No      Tobacco smoker: No      Systolic Blood Pressure: 325 mmHg      Is BP treated: Yes      HDL Cholesterol: 40 mg/dL      Total Cholesterol: 213 mg/dL      No change in PMH, family, social, or surgical history unless mentioned above. I have reviewed the above chief complaint and HPI details charted by staff and claim ownership of the documentation. Review of Systems   Constitutional: Negative for chills and fever. Respiratory: Negative for cough, chest tightness, shortness of breath and wheezing. Cardiovascular: Negative for chest pain, palpitations and leg swelling. Gastrointestinal: Negative for abdominal pain, constipation, diarrhea, nausea and vomiting. Genitourinary: Negative for difficulty urinating, dysuria and frequency.        Past Medical History:   Diagnosis Date    Arthritis     OSTEO    CKD (chronic kidney disease) stage 3, GFR 30-59 ml/min ([facility]) 8/10/2017    Hyperglycemia 8/10/2017    Hyperlipidemia     Hypertension     Mixed hyperlipidemia 8/10/2017    Prostate carcinoma ([facility] [address] 8/10/2017    Sleep apnea, obstructive 9/8/2020       Current Outpatient Medications   Medication Sig Dispense Refill    labetalol (NORMODYNE) 100 MG tablet Start 1/2 tab twice daily for 3 days, then increase to 1 tab twice daily. 60 tablet 3    zolpidem (AMBIEN) 10 MG tablet Take 1 tablet by mouth nightly as needed for Sleep for up to 90 days. 30 tablet 2    fenofibrate (TRIGLIDE) 160 MG tablet TAKE 1 TABLET EVERY DAY 90 tablet 3    atorvastatin (LIPITOR) 20 MG tablet TAKE 1 TABLET EVERY EVENING 90 tablet 3    irbesartan (AVAPRO) 150 MG tablet TAKE 1/2 TABLET EVERY MORNING AND TAKE 1 TABLET AT BEDTIME (Patient taking differently: Take 150 mg by mouth 2 times daily ) 135 tablet 3    Enzalutamide (XTANDI PO) Take 80 mg by mouth daily       leuprolide acetate, 6 Month, (ELIGARD) 45 MG injection Inject 45 mg into the skin once      VITAMIN D PO Take by mouth Patient uncertain of dosing.  Zinc Sulfate (ZINC 15 PO) Take by mouth      Probiotic Product (PROBIOTIC ADVANCED PO) Take by mouth daily       Ascorbic Acid (VITAMIN C) 1000 MG tablet Take 1,000 mg by mouth daily      Multiple Vitamin (MULTIVITAMIN) capsule Take 1 capsule by mouth daily       cloNIDine (CATAPRES) 0.1 MG tablet TAKE 1 TABLET BY MOUTH EVERY DAY AT NIGHT (Patient not taking: Reported on 10/27/2021) 90 tablet 0     No current facility-administered medications for this visit.        No Known Allergies    Past Surgical History:   Procedure Laterality Date    KNEE SURGERY Left     knee ligamentous reconstruction with residual degenerative arthritis    PARATHYROIDECTOMY Left 2016    parathyroid resection single tumor removal left inferior/adenoma with associated hyperparathyroidism     PROSTATECTOMY  2012    TONSILLECTOMY      TOTAL KNEE ARTHROPLASTY Left 2017    KNEE TOTAL ARTHROPLASTY performed by Marah Deras MD at [address] [place] [address]       Social History     Tobacco Use    Smoking status: Former Smoker     Packs/day: 1.00     Years: 20.00     Pack years: 20.00     Types: Cigarettes     Quit date: 1992     Years since quittin.5    Smokeless tobacco: Never Used Vaping Use    Vaping Use: Never used   Substance Use Topics    Alcohol use: Yes     Alcohol/week: 2.0 standard drinks     Types: 2 Glasses of wine per week     Comment: DAILY    Drug use: No       Family History   Problem Relation Age of Onset    Cancer Mother 52        large cell nonkeratinizing squamous cell of the pancreas                          COPD Father     Cancer Father         Hodgkin's disease head, face, neck    Stroke Father         ischemic       BP (!) 146/102 (Site: Right Upper Arm, Position: Sitting)   Pulse 82   Ht 6' (1.829 m)   Wt 215 lb (97.5 kg)   SpO2 98%   BMI 29.16 kg/m²     Physical Exam  Vitals and nursing note reviewed. Constitutional:       General: He is not in acute distress. Appearance: He is well-developed. He is not toxic-appearing or diaphoretic. HENT:      Head: Normocephalic and atraumatic. Cardiovascular:      Rate and Rhythm: Normal rate and regular rhythm. Heart sounds: Normal heart sounds. No murmur heard. No friction rub. No gallop. Pulmonary:      Effort: Pulmonary effort is normal. No respiratory distress. Breath sounds: Normal breath sounds. No wheezing or rales. Chest:      Chest wall: No tenderness. Abdominal:      General: Bowel sounds are normal. There is no distension. Palpations: Abdomen is soft. There is no mass. Tenderness: There is no abdominal tenderness. There is no guarding or rebound. Musculoskeletal:      Right lower leg: No edema. Left lower leg: No edema. Skin:     General: Skin is warm and dry. Nails: There is no clubbing. Neurological:      Mental Status: He is alert and oriented to person, place, and time. Coordination: Coordination normal.      Gait: Gait normal.         Assessment:    ICD-10-CM    1. Mixed hyperlipidemia  E78.2    2. Essential hypertension  I10    3.  Abdominal aortic aneurysm (AAA) 3.0 cm to 5.5 cm in diameter in male [facility])  I71.4 Avelina Jose MD, Vascular Surgery, [place]   4. Insomnia, psychophysiological  F51.04    5. Prostate cancer ([facility] [address]  C61        Plan:   Add labetalol for its beta-blocker effects and increased vascular disease risk along with AAA history. Needs to see vascular disease. Will have a short-term follow-up here soon for blood pressure. Hyperlipidemia is uncontrolled and we should recheck in about 6 months based on his additional medication which is causing some any side effects. Hypertension is a concern as it may increase risk of rupture for his AAA. Insomnia likely secondary to concern. Orders Placed This Encounter   Procedures   Maria Luz Delgado MD, Vascular Surgery, [place]     Referral Priority:   Routine     Referral Type:   Eval and Treat     Referral Reason:   Specialty Services Required     Referred to Provider:   Rhonda Mitchell MD     Requested Specialty:   Vascular Surgery     Number of Visits Requested:   1     Orders Placed This Encounter   Medications    labetalol (NORMODYNE) 100 MG tablet     Sig: Start 1/2 tab twice daily for 3 days, then increase to 1 tab twice daily. Dispense:  60 tablet     Refill:  3     There are no discontinued medications. There are no Patient Instructions on file for this visit. Patient given educational handouts and has had all questions answered. Patient voices understanding and agrees to plans along with risks and benefits of plan. Patient isinstructed to continue prior meds, diet, and exercise plans unless instructed otherwise. Patient agrees to follow up as instructed and sooner if needed. Patient agrees to go to ER if condition becomes emergent. Notesmay be completed with dictation device and spelling errors may occur. Materials may be copied and pasted from a notepad outside of EMR, all of which, I, Dr. Poppy Bourne MD, take sole intellectual ownership of and have approved adding to my note.         Return in about 2 weeks (around 11/10/2021) for VV - f/u referal to dayron Culp.

## 2021-11-04 ENCOUNTER — TELEPHONE (OUTPATIENT)
Dept: VASCULAR SURGERY | Age: 66
End: 2021-11-04

## 2021-11-04 ENCOUNTER — OFFICE VISIT (OUTPATIENT)
Dept: VASCULAR SURGERY | Age: 66
End: 2021-11-04
Payer: MEDICARE

## 2021-11-04 VITALS
TEMPERATURE: 97 F | RESPIRATION RATE: 18 BRPM | SYSTOLIC BLOOD PRESSURE: 110 MMHG | DIASTOLIC BLOOD PRESSURE: 72 MMHG | HEART RATE: 64 BPM | OXYGEN SATURATION: 97 %

## 2021-11-04 DIAGNOSIS — I71.40 AAA (ABDOMINAL AORTIC ANEURYSM) WITHOUT RUPTURE: Primary | ICD-10-CM

## 2021-11-04 PROCEDURE — G8484 FLU IMMUNIZE NO ADMIN: HCPCS | Performed by: NURSE PRACTITIONER

## 2021-11-04 PROCEDURE — 1036F TOBACCO NON-USER: CPT | Performed by: NURSE PRACTITIONER

## 2021-11-04 PROCEDURE — 1123F ACP DISCUSS/DSCN MKR DOCD: CPT | Performed by: NURSE PRACTITIONER

## 2021-11-04 PROCEDURE — 4040F PNEUMOC VAC/ADMIN/RCVD: CPT | Performed by: NURSE PRACTITIONER

## 2021-11-04 PROCEDURE — 3017F COLORECTAL CA SCREEN DOC REV: CPT | Performed by: NURSE PRACTITIONER

## 2021-11-04 PROCEDURE — 99213 OFFICE O/P EST LOW 20 MIN: CPT | Performed by: NURSE PRACTITIONER

## 2021-11-04 PROCEDURE — G8417 CALC BMI ABV UP PARAM F/U: HCPCS | Performed by: NURSE PRACTITIONER

## 2021-11-04 PROCEDURE — G8427 DOCREV CUR MEDS BY ELIG CLIN: HCPCS | Performed by: NURSE PRACTITIONER

## 2021-11-04 NOTE — TELEPHONE ENCOUNTER
I had called the pt to let him know thst the provider discuss his case with , and at this point we will wait 6 months and repeat study and office visit.

## 2021-11-04 NOTE — PROGRESS NOTES
Sammy Thomas (:  1955) is a 77 y.o. male,Established patient, here for evaluation of the following chief complaint(s):  New Patient (AAA)            SUBJECTIVE/OBJECTIVE:  He has a known history of abdominal aortic aneurysm for 1 - 5 years. He has not had abdominal pain. He has not had back pain in the cervical spine, thoracic spine, lumbar spine and sacral spine region. This pain is unchanged since the last visit. Pain is rated as 0. Sammy Thomas is a 77 y.o. male with the following history as recorded in [facility]:  Patient Active Problem List    Diagnosis Date Noted    Stable angina pectoris ([facility] 2021    Sleep apnea, obstructive 2020    Elevated serum creatinine 10/07/2019    History of acute pancreatitis 10/07/2019    Left upper quadrant pain 10/07/2019    Abnormal finding of blood chemistry  10/07/2019    Hypertriglyceridemia 10/07/2019    Need for prophylactic vaccination and inoculation against varicella 10/07/2019    Constipation 10/07/2019    Abdominal aortic aneurysm (AAA) without rupture ([address] 10/07/2019    Peripheral thrombosis ([address] 10/07/2019    Bilateral inguinal hernia without obstruction or gangrene 10/07/2019    History of prostatectomy 10/07/2019    Elevated d-dimer 10/07/2019    Medication refill 2019    Chest pain 2019    Pulmonary nodule 2019    Right nephrolithiasis 2019    Prostate cancer ([address] 2019    Actinic keratosis 2018    Hypertension 10/23/2017    CKD (chronic kidney disease) stage 3, GFR 30-59 ml/min ([address] 08/10/2017    Hyperparathyroidism, primary ([address] 08/10/2017    Mixed hyperlipidemia 08/10/2017    Hyperglycemia 08/10/2017     Current Outpatient Medications   Medication Sig Dispense Refill    zolpidem (AMBIEN) 10 MG tablet Take 1 tablet by mouth nightly as needed for Sleep for up to 90 days.  30 tablet 2    fenofibrate (TRIGLIDE) 160 MG tablet TAKE 1 TABLET EVERY DAY 90 tablet 3    atorvastatin (LIPITOR) 20 MG tablet TAKE 1 TABLET EVERY EVENING 90 tablet 3    irbesartan (AVAPRO) 150 MG tablet TAKE 1/2 TABLET EVERY MORNING AND TAKE 1 TABLET AT BEDTIME (Patient taking differently: Take 150 mg by mouth 2 times daily ) 135 tablet 3    Enzalutamide (XTANDI PO) Take 80 mg by mouth daily       leuprolide acetate, 6 Month, (ELIGARD) 45 MG injection Inject 45 mg into the skin once      VITAMIN D PO Take by mouth Patient uncertain of dosing.  Zinc Sulfate (ZINC 15 PO) Take by mouth      Probiotic Product (PROBIOTIC ADVANCED PO) Take by mouth daily       Ascorbic Acid (VITAMIN C) 1000 MG tablet Take 1,000 mg by mouth daily      Multiple Vitamin (MULTIVITAMIN) capsule Take 1 capsule by mouth daily       labetalol (NORMODYNE) 100 MG tablet Start 1/2 tab twice daily for 3 days, then increase to 1 tab twice daily. (Patient not taking: Reported on 11/4/2021) 60 tablet 3     No current facility-administered medications for this visit. Allergies: Patient has no known allergies.   Past Medical History:   Diagnosis Date    Arthritis     OSTEO    CKD (chronic kidney disease) stage 3, GFR 30-59 ml/min ([facility]) 8/10/2017    Hyperglycemia 8/10/2017    Hyperlipidemia     Hypertension     Mixed hyperlipidemia 8/10/2017    Prostate carcinoma ([facility] 8/10/2017    Sleep apnea, obstructive 9/8/2020     Past Surgical History:   Procedure Laterality Date    KNEE SURGERY Left     knee ligamentous reconstruction with residual degenerative arthritis    PARATHYROIDECTOMY Left 09/16/2016    parathyroid resection single tumor removal left inferior/adenoma with associated hyperparathyroidism     PROSTATECTOMY  2012    TONSILLECTOMY      TOTAL KNEE ARTHROPLASTY Left 5/17/2017    KNEE TOTAL ARTHROPLASTY performed by Zee Grace MD at [facility]     Family History   Problem Relation Age of Onset    Cancer Mother 52        large cell nonkeratinizing squamous cell of the pancreas                          COPD Father     Cancer Father         Hodgkin's disease head, face, neck    Stroke Father         ischemic     Social History     Tobacco Use    Smoking status: Former Smoker     Packs/day: 1.00     Years: 20.00     Pack years: 20.00     Types: Cigarettes     Quit date: 1992     Years since quittin.5    Smokeless tobacco: Never Used   Substance Use Topics    Alcohol use: Yes     Alcohol/week: 2.0 standard drinks     Types: 2 Glasses of wine per week     Comment: DAILY       ROS  Eyes  no sudden vision change or amaurosis. Respiratory  no significant shortness of breath,  Cardiovascular  no chest pain or syncope. No  significant leg swelling. No claudication. Musculoskeletal  no gait disturbance  Skin  no new wound. Neurologic   No speech difficulty or lateralizing weakness. All other review of systems are negative. Physical Exam    /72 Comment: right  Pulse 64   Temp 97 °F (36.1 °C)   Resp 18   SpO2 97%       Neck- carotid pulses 2+ to palpation with no bruit  Cardiovascular  Regular rate and rhythm. Pulmonary  effort appears normal.  No respiratory distress. Lungs - Breath sounds normal. No wheezes or rales. GI - Abdomen  soft, non tender, bowel sounds X 4 quadrants. No guarding or rebound tenderness. No distension or palpable mass. Extremities -  Radial and brachial pulses are 2+ to palpation bilaterally. Right femoral pulse: present 2+; Right popliteal pulse: absent Right DP: present 1+; Right PT absent; Left femoral pulse: present 2+; Left popliteal pulse: absent; Left DP: present 1+; Left PT: absent No cyanosis, clubbing, or significant edema. No signs atheroembolic event. Neurologic  alert and oriented X 3. Physiologic. Face symmetric. Skin  warm, dry, and intact.   no wound  Psychiatric  mood, affect, and behavior appear normal.  Judgment and thought processes appear normal.    CT -   A significant increase in size of the infrarenal abdominal   aortic aneurysm as detailed above. Increase in size of a right renal cyst. Multiple low density nodules   in the kidneys bilaterally as detailed above. The measured larger   nodules appear cystic. The remaining smaller low-density cortical   nodules are too small to numerous to be individually characterized. Another follow-up examination in 6 months to a year may be obtained to   ensure stability. Individual films reviewed: Yes. Reviewed by Dr. Anjelica Carroll as well  These results were reviewed with the patient. Disease process has progressed      Reviewed previous studies including: CT scan  Individual images were reviewed. I agree with the findings  Results were discussed with the patient. ASSESSMENT/PLAN:  1. AAA (abdominal aortic aneurysm) without rupture (HCC)          Strongly encourage start/continue statin therapy -   Recommend no smoking   Proceed with 6 months with ct per Dr. Anjelica Carroll        Symptoms of rupture reviewed with the patient including sudden onset severe back pain or abdominal pain. This pain can sometimes radiate into the groin or leg. The patient may experience a feeling of impending doom or death. If this occurs they have been insturcted to call 911 and get to the emergency room telling them you have an aneurysm. Patient has voiced understanding. An electronic signature was used to authenticate this note.     --ZAY Muse

## 2021-11-16 ENCOUNTER — VIRTUAL VISIT (OUTPATIENT)
Dept: PRIMARY CARE CLINIC | Age: 66
End: 2021-11-16
Payer: MEDICARE

## 2021-11-16 DIAGNOSIS — N18.31 STAGE 3A CHRONIC KIDNEY DISEASE (HCC): ICD-10-CM

## 2021-11-16 DIAGNOSIS — I71.40 ABDOMINAL AORTIC ANEURYSM (AAA) WITHOUT RUPTURE: Primary | ICD-10-CM

## 2021-11-16 DIAGNOSIS — I10 ESSENTIAL HYPERTENSION: ICD-10-CM

## 2021-11-16 PROCEDURE — G8428 CUR MEDS NOT DOCUMENT: HCPCS | Performed by: FAMILY MEDICINE

## 2021-11-16 PROCEDURE — G8417 CALC BMI ABV UP PARAM F/U: HCPCS | Performed by: FAMILY MEDICINE

## 2021-11-16 PROCEDURE — G8484 FLU IMMUNIZE NO ADMIN: HCPCS | Performed by: FAMILY MEDICINE

## 2021-11-16 PROCEDURE — 1036F TOBACCO NON-USER: CPT | Performed by: FAMILY MEDICINE

## 2021-11-16 PROCEDURE — 1123F ACP DISCUSS/DSCN MKR DOCD: CPT | Performed by: FAMILY MEDICINE

## 2021-11-16 PROCEDURE — 3017F COLORECTAL CA SCREEN DOC REV: CPT | Performed by: FAMILY MEDICINE

## 2021-11-16 PROCEDURE — 4040F PNEUMOC VAC/ADMIN/RCVD: CPT | Performed by: FAMILY MEDICINE

## 2021-11-16 PROCEDURE — 99213 OFFICE O/P EST LOW 20 MIN: CPT | Performed by: FAMILY MEDICINE

## 2021-11-16 RX ORDER — LABETALOL 100 MG/1
100 TABLET, FILM COATED ORAL 2 TIMES DAILY
Qty: 180 TABLET | Refills: 3 | Status: SHIPPED | OUTPATIENT
Start: 2021-11-16 | End: 2022-01-21

## 2021-11-16 NOTE — PROGRESS NOTES
2021    TELEHEALTH EVALUATION -- Audio/Visual (During GKUCV-36 public health emergency)    HPI:    Mazin Schafer (:  1955) has requested an audio/video evaluation for the following concern(s):    Patient presents today via video visit for f/u BP. Doing better on labetolol. On a monitoring system w/ Dr. Amber Jensen and team.  F/u ct in 6 months    Review of Systems    Prior to Visit Medications    Medication Sig Taking? Authorizing Provider   labetalol (NORMODYNE) 100 MG tablet Start 1/2 tab twice daily for 3 days, then increase to 1 tab twice daily. Patient not taking: Reported on 2021  Jose Raul Cody MD   zolpidem (AMBIEN) 10 MG tablet Take 1 tablet by mouth nightly as needed for Sleep for up to 90 days. Keren Fletcher MD   fenofibrate (TRIGLIDE) 160 MG tablet TAKE 1 TABLET EVERY DAY  ZAY Burgess   atorvastatin (LIPITOR) 20 MG tablet TAKE 1 TABLET EVERY EVENING  ZAY Murillo   irbesartan (AVAPRO) 150 MG tablet TAKE 1/2 TABLET EVERY MORNING AND TAKE 1 TABLET AT BEDTIME  Patient taking differently: Take 150 mg by mouth 2 times daily   ZAY Burgess   Enzalutamide (XTANDI PO) Take 80 mg by mouth daily   Historical Provider, MD   leuprolide acetate, 6 Month, (ELIGARD) 45 MG injection Inject 45 mg into the skin once  Historical Provider, MD   VITAMIN D PO Take by mouth Patient uncertain of dosing.   Historical Provider, MD   Zinc Sulfate (ZINC 15 PO) Take by mouth  Historical Provider, MD   Probiotic Product (PROBIOTIC ADVANCED PO) Take by mouth daily   Historical Provider, MD   Ascorbic Acid (VITAMIN C) 1000 MG tablet Take 1,000 mg by mouth daily  Historical Provider, MD   Multiple Vitamin (MULTIVITAMIN) capsule Take 1 capsule by mouth daily   Historical Provider, MD       Social History     Tobacco Use    Smoking status: Former Smoker     Packs/day: 1.00     Years: 20.00     Pack years: 20.00     Types: Cigarettes     Quit date: 1992     Years since quittin.6    Smokeless tobacco: Never Used   Vaping Use    Vaping Use: Never used   Substance Use Topics    Alcohol use: Yes     Alcohol/week: 2.0 standard drinks     Types: 2 Glasses of wine per week     Comment: DAILY    Drug use: No        No Known Allergies,   Past Medical History:   Diagnosis Date    Arthritis     OSTEO    CKD (chronic kidney disease) stage 3, GFR 30-59 ml/min [facility] 8/10/2017    Hyperglycemia 8/10/2017    Hyperlipidemia     Hypertension     Mixed hyperlipidemia 8/10/2017    Prostate carcinoma ([facility] [address] 8/10/2017    Sleep apnea, obstructive 2020   ,   Past Surgical History:   Procedure Laterality Date    KNEE SURGERY Left     knee ligamentous reconstruction with residual degenerative arthritis    PARATHYROIDECTOMY Left 2016    parathyroid resection single tumor removal left inferior/adenoma with associated hyperparathyroidism     PROSTATECTOMY  2012    TONSILLECTOMY      TOTAL KNEE ARTHROPLASTY Left 2017    KNEE TOTAL ARTHROPLASTY performed by April Vaughan MD at [address] [place] [address]   ,   Social History     Tobacco Use    Smoking status: Former Smoker     Packs/day: 1.00     Years: 20.00     Pack years: 20.00     Types: Cigarettes     Quit date: 1992     Years since quittin.6    Smokeless tobacco: Never Used   Vaping Use    Vaping Use: Never used   Substance Use Topics    Alcohol use:  Yes     Alcohol/week: 2.0 standard drinks     Types: 2 Glasses of wine per week     Comment: DAILY    Drug use: No   ,   Family History   Problem Relation Age of Onset    Cancer Mother 52        large cell nonkeratinizing squamous cell of the pancreas                          COPD Father     Cancer Father         Hodgkin's disease head, face, neck    Stroke Father         ischemic   ,   Immunization History   Administered Date(s) Administered    COVID-19, Moisés Grayson, Primary or Immunocompromised, PF, 100mcg/0.5mL 2021, 2021    Influenza Vaccine, unspecified mass     Pulmonary/Chest: [x] Respiratory effort normal.  [x] No visualized signs of difficulty breathing or respiratory distress        [] Abnormal-      Musculoskeletal:   [x] Normal gait with no signs of ataxia         [x] Normal range of motion of neck        [] Abnormal-       Neurological:        [x] No Facial Asymmetry (Cranial nerve 7 motor function) (limited exam to video visit)          [x] No gaze palsy        [] Abnormal-         Skin:        [x] No significant exanthematous lesions or discoloration noted on facial skin         [] Abnormal-            Psychiatric:       [x] Normal Affect [x] No Hallucinations        [] Abnormal-     Other pertinent observable physical exam findings-     ASSESSMENT/PLAN:    ICD-10-CM    1. Abdominal aortic aneurysm (AAA) without rupture (HCC)  I71.4    2. Stage 3a chronic kidney disease (HCC)  N18.31    3. Essential hypertension  I10        Improved, continue meds    No orders of the defined types were placed in this encounter. No orders of the defined types were placed in this encounter. Return in about 3 months (around 2/16/2022) for vv f/u AAA and HTN - labetolol. Boris Hooks is a 77 y.o. male being evaluated by a Virtual Visit (video visit) encounter to address concerns as mentioned above. A caregiver was present when appropriate. Due to this being a TeleHealth encounter (During CXF-38 public health emergency), evaluation of the following organ systems was limited: Vitals/Constitutional/EENT/Resp/CV/GI//MS/Neuro/Skin/Heme-Lymph-Imm. Pursuant to the emergency declaration under the [address] [place] [address] authority and the [employer] and [employer] Act, this Virtual Visit was conducted with patient's (and/or legal guardian's) consent, to reduce the patient's risk of exposure to COVID-19 and provide necessary medical care.   The patient (and/or legal guardian) has also been advised to contact this office for worsening conditions or problems, and seek emergency medical treatment and/or call 911 if deemed necessary. Services were provided through a video synchronous discussion virtually to substitute for in-person clinic visit. Patient and provider were located at their individual homes or provider at secure site for business. It is possible that material may be posted from a notepad that is used for a Dragon dictation device for dictating notes outside the EMR and I am the original author of all of this content. --Stiven Bergman MD on 11/16/2021 at 7:29 AM    An electronic signature was used to authenticate this note.

## 2022-01-21 RX ORDER — LABETALOL 100 MG/1
TABLET, FILM COATED ORAL
Qty: 180 TABLET | Refills: 1 | Status: SHIPPED | OUTPATIENT
Start: 2022-01-21 | End: 2022-10-25 | Stop reason: SDUPTHER

## 2022-02-15 ENCOUNTER — TELEMEDICINE (OUTPATIENT)
Dept: PRIMARY CARE CLINIC | Age: 67
End: 2022-02-15
Payer: MEDICARE

## 2022-02-15 DIAGNOSIS — E21.0 HYPERPARATHYROIDISM, PRIMARY (HCC): ICD-10-CM

## 2022-02-15 DIAGNOSIS — I20.8 STABLE ANGINA PECTORIS (HCC): ICD-10-CM

## 2022-02-15 DIAGNOSIS — I10 ESSENTIAL HYPERTENSION: ICD-10-CM

## 2022-02-15 DIAGNOSIS — I74.4: ICD-10-CM

## 2022-02-15 DIAGNOSIS — I71.40 ABDOMINAL AORTIC ANEURYSM (AAA) WITHOUT RUPTURE: Primary | ICD-10-CM

## 2022-02-15 DIAGNOSIS — N18.31 STAGE 3A CHRONIC KIDNEY DISEASE (HCC): ICD-10-CM

## 2022-02-15 DIAGNOSIS — C61 PROSTATE CANCER (HCC): ICD-10-CM

## 2022-02-15 PROCEDURE — 99213 OFFICE O/P EST LOW 20 MIN: CPT | Performed by: FAMILY MEDICINE

## 2022-02-15 PROCEDURE — G8484 FLU IMMUNIZE NO ADMIN: HCPCS | Performed by: FAMILY MEDICINE

## 2022-02-15 PROCEDURE — G8417 CALC BMI ABV UP PARAM F/U: HCPCS | Performed by: FAMILY MEDICINE

## 2022-02-15 PROCEDURE — 1123F ACP DISCUSS/DSCN MKR DOCD: CPT | Performed by: FAMILY MEDICINE

## 2022-02-15 PROCEDURE — 3017F COLORECTAL CA SCREEN DOC REV: CPT | Performed by: FAMILY MEDICINE

## 2022-02-15 PROCEDURE — 1036F TOBACCO NON-USER: CPT | Performed by: FAMILY MEDICINE

## 2022-02-15 PROCEDURE — 4040F PNEUMOC VAC/ADMIN/RCVD: CPT | Performed by: FAMILY MEDICINE

## 2022-02-15 PROCEDURE — G8428 CUR MEDS NOT DOCUMENT: HCPCS | Performed by: FAMILY MEDICINE

## 2022-02-15 ASSESSMENT — ENCOUNTER SYMPTOMS
CONSTIPATION: 0
SHORTNESS OF BREATH: 0
VOMITING: 0
CHEST TIGHTNESS: 0
DIARRHEA: 0
COUGH: 0
NAUSEA: 0
ABDOMINAL PAIN: 0
WHEEZING: 0

## 2022-02-15 NOTE — PROGRESS NOTES
2/15/2022    TELEHEALTH EVALUATION -- Audio/Visual (During XBRTX-16 public health emergency)    HPI:    Cherise Bragg (:  1955) has requested an audio/video evaluation for the following concern(s):    Patient presents today via video visit for labetolol for hypertension and AAA. He is doing well overall. He is transitioning to a new PCP. He notes he is due for a CT scan in April as this has been followed every 6 months for his AAA. Tolerating his regimen well that he notes. Review of Systems   Constitutional: Negative for chills and fever. Respiratory: Negative for cough, chest tightness, shortness of breath and wheezing. Cardiovascular: Negative for chest pain, palpitations and leg swelling. Gastrointestinal: Negative for abdominal pain, constipation, diarrhea, nausea and vomiting. Genitourinary: Negative for difficulty urinating, dysuria and frequency. Prior to Visit Medications    Medication Sig Taking? Authorizing Provider   labetalol (NORMODYNE) 100 MG tablet TAKE 1/2 TABLET BY MOUTH TWICE DAILY FOR 3 DAYS, THEN INCREASE TO 1 TAB TWICE DAILY  Queen Reagan MD   fenofibrate (TRIGLIDE) 160 MG tablet TAKE 1 TABLET EVERY DAY  ZAY Champagne   atorvastatin (LIPITOR) 20 MG tablet TAKE 1 TABLET EVERY EVENING  ZAY Murillo   irbesartan (AVAPRO) 150 MG tablet TAKE 1/2 TABLET EVERY MORNING AND TAKE 1 TABLET AT BEDTIME  Patient taking differently: Take 150 mg by mouth 2 times daily   ZAY Champagne   Enzalutamide (XTANDI PO) Take 80 mg by mouth daily   Historical Provider, MD   leuprolide acetate, 6 Month, (ELIGARD) 45 MG injection Inject 45 mg into the skin once  Historical Provider, MD   VITAMIN D PO Take by mouth Patient uncertain of dosing.   Historical Provider, MD   Zinc Sulfate (ZINC 15 PO) Take by mouth  Historical Provider, MD   Probiotic Product (PROBIOTIC ADVANCED PO) Take by mouth daily   Historical Provider, MD   Ascorbic Acid (VITAMIN C) 1000 MG tablet Take 1,000 mg by mouth daily  Historical Provider, MD   Multiple Vitamin (MULTIVITAMIN) capsule Take 1 capsule by mouth daily   Historical Provider, MD       Social History     Tobacco Use    Smoking status: Former Smoker     Packs/day: 1.00     Years: 20.00     Pack years: 20.00     Types: Cigarettes     Quit date: 1992     Years since quittin.8    Smokeless tobacco: Never Used   Vaping Use    Vaping Use: Never used   Substance Use Topics    Alcohol use: Yes     Alcohol/week: 2.0 standard drinks     Types: 2 Glasses of wine per week     Comment: DAILY    Drug use: No        No Known Allergies,   Past Medical History:   Diagnosis Date    Arthritis     OSTEO    CKD (chronic kidney disease) stage 3, GFR 30-59 ml/min ([facility]) 8/10/2017    Hyperglycemia 8/10/2017    Hyperlipidemia     Hypertension     Mixed hyperlipidemia 8/10/2017    Prostate carcinoma ([facility] 8/10/2017    Sleep apnea, obstructive 2020   ,   Past Surgical History:   Procedure Laterality Date    KNEE SURGERY Left     knee ligamentous reconstruction with residual degenerative arthritis    PARATHYROIDECTOMY Left 2016    parathyroid resection single tumor removal left inferior/adenoma with associated hyperparathyroidism     PROSTATECTOMY  2012    TONSILLECTOMY      TOTAL KNEE ARTHROPLASTY Left 2017    KNEE TOTAL ARTHROPLASTY performed by Hector Rowan MD at [address] [place] [address]   ,   Social History     Tobacco Use    Smoking status: Former Smoker     Packs/day: 1.00     Years: 20.00     Pack years: 20.00     Types: Cigarettes     Quit date: 1992     Years since quittin.8    Smokeless tobacco: Never Used   Vaping Use    Vaping Use: Never used   Substance Use Topics    Alcohol use:  Yes     Alcohol/week: 2.0 standard drinks     Types: 2 Glasses of wine per week     Comment: DAILY    Drug use: No   ,   Family History   Problem Relation Age of Onset    Cancer Mother 52        large cell nonkeratinizing squamous cell of the pancreas                          COPD Father     Cancer Father         Hodgkin's disease head, face, neck    Stroke Father         ischemic   ,   Immunization History   Administered Date(s) Administered    COVID-19, Milton Peacock, Primary or Immunocompromised, PF, 100mcg/0.5mL 03/11/2021, 04/02/2021    Influenza Vaccine, unspecified formulation 10/17/2018    Influenza Virus Vaccine 11/02/2012    Influenza, Daniele Reddish, IM, PF (6 mo and older Fluzone, Flulaval, Fluarix, and 3 yrs and older Afluria) 10/30/2017, 10/31/2019    Influenza, Quadv, adjuvanted, 65 yrs +, IM, PF (Fluad) 09/23/2020, 09/30/2021    Pneumococcal Conjugate 13-valent ([id number]) 05/01/2019    Pneumococcal Polysaccharide ([id number]) 09/23/2020    Tdap (Boostrix, Adacel) 11/12/2018   ,   Health Maintenance   Topic Date Due    Shingles Vaccine (1 of 2) Never done    COVID-19 Vaccine (3 - Booster) 09/02/2021    PSA counseling  10/05/2021    A1C test (Diabetic or Prediabetic)  06/07/2022    Depression Screen  09/23/2022    Annual Wellness Visit (AWV)  10/01/2022    Lipid screen  10/08/2022    Potassium monitoring  10/08/2022    Creatinine monitoring  10/08/2022    Colon cancer screen colonoscopy  04/15/2024    DTaP/Tdap/Td vaccine (2 - Td or Tdap) 11/12/2028    Flu vaccine  Completed    Pneumococcal 65+ years Vaccine  Completed    Hepatitis C screen  Completed    Hepatitis A vaccine  Aged Out    Hepatitis B vaccine  Aged Out    Hib vaccine  Aged Out    Meningococcal (ACWY) vaccine  Aged Out       PHYSICAL EXAMINATION:  [ INSTRUCTIONS:  \"[x]\" Indicates a positive item  \"[]\" Indicates a negative item  -- DELETE ALL ITEMS NOT EXAMINED]  Vital Signs: (As obtained by patient/caregiver or practitioner observation)    Blood pressure-  Heart rate-    Respiratory rate-    Temperature-  Pulse oximetry-     Constitutional: [x] Appears well-developed and well-nourished [] No apparent distress      [] Abnormal-   Mental status  [x] Alert and awake  [x] Oriented to person/place/time [x]Able to follow commands      Eyes:  EOM    [x]  Normal  [] Abnormal-  Sclera  [x]  Normal  [] Abnormal -         Discharge []  None visible  [] Abnormal -    HENT:   [x] Normocephalic, atraumatic. [] Abnormal   [x] Mouth/Throat: Mucous membranes are moist.     External Ears [x] Normal  [] Abnormal-     Neck: [x] No visualized mass     Pulmonary/Chest: [x] Respiratory effort normal.  [x] No visualized signs of difficulty breathing or respiratory distress        [] Abnormal-      Musculoskeletal:   [x] Normal gait with no signs of ataxia         [x] Normal range of motion of neck        [] Abnormal-       Neurological:        [x] No Facial Asymmetry (Cranial nerve 7 motor function) (limited exam to video visit)          [x] No gaze palsy        [] Abnormal-         Skin:        [x] No significant exanthematous lesions or discoloration noted on facial skin         [] Abnormal-            Psychiatric:       [x] Normal Affect [x] No Hallucinations        [] Abnormal-     Other pertinent observable physical exam findings-     ASSESSMENT/PLAN:    ICD-10-CM    1. Abdominal aortic aneurysm (AAA) without rupture (HCC)  I71.4    2. Peripheral thrombosis (HCC)  I74.4    3. Hyperparathyroidism, primary ([facility]  E21.0    4. Prostate cancer ([facility]  C61    5. Stable angina pectoris (HCC)  I20.8    6. Stage 3a chronic kidney disease (HCC)  N18.31    7. Essential hypertension  I10        Recommend contacting the new PCP for AAA f/u CT. Continue labetolol for hypertension and AAA tx. Chronic conditions controlled    No orders of the defined types were placed in this encounter. No orders of the defined types were placed in this encounter. Return if symptoms worsen or fail to improve. Irais Arevalo is a 74 y.o. male being evaluated by a Virtual Visit (video visit) encounter to address concerns as mentioned above. A caregiver was present when appropriate.  Due to this being a TeleHealth encounter (During IAGQP-52 public health emergency), evaluation of the following organ systems was limited: Vitals/Constitutional/EENT/Resp/CV/GI//MS/Neuro/Skin/Heme-Lymph-Imm. Pursuant to the emergency declaration under the [address] [place] [address] and the [employer] and [employer] Act, this Virtual Visit was conducted with patient's (and/or legal guardian's) consent, to reduce the patient's risk of exposure to COVID-19 and provide necessary medical care. The patient (and/or legal guardian) has also been advised to contact this office for worsening conditions or problems, and seek emergency medical treatment and/or call 911 if deemed necessary. Services were provided through a video synchronous discussion virtually to substitute for in-person clinic visit. Patient and provider were located at their individual homes or provider at secure site for business. It is possible that material may be posted from a notepad that is used for a Dragon dictation device for dictating notes outside the EMR and I am the original author of all of this content. --Veena Bueno MD on 2/15/2022 at 12:50 PM    An electronic signature was used to authenticate this note.

## 2022-03-22 ENCOUNTER — TELEPHONE (OUTPATIENT)
Dept: VASCULAR SURGERY | Age: 67
End: 2022-03-22

## 2022-03-22 NOTE — TELEPHONE ENCOUNTER
Spoke with Mr. Lamberto Kumari and informed him that he needed to find a primary care physician to refer him to a Vascular doctor in [facility]. I also gave him the number to medical records should he need it.

## 2022-03-23 DIAGNOSIS — Z76.0 MEDICATION REFILL: ICD-10-CM

## 2022-03-23 RX ORDER — ATORVASTATIN CALCIUM 20 MG/1
TABLET, FILM COATED ORAL
Qty: 90 TABLET | Refills: 2 | Status: SHIPPED | OUTPATIENT
Start: 2022-03-23 | End: 2022-10-25 | Stop reason: SDUPTHER

## 2022-03-23 NOTE — TELEPHONE ENCOUNTER
Patient needed refill on atorvastatin, medication was last sent by [facility] COLLAZO. Changed medication to be signed by primary care Dr. Mihir Garvey. Sent in 3 month supply to [address] [place] [address]  with 2 additional refills.

## 2022-04-04 DIAGNOSIS — Z76.0 MEDICATION REFILL: ICD-10-CM

## 2022-04-04 RX ORDER — FENOFIBRATE 160 MG/1
TABLET ORAL
Qty: 90 TABLET | Refills: 3 | Status: SHIPPED | OUTPATIENT
Start: 2022-04-04 | End: 2022-10-25 | Stop reason: SDUPTHER

## 2022-06-01 NOTE — PROGRESS NOTES
----- Message from Yaniv Murray MD sent at 6/1/2022  6:18 AM CDT -----  Regarding: RE:  Ok  ----- Message -----  From: Mikki Ramsay LPN  Sent: 5/31/2022   2:29 PM CDT  To: Yaniv Murray MD    Is it ok if we wait until Friday at [facility]? We have more staff then. Thursday at [facility] is only Valeria and I will be helping her the best I can.   ----- Message -----  From: Yaniv Murray MD  Sent: 5/31/2022  10:17 AM CDT  To: Terri Purvis Staff    Two days as a nurse visit for Salazar catheter voiding trial, 6 weeks with me with a uroflow and postvoid residual.       2020    TELEHEALTH EVALUATION -- Audio/Visual (During [facility]-07 public health emergency)    HPI:    Shravan Mccain (:  1955) has requested an audio/video evaluation for the following concern(s):    Patient with a history of hypertension and hyperlipidemia.     He is a patient of Dr. Jeffory Boast. Patient was last seen in the office on 10/22/2020. Patient was noted to have an elevated blood pressure. He was taking Avapro 150 mg half tablet in the morning and full tablet in the evening with no resolution of improvement in his blood pressures. We changed Avapro to 150 mg daily and added indapamide 1.25 mg daily. Blood pressures at home are running 268-097 systolic and diastolic 70 to 57B. Patient was seen in the ER on 11/10/2020 with elevated blood pressure of 190/100. He was given a prescription for clonidine 0.1 mg to take twice a day as needed. Patient has taken about 9 tablets. Patient denies any chest pain, pressure or tightness. There is no shortness of breath, orthopnea or PND. Patient denies any lightheadedness, dizziness or syncope.       [facility]:  [facility]  Summary impressions:    Normal study    Signed by Dr Oscar Conde on 2019 12:01 PM             2019 2D echo  Summary   Mitral valve leaflets are mildly thickened with preserved leaflet mobility. Trace mitral regurgitation.   Mildly thickened aortic valve leaflets with preserved leaflet mobility. Aortic valve appears to be tricuspid.   No evidence of aortic stenosis or aortic regurgitation.   Trivial tricuspid regurgitation with estimated RVSP of 31 mm Hg. Normal left ventricular size and function.   Mild concentric left ventricular hypertrophy.   Left ventricular ejection fraction is estimated at 53%.   E/A flow reversal noted.  Suggestive of diastolic dysfunction.      Signature      ----------------------------------------------------------------   Electronically signed by Keren Smiley ZAY Hankins   Zinc Sulfate (ZINC 15 PO) Take by mouth  Historical Provider, MD   Probiotic Product (PROBIOTIC ADVANCED PO) Take by mouth daily   Historical Provider, MD   Ascorbic Acid (VITAMIN C) 1000 MG tablet Take 1,000 mg by mouth daily  Historical Provider, MD   Multiple Vitamin (MULTIVITAMIN) capsule Take 1 capsule by mouth daily   Historical Provider, MD       Social History     Tobacco Use    Smoking status: Former Smoker     Packs/day: 1.00     Years: 20.00     Pack years: 20.00     Types: Cigarettes     Last attempt to quit: 1992     Years since quittin.6    Smokeless tobacco: Never Used   Substance Use Topics    Alcohol use: Yes     Alcohol/week: 2.0 standard drinks     Types: 2 Glasses of wine per week     Comment: DAILY    Drug use: No        No Known Allergies,   Past Medical History:   Diagnosis Date    Arthritis     OSTEO    CKD (chronic kidney disease) stage 3, GFR 30-59 ml/min 8/10/2017    Hyperglycemia 8/10/2017    Hyperlipidemia     Hypertension     Mixed hyperlipidemia 8/10/2017    Prostate carcinoma ([address] 8/10/2017    Sleep apnea, obstructive 2020   ,   Past Surgical History:   Procedure Laterality Date    KNEE SURGERY Left     knee ligamentous reconstruction with residual degenerative arthritis    PARATHYROIDECTOMY Left 2016    parathyroid resection single tumor removal left inferior/adenoma with associated hyperparathyroidism     PROSTATECTOMY  2012    TONSILLECTOMY      TOTAL KNEE ARTHROPLASTY Left 2017    KNEE TOTAL ARTHROPLASTY performed by Reva De La Garza MD at [address] [place] [address]   ,   Social History     Tobacco Use    Smoking status: Former Smoker     Packs/day: 1.00     Years: 20.00     Pack years: 20.00     Types: Cigarettes     Last attempt to quit: 1992     Years since quittin.6    Smokeless tobacco: Never Used   Substance Use Topics    Alcohol use:  Yes     Alcohol/week: 2.0 standard drinks     Types: 2 Glasses of wine per week Comment: DAILY    Drug use: No   ,   Family History   Problem Relation Age of Onset    Cancer Mother 52        large cell nonkeratinizing squamous cell of the pancreas                          COPD Father     Cancer Father         Hodgkin's disease head, face, neck    Stroke Father         ischemic   ,   Health Maintenance   Topic Date Due    Shingles Vaccine (1 of 2) 12/12/2003    A1C test (Diabetic or Prediabetic)  08/31/2020    Lipid screen  08/03/2021    Annual Wellness Visit (AWV)  09/24/2021    PSA counseling  10/05/2021    Potassium monitoring  11/10/2021    Creatinine monitoring  11/10/2021    Colon cancer screen colonoscopy  04/15/2024    DTaP/Tdap/Td vaccine (2 - Td) 11/12/2028    Flu vaccine  Completed    Pneumococcal 65+ years Vaccine  Completed    Hepatitis C screen  Completed    Hepatitis A vaccine  Aged Out    Hepatitis B vaccine  Aged Out    Hib vaccine  Aged Out    Meningococcal (ACWY) vaccine  Aged Out       PHYSICAL EXAMINATION:  [ INSTRUCTIONS:  \"[x]\" Indicates a positive item  \"[]\" Indicates a negative item  -- DELETE ALL ITEMS NOT EXAMINED]  Vital Signs: (As obtained by patient/caregiver or practitioner observation)    Blood pressure- 130/85 Heart rate-  71  Respiratory rate- 14   Temperature-  Pulse oximetry-     Constitutional: [x] Appears well-developed and well-nourished [x] No apparent distress      [] Abnormal-   Mental status  [x] Alert and awake  [x] Oriented to person/place/time [x]Able to follow commands      Eyes:  EOM    [x]  Normal  [x] Abnormal-  Sclera  []  Normal  [] Abnormal -         Discharge []  None visible  [] Abnormal -    HENT:   [x] Normocephalic, atraumatic.   [] Abnormal   [x] Mouth/Throat: Mucous membranes are moist.     External Ears [x] Normal  [] Abnormal-     Neck: [x] No visualized mass     Pulmonary/Chest: [x] Respiratory effort normal.  [x] No visualized signs of difficulty breathing or respiratory distress        [] Abnormal- Musculoskeletal:   [x] Normal gait with no signs of ataxia         [] Normal range of motion of neck        [] Abnormal-       Neurological:        [x] No Facial Asymmetry (Cranial nerve 7 motor function) (limited exam to video visit)          [] No gaze palsy        [] Abnormal-         Skin:        [x] No significant exanthematous lesions or discoloration noted on facial skin         [] Abnormal-            Psychiatric:       [x] Normal Affect [x] No Hallucinations        [] Abnormal-     Other pertinent observable physical exam findings-     ASSESSMENT/PLAN:  1. Hypertension -patient has had better blood pressure control since being in the ER. He is taking his Avapro 150 mg twice daily. He is only used 9 tablets of the as needed clonidine. Blood pressure since the ER visit has been 464 - 554 systolic. Did discuss with patient taking Avapro 150 mg twice daily. And taking the clonidine 0.1 mg nightly. Patient verbalized understanding agreed with plan. We will do another virtual visit in 4 weeks to address blood pressure control. No follow-ups on file. Kimberly Carbajal is a 75 y.o. male being evaluated by a Virtual Visit (video visit) encounter to address concerns as mentioned above. A caregiver was present when appropriate. Due to this being a TeleHealth encounter (During [facility] public health emergency), evaluation of the following organ systems was limited: Vitals/Constitutional/EENT/Resp/CV/GI//MS/Neuro/Skin/Heme-Lymph-Imm. Pursuant to the emergency declaration under the [facility], [address] [place] [address] and the [employer] Act, this Virtual Visit was conducted with patient's (and/or legal guardian's) consent, to reduce the patient's risk of exposure to COVID-19 and provide necessary medical care.   The patient (and/or legal guardian) has also been advised to contact this office for worsening conditions or problems, and seek emergency medical treatment and/or call 911 if deemed necessary. Patient identification was verified at the start of the visit: Yes    Services were provided through a video synchronous discussion virtually to substitute for in-person clinic visit. Patient and provider were located at their individual homes. --ZAY Stone NP on 11/20/2020 at 8:26 AM    An electronic signature was used to authenticate this note.

## 2022-06-16 DIAGNOSIS — I10 ESSENTIAL HYPERTENSION: ICD-10-CM

## 2022-06-18 RX ORDER — IRBESARTAN 150 MG/1
TABLET ORAL
Qty: 135 TABLET | Refills: 1 | Status: SHIPPED | OUTPATIENT
Start: 2022-06-18 | End: 2022-10-25 | Stop reason: SDUPTHER

## 2022-06-27 RX ORDER — LABETALOL 100 MG/1
TABLET, FILM COATED ORAL
COMMUNITY

## 2022-06-27 RX ORDER — FENOFIBRATE 160 MG/1
TABLET ORAL
COMMUNITY

## 2022-06-27 RX ORDER — ENZALUTAMIDE 80 MG/1
TABLET ORAL
COMMUNITY

## 2022-06-27 RX ORDER — IRBESARTAN 150 MG/1
TABLET ORAL
COMMUNITY

## 2022-06-27 RX ORDER — ATORVASTATIN CALCIUM 20 MG/1
TABLET, FILM COATED ORAL
COMMUNITY

## 2022-08-24 DIAGNOSIS — I10 ESSENTIAL HYPERTENSION: ICD-10-CM

## 2022-10-25 PROBLEM — R73.03 PREDIABETES: Status: ACTIVE | Noted: 2022-10-25

## 2022-12-05 ENCOUNTER — PREP FOR PROCEDURE (OUTPATIENT)
Dept: ADMINISTRATIVE | Age: 67
End: 2022-12-05

## 2022-12-14 RX ORDER — SODIUM CHLORIDE 0.9 % (FLUSH) 0.9 %
5-40 SYRINGE (ML) INJECTION PRN
Status: CANCELLED | OUTPATIENT
Start: 2022-12-14

## 2022-12-14 RX ORDER — SODIUM CHLORIDE 9 MG/ML
INJECTION, SOLUTION INTRAVENOUS PRN
Status: CANCELLED | OUTPATIENT
Start: 2022-12-14

## 2022-12-14 RX ORDER — SODIUM CHLORIDE 0.9 % (FLUSH) 0.9 %
5-40 SYRINGE (ML) INJECTION EVERY 12 HOURS SCHEDULED
Status: CANCELLED | OUTPATIENT
Start: 2022-12-14

## 2022-12-22 PROBLEM — I71.40 ABDOMINAL AORTIC ANEURYSM WITHOUT RUPTURE (HCC): Status: ACTIVE | Noted: 2022-12-22

## 2022-12-22 PROBLEM — I71.40 ABDOMINAL AORTIC ANEURYSM (AAA) 3.0 CM TO 5.5 CM IN DIAMETER IN MALE (HCC): Status: ACTIVE | Noted: 2022-12-22

## 2023-01-23 PROBLEM — E78.00 PURE HYPERCHOLESTEROLEMIA: Status: ACTIVE | Noted: 2023-01-23

## 2023-01-23 PROBLEM — I71.40 ABDOMINAL ANEURYSM: Status: ACTIVE | Noted: 2023-01-23

## 2023-10-23 PROBLEM — C79.51: Status: ACTIVE | Noted: 2023-08-08

## 2023-10-23 PROBLEM — N18.30 CKD (CHRONIC KIDNEY DISEASE) STAGE 3, GFR 30-59 ML/MIN (HCC): Chronic | Status: RESOLVED | Noted: 2017-08-10 | Resolved: 2023-10-23

## 2023-10-23 PROBLEM — C79.52: Status: ACTIVE | Noted: 2023-08-08

## 2024-04-01 ENCOUNTER — TELEPHONE (OUTPATIENT)
Dept: GASTROENTEROLOGY | Facility: CLINIC | Age: 69
End: 2024-04-01
Payer: MEDICARE

## 2024-04-01 NOTE — TELEPHONE ENCOUNTER
Caller: Ezio Boss    Relationship: Self    Best call back number: [phone]    What is the best time to reach you: ANYTIME     Who are you requesting to speak with (clinical staff, provider,  specific staff member): CLINICAL STAFF       What was the call regarding: PT IS CALLING BECAUSE HE RECEIVED MULTIPLE LETTERS TO SCHEDULE SCOPE. PT HAS MOVED TO [place] AND IS WANTING TO BE REMOVED FROM THE COMMUNICATION LIST HE HAS A NEW DOCTOR.

## (undated) DEVICE — ZIMMER® STERILE DISPOSABLE TOURNIQUET CUFF WITH PLC, DUAL PORT, SINGLE BLADDER, 34 IN. (86 CM)

## (undated) DEVICE — GOWN,PRECEPT,XLNG/XXLARGE,STRL: Brand: MEDLINE

## (undated) DEVICE — YANKAUER,BULB TIP WITH VENT: Brand: ARGYLE

## (undated) DEVICE — GLOVE SURG SZ 8.5 L11.6IN FNGR THK12.6MIL CUF THK8.3MIL BRN

## (undated) DEVICE — STERILE POLYISOPRENE POWDER-FREE SURGICAL GLOVES: Brand: PROTEXIS

## (undated) DEVICE — THE SINGLE USE ETRAP – POLYP TRAP IS USED FOR SUCTION RETRIEVAL OF ENDOSCOPICALLY REMOVED POLYPS.: Brand: ETRAP

## (undated) DEVICE — SURGICAL PROCEDURE PACK KNEE TOT DBD CDS LOURDES HOSP LF

## (undated) DEVICE — LARGE BONE, HALL BLADE, OSCILLATING, 19.5 X 105 X 1.27: Brand: HALL

## (undated) DEVICE — THREE QUARTER SHEET: Brand: CONVERTORS

## (undated) DEVICE — MASK,OXYGEN,MED CONC,ADLT,7' TUB, UC: Brand: PENDING

## (undated) DEVICE — ARM BOARD PAD: Brand: DEVON

## (undated) DEVICE — NON-WOVEN ADHESIVE WOUND DRESSING: Brand: PRIMAPORE ADHESIVE DRESSING 30*10CM

## (undated) DEVICE — LARGE BONE HALL BLADE, RECIPROCATOR, 12.5 X 76 X 1.27 MM: Brand: HALL

## (undated) DEVICE — Device

## (undated) DEVICE — Device: Brand: DEFENDO AIR/WATER/SUCTION AND BIOPSY VALVE

## (undated) DEVICE — SENSR O2 OXIMAX FNGR A/ 18IN NONSTR

## (undated) DEVICE — SUTURE VCRL SZ 1 L18IN ABSRB UD L36MM CT-1 1/2 CIR J841D

## (undated) DEVICE — MCLASS OSCILLATING SAW BLADE 19 X 1.27 (0.050") X 90 MM: Brand: MCLASS

## (undated) DEVICE — SYSTEM SKIN CLSR 22CM DERMBND PRINEO

## (undated) DEVICE — THE CHANNEL CLEANING BRUSH IS A NYLON FLEXI BRUSH ATTACHED TO A FLEXIBLE PLASTIC SHEATH DESIGNED TO SAFELY REMOVE DEBRIS FROM FLEXIBLE ENDOSCOPES.

## (undated) DEVICE — SUTURE VCRL SZ 2-0 L36IN ABSRB UD L36MM CT-1 1/2 CIR J945H

## (undated) DEVICE — GLOVE SURG SZ 75 L12IN FNGR THK87MIL DK GRN LTX FREE ISOLEX

## (undated) DEVICE — TBG SMPL FLTR LINE NASL 02/C02 A/ BX/100

## (undated) DEVICE — SOLUTION IV 250ML 0.9% SOD CHL PH 5 INJ USP VIAFLX PLAS

## (undated) DEVICE — BIPOLAR SEALER 23-112-1 AQM 6.0: Brand: AQUAMANTYS ®

## (undated) DEVICE — SOLUTION IV IRRIG POUR BRL 0.9% SODIUM CHL 2F7124

## (undated) DEVICE — Z INACTIVE USE 2660664 SOLUTION IRRIG 3000ML 0.9% SOD CHL USP UROMATIC PLAS CONT

## (undated) DEVICE — ENDOGATOR AUXILIARY WATER JET CONNECTOR: Brand: ENDOGATOR

## (undated) DEVICE — GLOVE SURG SZ 8 L12IN FNGR THK13MIL BRN LTX SYN POLYMER W

## (undated) DEVICE — 3M™ STERI-DRAPE™ INSTRUMENT POUCH 1018: Brand: STERI-DRAPE™

## (undated) DEVICE — CUFF,BP,DISP,1 TUBE,ADULT,HP: Brand: MEDLINE

## (undated) DEVICE — SNAR POLYP SENSATION MICRO OVL 13 240X40

## (undated) DEVICE — COVER LT HNDL CAM BLU DISP W/ SURG CTRL

## (undated) DEVICE — TRAY EPI 25GA L3.5IN 0.75% BIPIVCAIN 8.25% D CONTAIN BPA

## (undated) DEVICE — SUTURE VCRL SZ 2-0 L27IN ABSRB UD L26MM SH 1/2 CIR J417H

## (undated) DEVICE — Device: Brand: POWER-FLO®